# Patient Record
Sex: FEMALE | Race: BLACK OR AFRICAN AMERICAN | NOT HISPANIC OR LATINO | Employment: FULL TIME | URBAN - METROPOLITAN AREA
[De-identification: names, ages, dates, MRNs, and addresses within clinical notes are randomized per-mention and may not be internally consistent; named-entity substitution may affect disease eponyms.]

---

## 2017-03-02 ENCOUNTER — OFFICE VISIT (OUTPATIENT)
Dept: FAMILY MEDICINE | Facility: CLINIC | Age: 27
End: 2017-03-02
Payer: COMMERCIAL

## 2017-03-02 VITALS
RESPIRATION RATE: 18 BRPM | HEART RATE: 93 BPM | BODY MASS INDEX: 53.84 KG/M2 | WEIGHT: 274.25 LBS | SYSTOLIC BLOOD PRESSURE: 130 MMHG | DIASTOLIC BLOOD PRESSURE: 78 MMHG | OXYGEN SATURATION: 97 % | HEIGHT: 60 IN | TEMPERATURE: 98 F

## 2017-03-02 DIAGNOSIS — J06.9 ACUTE URI: Primary | ICD-10-CM

## 2017-03-02 PROCEDURE — 1160F RVW MEDS BY RX/DR IN RCRD: CPT | Mod: S$GLB,,, | Performed by: REGISTERED NURSE

## 2017-03-02 PROCEDURE — 99999 PR PBB SHADOW E&M-EST. PATIENT-LVL III: CPT | Mod: PBBFAC,,, | Performed by: REGISTERED NURSE

## 2017-03-02 PROCEDURE — 99213 OFFICE O/P EST LOW 20 MIN: CPT | Mod: S$GLB,,, | Performed by: REGISTERED NURSE

## 2017-03-02 RX ORDER — BENZONATATE 100 MG/1
100-200 CAPSULE ORAL 3 TIMES DAILY PRN
Qty: 45 CAPSULE | Refills: 0 | Status: SHIPPED | OUTPATIENT
Start: 2017-03-02 | End: 2017-03-31

## 2017-03-02 RX ORDER — METHYLPREDNISOLONE 4 MG/1
TABLET ORAL
Qty: 1 PACKAGE | Refills: 0 | Status: SHIPPED | OUTPATIENT
Start: 2017-03-02 | End: 2017-03-31

## 2017-03-02 NOTE — LETTER
March 2, 2017      [facility]  [address] [address]  Phone: [phone]       Patient: Rebecca Blankenship   YOB: 1990  Date of Visit: 03/02/2017    To Whom It May Concern:    Rebecca was at [facility] on 03/02/2017. She may return to work on 03/06/2017 with no restrictions. If you have any questions or concerns, or if I can be of further assistance, please do not hesitate to contact me.    Sincerely,    Roshan Bartlett. NP

## 2017-03-02 NOTE — MR AVS SNAPSHOT
[facility]  [address] [place] [address]  Phone: [phone]                  Rebecca Blankenship   3/2/2017 2:45 PM   Office Visit    Description:  Female : 1990   Provider:  Roshan Bartlett NP   Department:  [facility]           Reason for Visit     Sinus Problem           Diagnoses this Visit        Comments    Acute URI    -  Primary            To Do List           Future Appointments        Provider Department Dept Phone    3/20/2017 4:00 PM Gaby Bishop MD [facility] [phone]      Goals (5 Years of Data)     None       These Medications        Disp Refills Start End    methylPREDNISolone (MEDROL DOSEPACK) 4 mg tablet 1 Package 0 3/2/2017     use as directed    Pharmacy: [facility] [address] [place] [address] Ph #: [phone]       benzonatate (TESSALON) 100 MG capsule 45 capsule 0 3/2/2017     Take 1-2 capsules (100-200 mg total) by mouth 3 (three) times daily as needed for Cough. - Oral    Pharmacy: [facility] [address] [place] [address] Ph #: [phone]         [facility]     [facility] On Call Nurse Care Line -  Assistance  Registered nurses in the [facility] On Call Center provide clinical advisement, health education, appointment booking, and other advisory services.  Call for this free service at [phone].             Medications           Message regarding Medications     Verify the changes and/or additions to your medication regime listed below are the same as discussed with your clinician today.  If any of these changes or additions are incorrect, please notify your healthcare provider.        START taking these NEW medications        Refills    methylPREDNISolone (MEDROL DOSEPACK) 4 mg tablet 0    Sig: use as directed    Class: Normal    benzonatate (TESSALON) 100 MG capsule 0    Sig: Take 1-2 capsules (100-200 mg total) by  mouth 3 (three) times daily as needed for Cough.    Class: Normal    Route: Oral           Verify that the below list of medications is an accurate representation of the medications you are currently taking.  If none reported, the list may be blank. If incorrect, please contact your healthcare provider. Carry this list with you in case of emergency.           Current Medications     ibuprofen (ADVIL,MOTRIN) 800 MG tablet Take 1 tablet (800 mg total) by mouth 3 (three) times daily as needed for Pain (with food).    metformin (GLUCOPHAGE-XR) 500 MG 24 hr tablet Take 2 tablets (1,000 mg total) by mouth 2 (two) times daily with meals.    methocarbamol (ROBAXIN) 750 MG Tab Take 1 tablet (750 mg total) by mouth 2 (two) times daily as needed.    spironolactone (ALDACTONE) 100 MG tablet Take 1 tablet (100 mg total) by mouth once daily.    benzonatate (TESSALON) 100 MG capsule Take 1-2 capsules (100-200 mg total) by mouth 3 (three) times daily as needed for Cough.    cimetidine (TAGAMET) 800 MG tablet Take 1 tablet (800 mg total) by mouth every evening.    methylPREDNISolone (MEDROL DOSEPACK) 4 mg tablet use as directed    rabeprazole (ACIPHEX) 20 mg tablet Take 1 tablet (20 mg total) by mouth once daily.           Clinical Reference Information           Your Vitals Were     BP                   130/78 (BP Location: Left arm, Patient Position: Sitting, BP Method: Manual)           Blood Pressure          Most Recent Value    BP  130/78      Allergies as of 3/2/2017     No Known Allergies      Immunizations Administered on Date of Encounter - 3/2/2017     None      Language Assistance Services     ATTENTION: Language assistance services are available, free of charge. Please call [phone].      ATENCIÓN: Si bryant barbour, tiene a lopez disposición servicios gratuitos de asistencia lingüística. Llame al [phone].     CHÚ Ý: N?u b?n nói Ti?ng Vi?t, có các d?ch v? h? tr? ngôn ng? mi?n phí dành cho b?n. G?i s?  [phone].         [facility] complies with applicable Federal civil rights laws and does not discriminate on the basis of race, color, national origin, age, disability, or sex.

## 2017-03-06 NOTE — PROGRESS NOTES
"Subjective:       Patient ID: Rebecca Blankenship is a 26 y.o. female.    Chief Complaint: Sinus Problem    HPI     Rebecca is here today with c/o sinus issues x few days.  Reports sore throat, cough, RN, NC and chills.  She has taken OTC meds which does help control symptoms some.    Review of Systems   Constitutional: Positive for chills. Negative for diaphoresis.   HENT: Positive for congestion, rhinorrhea and sore throat. Negative for ear pain, sinus pressure and sneezing.    Eyes: Negative.    Respiratory: Positive for cough. Negative for shortness of breath and wheezing.    Cardiovascular: Negative.    Neurological: Negative.    Hematological: Negative for adenopathy.       Objective:         Vitals:    03/02/17 1503   BP: 130/78   BP Location: Left arm   Patient Position: Sitting   BP Method: Manual   Pulse: 93   Resp: 18   Temp: 98.1 °F (36.7 °C)   TempSrc: Tympanic   SpO2: 97%   Weight: 124.4 kg (274 lb 4 oz)   Height: 4' 11.5" (1.511 m)       Physical Exam   Constitutional: She is oriented to person, place, and time. She appears well-developed and well-nourished.   HENT:   Head: Normocephalic and atraumatic.   Right Ear: Tympanic membrane, external ear and ear canal normal.   Left Ear: Tympanic membrane, external ear and ear canal normal.   Nose: Mucosal edema present. No rhinorrhea. Right sinus exhibits no maxillary sinus tenderness and no frontal sinus tenderness. Left sinus exhibits no maxillary sinus tenderness and no frontal sinus tenderness.   Mouth/Throat: Oropharynx is clear and moist.   Eyes: Pupils are equal, round, and reactive to light. Right eye exhibits no discharge. Left eye exhibits no discharge. No scleral icterus.   Cardiovascular: Normal rate, regular rhythm and normal heart sounds.    Pulmonary/Chest: Effort normal and breath sounds normal.   Lymphadenopathy:     She has no cervical adenopathy.   Neurological: She is alert and oriented to person, place, and time.       Assessment: "       1. Acute URI        Plan:       Rebecca was seen today for sinus problem.    Diagnoses and all orders for this visit:    Acute URI  -     methylPREDNISolone (MEDROL DOSEPACK) 4 mg tablet; use as directed  -     benzonatate (TESSALON) 100 MG capsule; Take 1-2 capsules (100-200 mg total) by mouth 3 (three) times daily as needed for Cough.      Symptomatic care, rest, fluids, hydration.  Follow-up in clinic as needed.

## 2017-03-31 ENCOUNTER — OFFICE VISIT (OUTPATIENT)
Dept: FAMILY MEDICINE | Facility: CLINIC | Age: 27
End: 2017-03-31
Payer: COMMERCIAL

## 2017-03-31 VITALS
RESPIRATION RATE: 18 BRPM | SYSTOLIC BLOOD PRESSURE: 130 MMHG | HEART RATE: 88 BPM | BODY MASS INDEX: 53.24 KG/M2 | TEMPERATURE: 98 F | WEIGHT: 271.19 LBS | DIASTOLIC BLOOD PRESSURE: 82 MMHG | HEIGHT: 60 IN

## 2017-03-31 DIAGNOSIS — D50.9 IRON DEFICIENCY ANEMIA, UNSPECIFIED IRON DEFICIENCY ANEMIA TYPE: ICD-10-CM

## 2017-03-31 DIAGNOSIS — Z00.00 PREVENTATIVE HEALTH CARE: Primary | ICD-10-CM

## 2017-03-31 DIAGNOSIS — L68.0 ANDROGEN-DEPENDENT HIRSUTISM: ICD-10-CM

## 2017-03-31 DIAGNOSIS — E88.810 INSULIN RESISTANCE SYNDROME: ICD-10-CM

## 2017-03-31 DIAGNOSIS — K21.9 GASTROESOPHAGEAL REFLUX DISEASE WITHOUT ESOPHAGITIS: ICD-10-CM

## 2017-03-31 DIAGNOSIS — E66.01 MORBID OBESITY WITH BMI OF 50.0-59.9, ADULT: ICD-10-CM

## 2017-03-31 PROCEDURE — 99395 PREV VISIT EST AGE 18-39: CPT | Mod: S$GLB,,, | Performed by: FAMILY MEDICINE

## 2017-03-31 PROCEDURE — 99999 PR PBB SHADOW E&M-EST. PATIENT-LVL III: CPT | Mod: PBBFAC,,, | Performed by: FAMILY MEDICINE

## 2017-03-31 RX ORDER — SPIRONOLACTONE 100 MG/1
100 TABLET, FILM COATED ORAL 2 TIMES DAILY
Qty: 60 TABLET | Refills: 11 | Status: SHIPPED | OUTPATIENT
Start: 2017-03-31 | End: 2018-07-17 | Stop reason: SDUPTHER

## 2017-03-31 NOTE — MR AVS SNAPSHOT
[facility]  [address]  [place] [address]  Phone: [phone]                  Rebecca Blankenship   3/31/2017 4:00 PM   Office Visit    Description:  Female : 1990   Provider:  Gaby Bishop MD   Department:  [facility]           Reason for Visit     Annual Exam           Diagnoses this Visit        Comments    Preventative health care    -  Primary     Gastroesophageal reflux disease without esophagitis         Androgen-dependent hirsutism         Insulin resistance syndrome         Iron deficiency anemia, unspecified iron deficiency anemia type         Morbid obesity with BMI of 50.0-59.9, adult                To Do List           Future Appointments        Provider Department Dept Phone    4/3/2017 8:40 AM LABORATORY, [address] [facility] [phone]    2017 4:45 PM Samaria Dye MD [facility]/ -[phone]      Goals (5 Years of Data)     None       These Medications        Disp Refills Start End    spironolactone (ALDACTONE) 100 MG tablet 60 tablet 11 3/31/2017 3/31/2018    Take 1 tablet (100 mg total) by mouth 2 (two) times daily. - Oral    Pharmacy: [facility] [address] [place] [address] Ph #: [phone]         [facility]     [facility] Nurse Care Line -  Assistance  Unless otherwise directed by your provider, please contact [facility], our nurse care line that is available for  assistance.     Registered nurses in the [facility] Center provide: appointment scheduling, clinical advisement, health education, and other advisory services.  Call: [phone] (toll free)               Medications           Message regarding Medications     Verify the changes and/or additions to your medication regime listed below are the same as discussed with your clinician today.  If any of these changes or additions are incorrect, please notify  "your healthcare provider.        CHANGE how you are taking these medications     Start Taking Instead of    spironolactone (ALDACTONE) 100 MG tablet spironolactone (ALDACTONE) 100 MG tablet    Dosage:  Take 1 tablet (100 mg total) by mouth 2 (two) times daily. Dosage:  Take 1 tablet (100 mg total) by mouth once daily.    Reason for Change:  Reorder       STOP taking these medications     methylPREDNISolone (MEDROL DOSEPACK) 4 mg tablet use as directed    benzonatate (TESSALON) 100 MG capsule Take 1-2 capsules (100-200 mg total) by mouth 3 (three) times daily as needed for Cough.           Verify that the below list of medications is an accurate representation of the medications you are currently taking.  If none reported, the list may be blank. If incorrect, please contact your healthcare provider. Carry this list with you in case of emergency.           Current Medications     ibuprofen (ADVIL,MOTRIN) 800 MG tablet Take 1 tablet (800 mg total) by mouth 3 (three) times daily as needed for Pain (with food).    metformin (GLUCOPHAGE-XR) 500 MG 24 hr tablet Take 2 tablets (1,000 mg total) by mouth 2 (two) times daily with meals.    methocarbamol (ROBAXIN) 750 MG Tab Take 1 tablet (750 mg total) by mouth 2 (two) times daily as needed.    spironolactone (ALDACTONE) 100 MG tablet Take 1 tablet (100 mg total) by mouth 2 (two) times daily.    cimetidine (TAGAMET) 800 MG tablet Take 1 tablet (800 mg total) by mouth every evening.    rabeprazole (ACIPHEX) 20 mg tablet Take 1 tablet (20 mg total) by mouth once daily.           Clinical Reference Information           Your Vitals Were     BP Pulse Temp Resp Height Weight    130/82 88 98.1 °F (36.7 °C) (Tympanic) 18 4' 11.5" (1.511 m) 123 kg (271 lb 2.7 oz)    Last Period BMI             01/13/2017 53.85 kg/m2         Blood Pressure          Most Recent Value    BP  130/82      Allergies as of 3/31/2017     No Known Allergies      Immunizations Administered on Date of Encounter " - 3/31/2017     None      Orders Placed During Today's Visit     Future Labs/Procedures Expected by Expires    Basic metabolic panel  3/31/2017 5/30/2018    CBC auto differential  3/31/2017 5/30/2018    Hemoglobin A1c  3/31/2017 5/30/2018    Insulin, random  3/31/2017 5/30/2018      Language Assistance Services     ATTENTION: Language assistance services are available, free of charge. Please call [phone].      ATENCIÓN: Si habla español, tiene a lopez disposición servicios gratuitos de asistencia lingüística. Llame al [phone].     CHÚ Ý: N?u b?n nói Ti?ng Vi?t, có các d?ch v? h? tr? ngôn ng? mi?n phí dành cho b?n. G?i s? [phone].         [facility] complies with applicable Federal civil rights laws and does not discriminate on the basis of race, color, national origin, age, disability, or sex.

## 2017-04-04 ENCOUNTER — LAB VISIT (OUTPATIENT)
Dept: LAB | Facility: HOSPITAL | Age: 27
End: 2017-04-04
Attending: FAMILY MEDICINE
Payer: COMMERCIAL

## 2017-04-04 DIAGNOSIS — Z00.00 PREVENTATIVE HEALTH CARE: ICD-10-CM

## 2017-04-04 DIAGNOSIS — E88.810 INSULIN RESISTANCE SYNDROME: ICD-10-CM

## 2017-04-04 LAB
ANION GAP SERPL CALC-SCNC: 8 MMOL/L
BASOPHILS # BLD AUTO: 0.01 K/UL
BASOPHILS NFR BLD: 0.1 %
BUN SERPL-MCNC: 11 MG/DL
CALCIUM SERPL-MCNC: 9.4 MG/DL
CHLORIDE SERPL-SCNC: 105 MMOL/L
CO2 SERPL-SCNC: 26 MMOL/L
CREAT SERPL-MCNC: 0.7 MG/DL
DIFFERENTIAL METHOD: ABNORMAL
EOSINOPHIL # BLD AUTO: 0.1 K/UL
EOSINOPHIL NFR BLD: 0.7 %
ERYTHROCYTE [DISTWIDTH] IN BLOOD BY AUTOMATED COUNT: 21.2 %
EST. GFR  (AFRICAN AMERICAN): >60 ML/MIN/1.73 M^2
EST. GFR  (NON AFRICAN AMERICAN): >60 ML/MIN/1.73 M^2
GLUCOSE SERPL-MCNC: 76 MG/DL
HCT VFR BLD AUTO: 35.6 %
HGB BLD-MCNC: 11.6 G/DL
INSULIN COLLECTION INTERVAL: NORMAL
INSULIN SERPL-ACNC: 23.7 UU/ML
LYMPHOCYTES # BLD AUTO: 2.7 K/UL
LYMPHOCYTES NFR BLD: 39.8 %
MCH RBC QN AUTO: 24.6 PG
MCHC RBC AUTO-ENTMCNC: 32.6 %
MCV RBC AUTO: 76 FL
MONOCYTES # BLD AUTO: 0.5 K/UL
MONOCYTES NFR BLD: 7 %
NEUTROPHILS # BLD AUTO: 3.6 K/UL
NEUTROPHILS NFR BLD: 52.1 %
PLATELET # BLD AUTO: 395 K/UL
PMV BLD AUTO: 9.6 FL
POTASSIUM SERPL-SCNC: 4.4 MMOL/L
RBC # BLD AUTO: 4.71 M/UL
SODIUM SERPL-SCNC: 139 MMOL/L
WBC # BLD AUTO: 6.89 K/UL

## 2017-04-04 PROCEDURE — 83525 ASSAY OF INSULIN: CPT

## 2017-04-04 PROCEDURE — 85025 COMPLETE CBC W/AUTO DIFF WBC: CPT

## 2017-04-04 PROCEDURE — 36415 COLL VENOUS BLD VENIPUNCTURE: CPT | Mod: PO

## 2017-04-04 PROCEDURE — 83036 HEMOGLOBIN GLYCOSYLATED A1C: CPT

## 2017-04-04 PROCEDURE — 80048 BASIC METABOLIC PNL TOTAL CA: CPT

## 2017-04-05 LAB
ESTIMATED AVG GLUCOSE: 123 MG/DL
HBA1C MFR BLD HPLC: 5.9 %

## 2017-04-24 ENCOUNTER — PATIENT MESSAGE (OUTPATIENT)
Dept: FAMILY MEDICINE | Facility: CLINIC | Age: 27
End: 2017-04-24

## 2017-04-26 RX ORDER — RABEPRAZOLE SODIUM 20 MG/1
20 TABLET, DELAYED RELEASE ORAL DAILY
Qty: 30 TABLET | Refills: 0 | Status: SHIPPED | OUTPATIENT
Start: 2017-04-26 | End: 2017-12-22

## 2017-06-28 RX ORDER — RABEPRAZOLE SODIUM 20 MG/1
TABLET, DELAYED RELEASE ORAL
Qty: 30 TABLET | Refills: 0 | Status: SHIPPED | OUTPATIENT
Start: 2017-06-28 | End: 2017-09-05 | Stop reason: SDUPTHER

## 2017-09-05 RX ORDER — METHOCARBAMOL 750 MG/1
750 TABLET, FILM COATED ORAL 2 TIMES DAILY PRN
Qty: 60 TABLET | Refills: 5 | Status: SHIPPED | OUTPATIENT
Start: 2017-09-05 | End: 2020-04-21 | Stop reason: SDUPTHER

## 2017-09-05 RX ORDER — RABEPRAZOLE SODIUM 20 MG/1
20 TABLET, DELAYED RELEASE ORAL DAILY
Qty: 30 TABLET | Refills: 5 | Status: SHIPPED | OUTPATIENT
Start: 2017-09-05 | End: 2017-12-22 | Stop reason: SDUPTHER

## 2017-09-05 RX ORDER — RABEPRAZOLE SODIUM 20 MG/1
TABLET, DELAYED RELEASE ORAL
Qty: 30 TABLET | Refills: 0 | OUTPATIENT
Start: 2017-09-05

## 2017-12-22 ENCOUNTER — OFFICE VISIT (OUTPATIENT)
Dept: FAMILY MEDICINE | Facility: CLINIC | Age: 27
End: 2017-12-22
Payer: COMMERCIAL

## 2017-12-22 VITALS
WEIGHT: 280.88 LBS | BODY MASS INDEX: 55.14 KG/M2 | TEMPERATURE: 98 F | HEART RATE: 88 BPM | HEIGHT: 60 IN | OXYGEN SATURATION: 99 % | SYSTOLIC BLOOD PRESSURE: 130 MMHG | DIASTOLIC BLOOD PRESSURE: 80 MMHG | RESPIRATION RATE: 17 BRPM

## 2017-12-22 DIAGNOSIS — B34.9 ACUTE VIRAL SYNDROME: Primary | ICD-10-CM

## 2017-12-22 PROCEDURE — 99213 OFFICE O/P EST LOW 20 MIN: CPT | Mod: S$GLB,,, | Performed by: REGISTERED NURSE

## 2017-12-22 PROCEDURE — 99999 PR PBB SHADOW E&M-EST. PATIENT-LVL III: CPT | Mod: PBBFAC,,, | Performed by: REGISTERED NURSE

## 2017-12-22 RX ORDER — PREDNISONE 20 MG/1
TABLET ORAL
Qty: 10 TABLET | Refills: 0 | Status: SHIPPED | OUTPATIENT
Start: 2017-12-22 | End: 2018-07-16

## 2017-12-22 RX ORDER — PROMETHAZINE HYDROCHLORIDE AND DEXTROMETHORPHAN HYDROBROMIDE 6.25; 15 MG/5ML; MG/5ML
5 SYRUP ORAL
Qty: 180 ML | Refills: 0 | Status: SHIPPED | OUTPATIENT
Start: 2017-12-22 | End: 2018-10-16

## 2018-01-09 DIAGNOSIS — B34.9 ACUTE VIRAL SYNDROME: ICD-10-CM

## 2018-01-09 RX ORDER — PREDNISONE 20 MG/1
TABLET ORAL
Qty: 10 TABLET | Refills: 0 | OUTPATIENT
Start: 2018-01-09

## 2018-07-16 ENCOUNTER — PATIENT MESSAGE (OUTPATIENT)
Dept: OBSTETRICS AND GYNECOLOGY | Facility: CLINIC | Age: 28
End: 2018-07-16

## 2018-07-16 ENCOUNTER — PATIENT MESSAGE (OUTPATIENT)
Dept: FAMILY MEDICINE | Facility: CLINIC | Age: 28
End: 2018-07-16

## 2018-07-16 ENCOUNTER — LAB VISIT (OUTPATIENT)
Dept: LAB | Facility: HOSPITAL | Age: 28
End: 2018-07-16
Attending: REGISTERED NURSE
Payer: COMMERCIAL

## 2018-07-16 ENCOUNTER — OFFICE VISIT (OUTPATIENT)
Dept: FAMILY MEDICINE | Facility: CLINIC | Age: 28
End: 2018-07-16
Payer: COMMERCIAL

## 2018-07-16 ENCOUNTER — OFFICE VISIT (OUTPATIENT)
Dept: OBSTETRICS AND GYNECOLOGY | Facility: CLINIC | Age: 28
End: 2018-07-16
Payer: COMMERCIAL

## 2018-07-16 VITALS
HEIGHT: 59 IN | WEIGHT: 279.88 LBS | DIASTOLIC BLOOD PRESSURE: 84 MMHG | SYSTOLIC BLOOD PRESSURE: 130 MMHG | BODY MASS INDEX: 56.42 KG/M2

## 2018-07-16 VITALS
DIASTOLIC BLOOD PRESSURE: 78 MMHG | OXYGEN SATURATION: 99 % | SYSTOLIC BLOOD PRESSURE: 124 MMHG | TEMPERATURE: 98 F | HEIGHT: 59 IN | WEIGHT: 278.88 LBS | HEART RATE: 87 BPM | BODY MASS INDEX: 56.22 KG/M2

## 2018-07-16 DIAGNOSIS — E88.810 INSULIN RESISTANCE SYNDROME: ICD-10-CM

## 2018-07-16 DIAGNOSIS — L68.0 ANDROGEN-DEPENDENT HIRSUTISM: ICD-10-CM

## 2018-07-16 DIAGNOSIS — Z00.00 ANNUAL PHYSICAL EXAM: ICD-10-CM

## 2018-07-16 DIAGNOSIS — K21.9 GASTROESOPHAGEAL REFLUX DISEASE WITHOUT ESOPHAGITIS: ICD-10-CM

## 2018-07-16 DIAGNOSIS — E66.01 MORBID OBESITY WITH BMI OF 50.0-59.9, ADULT: ICD-10-CM

## 2018-07-16 DIAGNOSIS — Z00.00 ANNUAL PHYSICAL EXAM: Primary | ICD-10-CM

## 2018-07-16 DIAGNOSIS — D50.9 IRON DEFICIENCY ANEMIA, UNSPECIFIED IRON DEFICIENCY ANEMIA TYPE: ICD-10-CM

## 2018-07-16 DIAGNOSIS — N92.1 MENORRHAGIA WITH IRREGULAR CYCLE: Primary | ICD-10-CM

## 2018-07-16 LAB
ALBUMIN SERPL BCP-MCNC: 3.6 G/DL
ALP SERPL-CCNC: 116 U/L
ALT SERPL W/O P-5'-P-CCNC: 34 U/L
ANION GAP SERPL CALC-SCNC: 9 MMOL/L
AST SERPL-CCNC: 34 U/L
BASOPHILS # BLD AUTO: 0.02 K/UL
BASOPHILS NFR BLD: 0.3 %
BILIRUB SERPL-MCNC: 0.9 MG/DL
BUN SERPL-MCNC: 10 MG/DL
CALCIUM SERPL-MCNC: 9.3 MG/DL
CHLORIDE SERPL-SCNC: 106 MMOL/L
CO2 SERPL-SCNC: 24 MMOL/L
CREAT SERPL-MCNC: 0.7 MG/DL
DIFFERENTIAL METHOD: ABNORMAL
EOSINOPHIL # BLD AUTO: 0.1 K/UL
EOSINOPHIL NFR BLD: 0.6 %
ERYTHROCYTE [DISTWIDTH] IN BLOOD BY AUTOMATED COUNT: 19.5 %
EST. GFR  (AFRICAN AMERICAN): >60 ML/MIN/1.73 M^2
EST. GFR  (NON AFRICAN AMERICAN): >60 ML/MIN/1.73 M^2
GLUCOSE SERPL-MCNC: 86 MG/DL
HCT VFR BLD AUTO: 33 %
HGB BLD-MCNC: 9.4 G/DL
IMM GRANULOCYTES # BLD AUTO: 0.04 K/UL
IMM GRANULOCYTES NFR BLD AUTO: 0.5 %
LYMPHOCYTES # BLD AUTO: 2.2 K/UL
LYMPHOCYTES NFR BLD: 28.8 %
MCH RBC QN AUTO: 22 PG
MCHC RBC AUTO-ENTMCNC: 28.5 G/DL
MCV RBC AUTO: 77 FL
MONOCYTES # BLD AUTO: 0.6 K/UL
MONOCYTES NFR BLD: 7.3 %
NEUTROPHILS # BLD AUTO: 4.8 K/UL
NEUTROPHILS NFR BLD: 62.5 %
NRBC BLD-RTO: 0 /100 WBC
PLATELET # BLD AUTO: 405 K/UL
PMV BLD AUTO: 9.2 FL
POTASSIUM SERPL-SCNC: 4.2 MMOL/L
PROT SERPL-MCNC: 7.7 G/DL
RBC # BLD AUTO: 4.27 M/UL
SODIUM SERPL-SCNC: 139 MMOL/L
TESTOST SERPL-MCNC: 43 NG/DL
TSH SERPL DL<=0.005 MIU/L-ACNC: 2.73 UIU/ML
WBC # BLD AUTO: 7.71 K/UL

## 2018-07-16 PROCEDURE — 36415 COLL VENOUS BLD VENIPUNCTURE: CPT | Mod: PO

## 2018-07-16 PROCEDURE — 81025 URINE PREGNANCY TEST: CPT | Mod: S$GLB,,, | Performed by: NURSE PRACTITIONER

## 2018-07-16 PROCEDURE — 80053 COMPREHEN METABOLIC PANEL: CPT

## 2018-07-16 PROCEDURE — 85025 COMPLETE CBC W/AUTO DIFF WBC: CPT

## 2018-07-16 PROCEDURE — 83036 HEMOGLOBIN GLYCOSYLATED A1C: CPT

## 2018-07-16 PROCEDURE — 84443 ASSAY THYROID STIM HORMONE: CPT

## 2018-07-16 PROCEDURE — 83525 ASSAY OF INSULIN: CPT

## 2018-07-16 PROCEDURE — 3008F BODY MASS INDEX DOCD: CPT | Mod: CPTII,S$GLB,, | Performed by: NURSE PRACTITIONER

## 2018-07-16 PROCEDURE — 84403 ASSAY OF TOTAL TESTOSTERONE: CPT

## 2018-07-16 PROCEDURE — 99999 PR PBB SHADOW E&M-EST. PATIENT-LVL III: CPT | Mod: PBBFAC,,, | Performed by: NURSE PRACTITIONER

## 2018-07-16 PROCEDURE — 99999 PR PBB SHADOW E&M-EST. PATIENT-LVL III: CPT | Mod: PBBFAC,,, | Performed by: REGISTERED NURSE

## 2018-07-16 PROCEDURE — 99395 PREV VISIT EST AGE 18-39: CPT | Mod: S$GLB,,, | Performed by: REGISTERED NURSE

## 2018-07-16 PROCEDURE — 99204 OFFICE O/P NEW MOD 45 MIN: CPT | Mod: 25,S$GLB,, | Performed by: NURSE PRACTITIONER

## 2018-07-16 RX ORDER — SPIRONOLACTONE 100 MG/1
100 TABLET, FILM COATED ORAL 2 TIMES DAILY
Qty: 60 TABLET | Refills: 11 | Status: CANCELLED | OUTPATIENT
Start: 2018-07-16 | End: 2019-07-16

## 2018-07-16 RX ORDER — RABEPRAZOLE SODIUM 20 MG/1
20 TABLET, DELAYED RELEASE ORAL DAILY
Qty: 30 TABLET | Refills: 5 | Status: CANCELLED | OUTPATIENT
Start: 2018-07-16

## 2018-07-16 RX ORDER — LEVONORGESTREL AND ETHINYL ESTRADIOL 0.15-0.03
1 KIT ORAL DAILY
Qty: 30 TABLET | Refills: 11 | Status: SHIPPED | OUTPATIENT
Start: 2018-07-16 | End: 2019-05-30 | Stop reason: SDUPTHER

## 2018-07-16 RX ORDER — METFORMIN HYDROCHLORIDE 500 MG/1
1000 TABLET, EXTENDED RELEASE ORAL 2 TIMES DAILY WITH MEALS
Qty: 120 TABLET | Refills: 6 | Status: CANCELLED | OUTPATIENT
Start: 2018-07-16 | End: 2019-07-16

## 2018-07-16 NOTE — LETTER
July 16, 2018      [facility]  [address] [facility] [address]  Phone: [phone]       Patient: Rebecca Blankenship   YOB: 1990  Date of Visit: 07/16/2018    To Whom It May Concern:    Sherrie Blankenship  was at [facility] on 07/16/2018. She may return to work on 07/17/2018 with no restrictions. If you have any questions or concerns, or if I can be of further assistance, please do not hesitate to contact me.    Sincerely,    Nia Mora MA

## 2018-07-16 NOTE — PATIENT INSTRUCTIONS
Heavy Menstrual Bleeding    Heavy menstrual bleeding means that your periods are heavier or longer than usual. You may soak through a pad or tampon every 1 to 3 hours on the heaviest days of your period. You may also pass large, dark clots. And your periods may last longer than 7 days.  If you have heavy periods often, this can cause a problem called anemia. With anemia, your red blood cell count is too low. Red blood cells are needed because they help carry oxygen throughout your body. Severe anemia may cause you to look pale and feel weak or tired. You might also become short of breath easily.  There are many possible causes of heavy menstrual bleeding. Hormonal imbalance is the most common cause. Having benign growths in your uterus, such as fibroids or polyps, is another cause. Taking certain medicines or having certain health problems or bleeding disorders are also causes.  To treat heavy menstrual bleeding, medicines are often tried first. If these dont help, further testing and treatments will likely be needed.  Home care  Medicines  If youre prescribed medicines, be sure to take them as directed.  · To help control heavy bleeding, any of the following may be used:  ¨ Hormone therapy (this includes all methods of hormonal birth control such as pills, shots, cream, ring, patch, or hormone-releasing IUD)  ¨ Nonsteroidal anti-inflammatory drugs (NSAIDs), such as ibuprofen  ¨ Antifibrinolytic medicines, such as transexamic acid  · To help treat anemia, iron supplements may be prescribed.            General care  · Get plenty of rest if you tire easily. Avoid heavy exertion.  · To help relieve pain or cramping, try using a heating pad on the lower belly or back. A warm bath may also help.  Follow-up care  Follow up with your healthcare provider as directed.  When to seek medical advice  Call your healthcare provider right away if any of these occur:  · Heavier bleeding (soaking 1 pad or tampon every hour for 3  hours)  · Heavy bleeding that lasts longer than 1 week  · Fever of 100.4ºF (38ºC) or higher, or as directed by your provider  · Pain or cramping that gets worse instead of better  · Signs of anemia such as pale skin, extreme fatigue or weakness, or shortness of breath  · Dizziness or fainting  Date Last Reviewed: 6/11/2015  © 8436-8942 [employer]. [address] [place] [address], [place] [address]. All rights reserved. This information is not intended as a substitute for professional medical care. Always follow your healthcare professional's instructions.        Dysfunctional Uterine Bleeding    Dysfunctional uterine bleeding is a condition in which bleeding is abnormal and occurs at unexpected times of the month. This happens because of changes in the hormones that help control a womans menstrual cycle each month.  The bleeding may be heavier or lighter than normal. If you have heavy bleeding often, this can lead to a problem called anemia. With anemia, your red blood cell count is too low. Red blood cells are needed because they help carry oxygen throughout your body. Severe anemia may cause you to look pale and feel very weak or tired. You might also become short of breath easily.  To treat dysfunctional uterine bleeding, medicines are often tried first. If these dont help, further testing and treatments may be needed. Discuss all of your options with your provider.  Home care  Medicines  If youre prescribed medicines, be sure to take them as directed. Some of the more common medicines you may be prescribed include:  · Hormone therapy (Options include most methods of hormonal birth control such as pills, shots, or a hormone-releasing IUD)  · Nonsteroidal anti-inflammatory drugs (NSAIDs), such as ibuprofen  · Iron supplements, if you have anemia     General care  · Get plenty of rest if you tire easily. Avoid heavy exertion.  · To help relieve pain or cramping that may occur with bleeding, try using a  heating pad on the lower belly or back. A warm bath may also help.  Follow-up care  Follow up with your healthcare provider as directed.  When to seek medical advice  Call your healthcare provider right away if:  · Bleeding becomes heavy (soaking 1 pad or tampon every hour for 3 hours)  · Increased abdominal pain  · Irregular bleeding worsens or does not get better even with treatment  · Fever of 100.4ºF (38ºC) or higher, or as directed by your provider  · Signs of anemia, such as pale skin, extreme fatigue or weakness, or shortness of breath  · Dizziness or fainting   Date Last Reviewed: 6/11/2015  © 6952-2455 [employer]. [address] [place] [address], [place] [address]. All rights reserved. This information is not intended as a substitute for professional medical care. Always follow your healthcare professional's instructions.        Understanding Periods  Having a period is a normal, healthy part of becoming and being a woman. A period is the result of a cycle that takes place inside a girls body. This menstrual cycle makes it possible for women to have babies. The cycle begins with the first day of bleeding. In the middle of the cycle, ovulation occurs. This is when an egg is released and begins its journey from the ovary to the uterus.    An egg is released  · During each cycle, 1 egg grows and is released from an ovary. It finds its way to the fallopian tube.  The egg travels through a tube  · The egg moves through the fallopian tube toward the uterus. (If the egg and a mans sperm meet here, a woman becomes pregnant.)  The lining thickens  · The lining of the uterus grows thicker. This lining is made up of blood, tissue, and fluid. (The lining will nourish a growing baby during pregnancy.)  The egg and lining are shed  · About once a month, the egg and the lining of the uterus are shed through the vagina. This is called a period. (A period does not happen during pregnancy.)  Date Last Reviewed:  5/9/2015 © 2000-2017 [employer]. [address] [place] [address], [place] [address]. All rights reserved. This information is not intended as a substitute for professional medical care. Always follow your healthcare professional's instructions.        Understanding Uterine Bleeding  Your uterine bleeding may be heavy. Or you may have bleeding between periods. These problems may be caused by hormonal imbalance. Or they can be caused by uterine growths, an intrauterine device (IUD), bleeding disorder, or pregnancy.  Hormonal imbalance  Your menstrual cycle is controlled by hormones. The hormones include estrogen and progesterone. Sometimes there is too much or too little of 1 or both of these hormones. This can cause heavy periods. Or it can cause bleeding between periods. Causes of hormonal imbalance can include:  · Hormonal changes in teens and in women nearing menopause  · Diabetes, thyroid disease, or other medical problems  · Obesity  · Stress  · Strenuous exercise  · Anorexia (an eating disorder)  · Pregnancy  Uterine growths  There are different kinds of uterine growths. These include:  · Fibroids. These are round knots of muscle tissue in the uterus.  · Polyps. These are soft tissue growths in the uterine lining. They often hang into the uterus.  · Adenomyosis. This is when the uterine lining grows into the muscle wall.  · Hyperplasia. This is when the uterine lining gets too thick or grows too much.  · Endometrial cancer. This is uncontrolled growth of part of the uterine lining.  Other causes of uterine bleeding  There are other causes of uterine bleeding. These include:  · IUD (intrauterine device). This is a method of birth control. Some IUDs contain hormones.  · Bleeding disorders. This is when the blood can't clot properly.  Treatment  Your health care provider can help diagnose the cause of your bleeding problem. He or she will work then work with you to plan treatment as needed.  Date Last  Reviewed: 7/6/2015  © 2147-6609 [employer]. [address] [place] [address]. All rights reserved. This information is not intended as a substitute for professional medical care. Always follow your healthcare professional's instructions.        Polycystic Ovary Syndrome  Polycystic ovary syndrome (PCOS) causes harmless, small cysts in the ovaries and other symptoms. PCOS is caused by certain hormones being out of balance. The word syndrome means a group of symptoms. Women with PCOS may have no periods, irregular periods, or very long periods.    Your ovaries  Women store their eggs in their ovaries. Each egg is in a capsule called a follicle. Normally during the reproductive years, one follicle grows to produce a mature egg each month. This egg is released during ovulation and the follicle dissolves.  Hormones out of balance  With polycystic ovary syndrome (PCOS), the hormones that control ovulation are out of balance. These include estrogen, progesterones, and androgen. As a result, ovulation may not occur. Instead, the follicle stays enlarged. This is a fluid-filled sac (cyst). Over time, the ovaries fill with many small cysts. This is why they are called poly (many) cystic ovaries. In some women, the ovaries also make too much androgen.  PCOS symptoms  Women with PCOS may also have one or more of these symptoms:  · Trouble getting pregnant (fertility problems)  · Weight gain, especially around the waist   · Acne  · Hair growth on the face and other parts of the body  · Patches of thickened, velvety, darkened skin called acanthosis nigricans  Women with PCOS are also at increased risk of developing cancer of the uterine lining, diabetes, and heart disease.   Date Last Reviewed: 5/10/2015  © 7144-4437 [employer]. [address] [place] [address]. All rights reserved. This information is not intended as a substitute for professional medical care. Always follow  your healthcare professional's instructions.

## 2018-07-16 NOTE — PROGRESS NOTES
CC: Well woman exam    Rebecca Blankenship is a 27 y.o. female  presents for well woman exam but is currently bleeding heavy.  She has history of irregular bleeding.  Has been trying to regulate it with metformin and spironolactone which is not working. Last cycle was 2018 then bleeding started again on 18 and is lasting through today.  Pt is not sexually active and denies ever being so in past.  LMP: Patient's last menstrual period was 2018 (approximate)..       Past Medical History:   Diagnosis Date    Gastroesophageal reflux disease 2015    Insulin resistance     Iron deficiency anemia     Morbid obesity with BMI of 50.0-59.9, adult     Oligomenorrhea     Oligomenorrhea      Past Surgical History:   Procedure Laterality Date    FRACTURE OF WRIST Right      Social History     Social History    Marital status: Single     Spouse name: N/A    Number of children: N/A    Years of education: N/A     Occupational History    Not on file.     Social History Main Topics    Smoking status: Never Smoker    Smokeless tobacco: Never Used    Alcohol use 0.0 - 0.6 oz/week      Comment: occ     Drug use: No    Sexual activity: No     Other Topics Concern    Not on file     Social History Narrative    The patient is single and has no children.  She lives alone.  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     Family History   Problem Relation Age of Onset    Colon cancer Paternal Grandfather     Breast cancer Maternal Aunt     Stomach cancer Maternal Uncle     Stomach cancer Maternal Uncle     Heart failure Maternal Grandmother     Stroke Maternal Grandmother     Hypertension Maternal Grandmother     Kidney disease Maternal Grandmother     Heart attack Maternal Uncle     Aortic aneurysm Maternal Aunt     Coronary artery disease Maternal Aunt         S/P stent    Coronary artery disease Maternal Uncle         S/P stent    Stroke  "Maternal Uncle     Hypertension Maternal Aunt     Hypertension Maternal Uncle     Fibroids Mother      OB History      Para Term  AB Living    0 0 0 0 0 0    SAB TAB Ectopic Multiple Live Births    0 0 0 0 0          /84   Ht 4' 11" (1.499 m)   Wt 126.9 kg (279 lb 14 oz)   LMP 2018 (Approximate)   BMI 56.53 kg/m²       ROS:  GENERAL: Denies weight gain or weight loss. Feeling well overall.   SKIN: Denies rash or lesions.   HEAD: Denies head injury or headache.   NODES: Denies enlarged lymph nodes.   CHEST: Denies chest pain or shortness of breath.   CARDIOVASCULAR: Denies palpitations or left sided chest pain.   ABDOMEN: No abdominal pain, constipation, diarrhea, nausea, vomiting or rectal bleeding.   URINARY: No frequency, dysuria, hematuria, or burning on urination.  REPRODUCTIVE: See HPI.   BREASTS: The patient performs breast self-examination and denies pain, lumps, or nipple discharge.   HEMATOLOGIC: No easy bruisability or excessive bleeding.   MUSCULOSKELETAL: Denies joint pain or swelling.   NEUROLOGIC: Denies syncope or weakness.   PSYCHIATRIC: Denies depression, anxiety or mood swings.    PHYSICAL EXAM:  APPEARANCE: Well nourished, well developed, in no acute distress.  AFFECT: WNL, alert and oriented x 3  SKIN: No acne or hirsutism  NECK: Neck symmetric without masses or thyromegaly  NODES: No inguinal, cervical, axillary, or femoral lymph node enlargement  CHEST: Good respiratory effect  ABDOMEN: Soft.  No tenderness or masses.  No hepatosplenomegaly.  No hernias.  BREASTS: Symmetrical, no skin changes or visible lesions.  No palpable masses, nipple discharge bilaterally.  PELVIC: Normal external genitalia without lesions.  Normal hair distribution.  Adequate perineal body, normal urethral meatus.  Vagina moist and well rugated without lesions or discharge - heavy menstrual bleeding.  Cervix pink, without lesions, discharge or tenderness.  No significant cystocele or " rectocele.  Bimanual exam shows uterus to be normal size, regular, mobile and nontender.  Adnexa without masses or tenderness.    EXTREMITIES: No edema.  Physical Exam    1. Menorrhagia with irregular cycle  US Pelvis Comp with Transvag NON-OB (xpd)    POCT Urine Pregnancy    AND PLAN:    Patient was counseled today on A.C.S. Pap guidelines and recommendations for yearly pelvic exams, mammograms and monthly self breast exams; to see her PCP for other health maintenance.     Will not pap due to so much bleeding  upt negative  Starting ocp today and see back in 3 mths  Recheck pelvic us since has been 2015 since last one

## 2018-07-17 DIAGNOSIS — L68.0 ANDROGEN-DEPENDENT HIRSUTISM: ICD-10-CM

## 2018-07-17 DIAGNOSIS — E88.810 INSULIN RESISTANCE SYNDROME: ICD-10-CM

## 2018-07-17 LAB
ESTIMATED AVG GLUCOSE: 120 MG/DL
HBA1C MFR BLD HPLC: 5.8 %
INSULIN COLLECTION INTERVAL: ABNORMAL
INSULIN SERPL-ACNC: 25.7 UU/ML

## 2018-07-17 RX ORDER — METFORMIN HYDROCHLORIDE 500 MG/1
1000 TABLET, EXTENDED RELEASE ORAL 2 TIMES DAILY WITH MEALS
Qty: 120 TABLET | Refills: 11 | Status: SHIPPED | OUTPATIENT
Start: 2018-07-17 | End: 2019-01-10 | Stop reason: SDUPTHER

## 2018-07-17 RX ORDER — SPIRONOLACTONE 100 MG/1
100 TABLET, FILM COATED ORAL 2 TIMES DAILY
Qty: 60 TABLET | Refills: 11 | Status: SHIPPED | OUTPATIENT
Start: 2018-07-17 | End: 2019-07-26 | Stop reason: SDUPTHER

## 2018-07-18 ENCOUNTER — PATIENT MESSAGE (OUTPATIENT)
Dept: FAMILY MEDICINE | Facility: CLINIC | Age: 28
End: 2018-07-18

## 2018-07-18 ENCOUNTER — TELEPHONE (OUTPATIENT)
Dept: RADIOLOGY | Facility: HOSPITAL | Age: 28
End: 2018-07-18

## 2018-07-18 NOTE — TELEPHONE ENCOUNTER
Roshan,  You saw her for her physical.  I have not seen her in almost 1-1/2 years.  Please refill the medication she is requesting.  If you have a problem with refilling her medication, since you just saw her, please discuss this with her and not have the nurse attempt to get me to refill it.  Thanks.   Dr. Bishop

## 2018-07-18 NOTE — TELEPHONE ENCOUNTER
Could patient be referring to her Aciphex?  It is in her med history, but not RX'd since 9/2017, would you like to RX this med, please advise

## 2018-07-19 ENCOUNTER — HOSPITAL ENCOUNTER (OUTPATIENT)
Dept: RADIOLOGY | Facility: HOSPITAL | Age: 28
Discharge: HOME | End: 2018-07-19
Attending: NURSE PRACTITIONER
Payer: COMMERCIAL

## 2018-07-19 DIAGNOSIS — N92.1 MENORRHAGIA WITH IRREGULAR CYCLE: ICD-10-CM

## 2018-07-19 PROCEDURE — 76856 US EXAM PELVIC COMPLETE: CPT | Mod: 26,,, | Performed by: RADIOLOGY

## 2018-07-19 PROCEDURE — 76856 US EXAM PELVIC COMPLETE: CPT | Mod: TC,PO

## 2018-07-19 RX ORDER — RABEPRAZOLE SODIUM 20 MG/1
20 TABLET, DELAYED RELEASE ORAL DAILY
Qty: 30 TABLET | Refills: 2 | Status: SHIPPED | OUTPATIENT
Start: 2018-07-19 | End: 2019-01-10 | Stop reason: SDUPTHER

## 2018-07-19 NOTE — PROGRESS NOTES
Subjective:       Patient ID: Rebecca Blankenship is a 27 y.o. female.    Chief Complaint: Annual Exam      HPI    Rebecca is here today for her annual wellness exam.  I have reviewed the patient's medical history in detail and updated the computerized patient record.    Followed by [facility] --- last exam today with Osiris Martinez NP.        Review of Systems   Constitutional: Negative for activity change, appetite change, chills, diaphoresis, fatigue, fever and unexpected weight change.        Has added fish oil supplement to diet.  Cut back on sodas and fried foods.   HENT: Negative.    Eyes: Negative.    Respiratory: Negative.    Cardiovascular: Negative for chest pain, palpitations and leg swelling.   Gastrointestinal: Negative.    Endocrine: Negative for polydipsia, polyphagia and polyuria.   Genitourinary: Positive for menstrual problem.   Musculoskeletal: Negative.    Skin: Negative.    Neurological: Negative.    Hematological: Negative.    Psychiatric/Behavioral: Negative.          Patient Active Problem List   Diagnosis    Morbid obesity with BMI of 50.0-59.9, adult    Gastroesophageal reflux disease    Insulin resistance syndrome    Androgen-dependent hirsutism    Iron deficiency anemia       Past Medical History:   Diagnosis Date    Gastroesophageal reflux disease 11/20/2015    Insulin resistance     Iron deficiency anemia     Morbid obesity with BMI of 50.0-59.9, adult     Oligomenorrhea     Oligomenorrhea        Past Surgical History:  None.      Family History   Problem Relation Age of Onset    Colon cancer Paternal Grandfather     Breast cancer Maternal Aunt     Stomach cancer Maternal Uncle     Stomach cancer Maternal Uncle     Heart failure Maternal Grandmother     Stroke Maternal Grandmother     Hypertension Maternal Grandmother     Kidney disease Maternal Grandmother     Heart attack Maternal Uncle     Aortic aneurysm Maternal Aunt     Coronary artery disease Maternal  "Aunt         S/P stent    Coronary artery disease Maternal Uncle         S/P stent    Stroke Maternal Uncle     Hypertension Maternal Aunt     Hypertension Maternal Uncle     Fibroids Mother        Social History     Social History    Marital status: Single    Number of children: 0     Occupational History     [employer]     Social History Main Topics    Smoking status: Never Smoker    Smokeless tobacco: Never Used    Alcohol use Occasionally    Drug use: No    Sexual activity: No         Review of patient's allergies indicates:  No Known Allergies        Objective:     Vitals:    07/16/18 1026   BP: 124/78   BP Location: Left arm   Patient Position: Sitting   Pulse: 87   Temp: 97.5 °F (36.4 °C)   TempSrc: Tympanic   SpO2: 99%   Weight: 126.5 kg (278 lb 14.1 oz)   Height: 4' 11" (1.499 m)       Physical Exam   Constitutional: She is oriented to person, place, and time. She appears well-developed and well-nourished. No distress.   HENT:   Head: Normocephalic and atraumatic.   Right Ear: External ear normal.   Left Ear: External ear normal.   Nose: Nose normal.   Mouth/Throat: Oropharynx is clear and moist. No oropharyngeal exudate.   Eyes: Conjunctivae and EOM are normal. Pupils are equal, round, and reactive to light. Right eye exhibits no discharge. Left eye exhibits no discharge. No scleral icterus.   Neck: Normal range of motion. Neck supple. No JVD present. No tracheal deviation present. No thyromegaly present.   Cardiovascular: Normal rate, regular rhythm, normal heart sounds and intact distal pulses.  Exam reveals no gallop and no friction rub.    No murmur heard.  Pulmonary/Chest: Effort normal and breath sounds normal. No stridor. No respiratory distress. She has no wheezes. She exhibits no tenderness.   Abdominal: Soft. Bowel sounds are normal. She exhibits no distension and no mass. There is no tenderness.   Musculoskeletal: Normal range of motion. She exhibits " no edema, tenderness or deformity.   Lymphadenopathy:     She has no cervical adenopathy.   Neurological: She is alert and oriented to person, place, and time. No cranial nerve deficit. She exhibits normal muscle tone. Coordination normal.   Skin: Skin is warm and dry. Capillary refill takes less than 2 seconds. No rash noted. She is not diaphoretic.   Facial hirsutism   Psychiatric: She has a normal mood and affect. Her behavior is normal. Judgment and thought content normal.   Vitals reviewed.      Medication List with Changes/Refills   New Medications   Current Medications    CIMETIDINE (TAGAMET) 800 MG TABLET    Take 1 tablet (800 mg total) by mouth every evening.    IBUPROFEN (ADVIL,MOTRIN) 800 MG TABLET    Take 1 tablet (800 mg total) by mouth 3 (three) times daily as needed for Pain (with food).    IRON BIS-GLY/FA/C/B12/CA/SUCC (IRON-150 ORAL)    Take by mouth.    LEVONORGESTREL-ETHINYL ESTRADIOL (NORDETTE) 0.15-0.03 MG PER TABLET    Take 1 tablet by mouth once daily.    METHOCARBAMOL (ROBAXIN) 750 MG TAB    Take 1 tablet (750 mg total) by mouth 2 (two) times daily as needed.    PROMETHAZINE-DEXTROMETHORPHAN (PROMETHAZINE-DM) 6.25-15 MG/5 ML SYRP    Take 5 mLs by mouth every 4 to 6 hours as needed.   Changed and/or Refilled Medications    Modified Medication Previous Medication    METFORMIN (GLUCOPHAGE-XR) 500 MG 24 HR TABLET metformin (GLUCOPHAGE-XR) 500 MG 24 hr tablet       Take 2 tablets (1,000 mg total) by mouth 2 (two) times daily with meals.    Take 2 tablets (1,000 mg total) by mouth 2 (two) times daily with meals.    SPIRONOLACTONE (ALDACTONE) 100 MG TABLET spironolactone (ALDACTONE) 100 MG tablet       Take 1 tablet (100 mg total) by mouth 2 (two) times daily.    Take 1 tablet (100 mg total) by mouth 2 (two) times daily.   Discontinued Medications    PREDNISONE (DELTASONE) 20 MG TABLET    Take 2 tab daily x 3 days, 1 tab daily x 3 days, then 1/2 tab daily x 2 days.       Diagnosis       1. Annual  physical exam    2. Insulin resistance syndrome    3. Androgen-dependent hirsutism    4. Iron deficiency anemia, unspecified iron deficiency anemia type    5. Gastroesophageal reflux disease without esophagitis    6. Morbid obesity with BMI of 50.0-59.9, adult          Assessment/ Plan     Annual physical exam  -     CBC auto differential; Future; Expected date: 07/16/2018  -     Comprehensive metabolic panel; Future; Expected date: 07/16/2018  -     TSH; Future; Expected date: 07/16/2018  -     Hemoglobin A1c; Future; Expected date: 07/16/2018  -     Insulin, random; Future; Expected date: 07/16/2018  -     Testosterone; Future; Expected date: 07/16/2018    Insulin resistance syndrome  · On metformin, diet & exercise stressed today.  · Orders:  -     CBC auto differential; Future; Expected date: 07/16/2018  -     Comprehensive metabolic panel; Future; Expected date: 07/16/2018  -     TSH; Future; Expected date: 07/16/2018  -     Hemoglobin A1c; Future; Expected date: 07/16/2018  -     Insulin, random; Future; Expected date: 07/16/2018  -     Testosterone; Future; Expected date: 07/16/2018    Androgen-dependent hirsutism  · On spironolactone, to check lab today.  · Orders:  -     CBC auto differential; Future; Expected date: 07/16/2018  -     Comprehensive metabolic panel; Future; Expected date: 07/16/2018  -     TSH; Future; Expected date: 07/16/2018  -     Hemoglobin A1c; Future; Expected date: 07/16/2018  -     Insulin, random; Future; Expected date: 07/16/2018  -     Testosterone; Future; Expected date: 07/16/2018    Iron deficiency anemia, unspecified iron deficiency anemia type  -     CBC auto differential; Future; Expected date: 07/16/2018  -     Comprehensive metabolic panel; Future; Expected date: 07/16/2018    Gastroesophageal reflux disease without esophagitis  · Avoid triggers, on Tagamet.    Morbid obesity with BMI of 50.0-59.9, adult  · Healthy diet, routine exercise, weight reduction.          Lab  pending.  Follow-up in clinic as needed.        ASIM Bass  [facility]

## 2018-07-20 ENCOUNTER — PATIENT MESSAGE (OUTPATIENT)
Dept: OBSTETRICS AND GYNECOLOGY | Facility: CLINIC | Age: 28
End: 2018-07-20

## 2018-10-16 ENCOUNTER — OFFICE VISIT (OUTPATIENT)
Dept: OBSTETRICS AND GYNECOLOGY | Facility: CLINIC | Age: 28
End: 2018-10-16
Payer: COMMERCIAL

## 2018-10-16 VITALS
DIASTOLIC BLOOD PRESSURE: 84 MMHG | SYSTOLIC BLOOD PRESSURE: 126 MMHG | BODY MASS INDEX: 54.22 KG/M2 | HEIGHT: 59 IN | WEIGHT: 268.94 LBS

## 2018-10-16 DIAGNOSIS — N92.1 MENORRHAGIA WITH IRREGULAR CYCLE: Primary | ICD-10-CM

## 2018-10-16 DIAGNOSIS — Z01.419 ENCOUNTER FOR GYNECOLOGICAL EXAMINATION WITHOUT ABNORMAL FINDING: ICD-10-CM

## 2018-10-16 PROCEDURE — 99999 PR PBB SHADOW E&M-EST. PATIENT-LVL III: CPT | Mod: PBBFAC,,, | Performed by: NURSE PRACTITIONER

## 2018-10-16 PROCEDURE — 88175 CYTOPATH C/V AUTO FLUID REDO: CPT

## 2018-10-16 PROCEDURE — 99214 OFFICE O/P EST MOD 30 MIN: CPT | Mod: S$GLB,,, | Performed by: NURSE PRACTITIONER

## 2018-10-16 NOTE — PROGRESS NOTES
Rebecca Blankenship is a 28 y.o. female  presents for recheck of her bleeding and to collect pap.  States bleeding has lightened and has become more predicatable with ocp use.  Had a headache with taking for first 2 mths and some cramping but all has resolved.     LMP: Patient's last menstrual period was 10/05/2018..     Past Medical History:   Diagnosis Date    Gastroesophageal reflux disease 2015    Insulin resistance     Iron deficiency anemia     Morbid obesity with BMI of 50.0-59.9, adult     Oligomenorrhea     Oligomenorrhea      History reviewed. No pertinent surgical history.  Social History     Socioeconomic History    Marital status: Single     Spouse name: Not on file    Number of children: 0    Years of education: Not on file    Highest education level: Not on file   Social Needs    Financial resource strain: Not on file    Food insecurity - worry: Not on file    Food insecurity - inability: Not on file    Transportation needs - medical: Not on file    Transportation needs - non-medical: Not on file   Occupational History     Employer: [employer]   Tobacco Use    Smoking status: Never Smoker    Smokeless tobacco: Never Used   Substance and Sexual Activity    Alcohol use: Yes     Alcohol/week: 0.0 - 0.6 oz     Comment: occ     Drug use: No    Sexual activity: No   Other Topics Concern    Not on file   Social History Narrative    The patient is single and has no children.  She lives alone.  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     Family History   Problem Relation Age of Onset    Colon cancer Paternal Grandfather     Breast cancer Maternal Aunt     Stomach cancer Maternal Uncle     Stomach cancer Maternal Uncle     Heart failure Maternal Grandmother     Stroke Maternal Grandmother     Hypertension Maternal Grandmother     Kidney disease Maternal Grandmother     Heart attack Maternal Uncle     Aortic  "aneurysm Maternal Aunt     Coronary artery disease Maternal Aunt         S/P stent    Coronary artery disease Maternal Uncle         S/P stent    Stroke Maternal Uncle     Hypertension Maternal Aunt     Hypertension Maternal Uncle     Fibroids Mother      OB History      Para Term  AB Living    0 0 0 0 0 0    SAB TAB Ectopic Multiple Live Births    0 0 0 0 0          /84   Ht 4' 11" (1.499 m)   Wt 122 kg (268 lb 15.4 oz)   LMP 10/05/2018   BMI 54.32 kg/m²       ROS:  Per hpi     PHYSICAL EXAM:  APPEARANCE: Well nourished, well developed, in no acute distress.  AFFECT: WNL, alert and oriented x 3  SKIN: No acne or hirsutism  PELVIC: Normal external genitalia without lesions.  Normal hair distribution.  Adequate perineal body, normal urethral meatus.  Vagina moist and well rugated without lesions or discharge.  Cervix pink, without lesions, discharge or tenderness.  No significant cystocele or rectocele.  Bimanual exam difficult due to body habitus but shows uterus to be normal size, regular, mobile and nontender    And Adnexa without masses or tenderness.    EXTREMITIES: No edema.  Physical Exam    1. Menorrhagia with irregular cycle     2. Encounter for gynecological examination without abnormal finding  Liquid-based pap smear, screening    AND PLAN:    Patient was counseled today on A.C.S. Pap guidelines and recommendations for yearly pelvic exams, mammograms and monthly self breast exams; to see her PCP for other health maintenance.     See back in 2019 will need ocp refilled in July               "

## 2018-10-23 ENCOUNTER — PATIENT MESSAGE (OUTPATIENT)
Dept: OBSTETRICS AND GYNECOLOGY | Facility: CLINIC | Age: 28
End: 2018-10-23

## 2018-11-27 ENCOUNTER — PATIENT MESSAGE (OUTPATIENT)
Dept: OBSTETRICS AND GYNECOLOGY | Facility: CLINIC | Age: 28
End: 2018-11-27

## 2019-01-06 RX ORDER — RABEPRAZOLE SODIUM 20 MG/1
TABLET, DELAYED RELEASE ORAL
Qty: 30 TABLET | Refills: 2 | OUTPATIENT
Start: 2019-01-06

## 2019-01-10 DIAGNOSIS — M54.50 BILATERAL LOW BACK PAIN WITHOUT SCIATICA: ICD-10-CM

## 2019-01-10 DIAGNOSIS — E88.810 INSULIN RESISTANCE SYNDROME: ICD-10-CM

## 2019-01-10 RX ORDER — IBUPROFEN 800 MG/1
800 TABLET ORAL 3 TIMES DAILY PRN
Qty: 90 TABLET | Refills: 0 | Status: SHIPPED | OUTPATIENT
Start: 2019-01-10 | End: 2019-07-26 | Stop reason: SDUPTHER

## 2019-01-10 RX ORDER — RABEPRAZOLE SODIUM 20 MG/1
20 TABLET, DELAYED RELEASE ORAL DAILY
Qty: 30 TABLET | Refills: 2 | Status: SHIPPED | OUTPATIENT
Start: 2019-01-10 | End: 2019-07-26 | Stop reason: SDUPTHER

## 2019-01-10 RX ORDER — METFORMIN HYDROCHLORIDE 500 MG/1
1000 TABLET, EXTENDED RELEASE ORAL 2 TIMES DAILY WITH MEALS
Qty: 120 TABLET | Refills: 6 | Status: SHIPPED | OUTPATIENT
Start: 2019-01-10 | End: 2019-09-03 | Stop reason: SDUPTHER

## 2019-05-16 ENCOUNTER — PATIENT MESSAGE (OUTPATIENT)
Dept: FAMILY MEDICINE | Facility: CLINIC | Age: 29
End: 2019-05-16

## 2019-05-30 DIAGNOSIS — N92.1 MENORRHAGIA WITH IRREGULAR CYCLE: ICD-10-CM

## 2019-05-30 RX ORDER — LEVONORGESTREL AND ETHINYL ESTRADIOL 0.15-0.03
KIT ORAL
Qty: 28 TABLET | Refills: 6 | Status: SHIPPED | OUTPATIENT
Start: 2019-05-30 | End: 2019-10-10

## 2019-07-26 DIAGNOSIS — M54.50 BILATERAL LOW BACK PAIN WITHOUT SCIATICA: ICD-10-CM

## 2019-07-26 DIAGNOSIS — L68.0 ANDROGEN-DEPENDENT HIRSUTISM: ICD-10-CM

## 2019-07-26 RX ORDER — IBUPROFEN 800 MG/1
800 TABLET ORAL 3 TIMES DAILY PRN
Qty: 90 TABLET | Refills: 0 | Status: SHIPPED | OUTPATIENT
Start: 2019-07-26 | End: 2020-04-21 | Stop reason: SDUPTHER

## 2019-07-26 RX ORDER — RABEPRAZOLE SODIUM 20 MG/1
20 TABLET, DELAYED RELEASE ORAL DAILY
Qty: 30 TABLET | Refills: 0 | Status: SHIPPED | OUTPATIENT
Start: 2019-07-26 | End: 2019-08-25

## 2019-07-26 RX ORDER — SPIRONOLACTONE 100 MG/1
100 TABLET, FILM COATED ORAL 2 TIMES DAILY
Qty: 60 TABLET | Refills: 0 | Status: SHIPPED | OUTPATIENT
Start: 2019-07-26 | End: 2019-09-03 | Stop reason: SDUPTHER

## 2019-07-26 NOTE — TELEPHONE ENCOUNTER
Informed pt of Rx refills sent to pharmacy as requested. Informed no refills encouraging to keep annual appt for continuation of care and refills as needed. Voiced understanding. Call concluded.

## 2019-07-26 NOTE — TELEPHONE ENCOUNTER
Last annual: 7/16/2018    Pt overdue for annual exam. Annual exam appt scheduled 8/29/19 at 4:30pm w/ Dr. Bihsop. Pharmacy verified.     Please advise.

## 2019-08-28 ENCOUNTER — PATIENT MESSAGE (OUTPATIENT)
Dept: OBSTETRICS AND GYNECOLOGY | Facility: CLINIC | Age: 29
End: 2019-08-28

## 2019-09-03 ENCOUNTER — OFFICE VISIT (OUTPATIENT)
Dept: FAMILY MEDICINE | Facility: CLINIC | Age: 29
End: 2019-09-03
Payer: COMMERCIAL

## 2019-09-03 ENCOUNTER — TELEPHONE (OUTPATIENT)
Dept: FAMILY MEDICINE | Facility: CLINIC | Age: 29
End: 2019-09-03

## 2019-09-03 VITALS
TEMPERATURE: 99 F | DIASTOLIC BLOOD PRESSURE: 81 MMHG | WEIGHT: 266.75 LBS | HEART RATE: 88 BPM | BODY MASS INDEX: 53.78 KG/M2 | SYSTOLIC BLOOD PRESSURE: 127 MMHG | HEIGHT: 59 IN

## 2019-09-03 DIAGNOSIS — M25.551 PAIN OF RIGHT HIP JOINT: ICD-10-CM

## 2019-09-03 DIAGNOSIS — R51.9 HEADACHE, UNSPECIFIED HEADACHE TYPE: ICD-10-CM

## 2019-09-03 DIAGNOSIS — E88.810 INSULIN RESISTANCE SYNDROME: ICD-10-CM

## 2019-09-03 DIAGNOSIS — D50.9 IRON DEFICIENCY ANEMIA, UNSPECIFIED IRON DEFICIENCY ANEMIA TYPE: ICD-10-CM

## 2019-09-03 DIAGNOSIS — Z00.00 ANNUAL PHYSICAL EXAM: Primary | ICD-10-CM

## 2019-09-03 DIAGNOSIS — L68.0 ANDROGEN-DEPENDENT HIRSUTISM: ICD-10-CM

## 2019-09-03 DIAGNOSIS — K21.9 GASTROESOPHAGEAL REFLUX DISEASE WITHOUT ESOPHAGITIS: ICD-10-CM

## 2019-09-03 DIAGNOSIS — E66.01 MORBID OBESITY WITH BMI OF 50.0-59.9, ADULT: ICD-10-CM

## 2019-09-03 PROCEDURE — 99395 PREV VISIT EST AGE 18-39: CPT | Mod: S$GLB,,, | Performed by: REGISTERED NURSE

## 2019-09-03 PROCEDURE — 99999 PR PBB SHADOW E&M-EST. PATIENT-LVL III: CPT | Mod: PBBFAC,,, | Performed by: REGISTERED NURSE

## 2019-09-03 PROCEDURE — 99395 PR PREVENTIVE VISIT,EST,18-39: ICD-10-PCS | Mod: S$GLB,,, | Performed by: REGISTERED NURSE

## 2019-09-03 PROCEDURE — 99999 PR PBB SHADOW E&M-EST. PATIENT-LVL III: ICD-10-PCS | Mod: PBBFAC,,, | Performed by: REGISTERED NURSE

## 2019-09-03 RX ORDER — METFORMIN HYDROCHLORIDE 1000 MG/1
TABLET ORAL
COMMUNITY
End: 2020-08-24

## 2019-09-03 RX ORDER — NAPROXEN SODIUM 550 MG/1
TABLET ORAL
Refills: 0 | COMMUNITY
Start: 2019-08-23 | End: 2019-09-03 | Stop reason: SDUPTHER

## 2019-09-03 RX ORDER — SPIRONOLACTONE 100 MG/1
TABLET, FILM COATED ORAL
COMMUNITY
End: 2019-10-02 | Stop reason: SDUPTHER

## 2019-09-03 RX ORDER — RABEPRAZOLE SODIUM 10 MG/1
CAPSULE, DELAYED RELEASE ORAL
COMMUNITY
End: 2019-09-03 | Stop reason: SDUPTHER

## 2019-09-03 RX ORDER — METHOCARBAMOL 100 MG/ML
INJECTION, SOLUTION INTRAMUSCULAR; INTRAVENOUS
COMMUNITY
End: 2019-09-03

## 2019-09-03 RX ORDER — NAPROXEN SODIUM 550 MG/1
TABLET ORAL
COMMUNITY
End: 2020-10-23

## 2019-09-03 NOTE — PROGRESS NOTES
"Subjective:       Patient ID: Rebecca Blankenship is a 29 y.o. female.    Chief Complaint   Patient presents with    Annual Exam    Hip Pain    Headache         HPI    Rebecca Blankenship is here today for an annual wellness exam.  I have reviewed the patient's medical history in detail and updated the computerized patient record.    Was seen at [facility] in [place] last week for pain to RT hip.  She was told it was sciatica and given RX for Flexeril and Naproxen.  Denies leg pain.  Has been gradually improving.    Reports chronic intermittent headache pain since starting OCP.  She was told "it was normal".  HA not daily, usually tolerable although persistent.  Located frontally w/out sinus issues.  No HA currently.  Treats w/ OTC med at times, does not always take medication for pain.      Review of Systems   Constitutional: Positive for unexpected weight change (down ~ 13 lbs in past year). Negative for activity change, appetite change, chills, diaphoresis, fatigue and fever.        Diet -- no soft drink, cutting back on rice/potatoes, lots of water.  Exercise --- occasionally   HENT: Negative for nasal congestion, mouth sores and tinnitus.    Eyes: Negative for discharge and visual disturbance.   Respiratory: Negative for cough, shortness of breath and wheezing.    Cardiovascular: Negative for chest pain, palpitations and leg swelling.   Gastrointestinal: Positive for abdominal pain (occ GERD usually due to spicy foods, has improved some w/ the weight loss). Negative for bloating, blood in stool, constipation, diarrhea, nausea, vomiting and reflux.   Endocrine: Negative.    Genitourinary: Negative.    Musculoskeletal: Positive for arthralgias (RT hip).   Integumentary:  Negative for rash. Negative.   Neurological: Positive for headaches. Negative for vertigo, seizures, syncope and numbness.   Hematological: Negative.    Psychiatric/Behavioral: Negative for depression and sleep disturbance. " The patient is not nervous/anxious.    Breast: negative.           Review of patient's allergies indicates:  No Known Allergies      Medication List with Changes/Refills   Current Medications    IBUPROFEN (ADVIL,MOTRIN) 800 MG TABLET    Take 1 tablet (800 mg total) by mouth 3 (three) times daily as needed for Pain (with food).    IRON BIS-GLY/FA/C/B12/CA/SUCC (IRON-150 ORAL)    Take by mouth.    KURVELO, 28, 0.15-0.03 MG PER TABLET    TAKE 1 TABLET BY MOUTH ONCE DAILY    METFORMIN (GLUCOPHAGE) 1000 MG TABLET    metformin    METHOCARBAMOL (ROBAXIN) 750 MG TAB    Take 1 tablet (750 mg total) by mouth 2 (two) times daily as needed.    NAPROXEN SODIUM (ANAPROX DS) 550 MG TABLET    Anaprox  mg tablet   Take 1 tablet every 12 hours by oral route with meals.    RABEPRAZOLE (ACIPHEX) 20 MG TABLET    Take 1 tablet (20 mg total) by mouth once daily.    SPIRONOLACTONE (ALDACTONE) 100 MG TABLET    spironolactone   Discontinued Medications    METFORMIN (GLUCOPHAGE-XR) 500 MG 24 HR TABLET    Take 2 tablets (1,000 mg total) by mouth 2 (two) times daily with meals.    METHOCARBAMOL (ROBAXIN) 100 MG/ML INJECTION    methocarbamol    NAPROXEN SODIUM (ANAPROX) 550 MG TABLET    TAKE 1 TABLET BY MOUTH EVERY 12 HOURS WITH MEALS    RABEPRAZOLE 10 MG CDRS    rabeprazole    SPIRONOLACTONE (ALDACTONE) 100 MG TABLET    Take 1 tablet (100 mg total) by mouth 2 (two) times daily.       Patient Active Problem List   Diagnosis    Morbid obesity with BMI of 50.0-59.9, adult    Gastroesophageal reflux disease    Insulin resistance syndrome    Androgen-dependent hirsutism    Iron deficiency anemia       Past Medical History:   Diagnosis Date    Gastroesophageal reflux disease 11/20/2015    Insulin resistance     Iron deficiency anemia     Morbid obesity with BMI of 50.0-59.9, adult     Oligomenorrhea        History reviewed. No pertinent surgical history.       Family History   Problem Relation Age of Onset    Colon cancer Paternal  "Grandfather     Breast cancer Maternal Aunt     Stomach cancer Maternal Uncle     Stomach cancer Maternal Uncle     Heart failure Maternal Grandmother     Stroke Maternal Grandmother     Hypertension Maternal Grandmother     Kidney disease Maternal Grandmother     Heart attack Maternal Uncle     Aortic aneurysm Maternal Aunt     Coronary artery disease Maternal Aunt         S/P stent    Coronary artery disease Maternal Uncle         S/P stent    Stroke Maternal Uncle     Hypertension Maternal Aunt     Hypertension Maternal Uncle     Fibroids Mother        Social History     Socioeconomic History    Marital status: Single    Number of children: 0   Occupational History     Employer: [employer]   Tobacco Use    Smoking status: Never Smoker    Smokeless tobacco: Never Used   Substance and Sexual Activity    Alcohol use: Yes     Alcohol/week: 0.0 - 0.6 oz     Frequency: 2-4 times a month     Drinks per session: 1 or 2     Binge frequency: Never     Comment: occ     Drug use: No    Sexual activity: Never       Objective:     Vitals:    09/03/19 1550   BP: 127/81   Pulse: 88   Temp: 99.4 °F (37.4 °C)   Weight: 121 kg (266 lb 12.1 oz)   Height: 4' 11" (1.499 m)   PainSc: 0-No pain         Physical Exam   Constitutional: She is oriented to person, place, and time. She appears well-developed and well-nourished. No distress.   HENT:   Head: Normocephalic and atraumatic.   Right Ear: External ear normal.   Left Ear: External ear normal.   Nose: Nose normal. No mucosal edema, rhinorrhea, sinus tenderness or nasal deformity. No epistaxis.   Mouth/Throat: Uvula is midline, oropharynx is clear and moist and mucous membranes are normal. Mucous membranes are not dry and not cyanotic. No oral lesions. Normal dentition. No uvula swelling. No oropharyngeal exudate or tonsillar abscesses.   Eyes: Pupils are equal, round, and reactive to light. Conjunctivae, EOM and lids are normal. Lids are everted and " swept, no foreign bodies found. Right eye exhibits no discharge and no exudate. Left eye exhibits no discharge and no exudate. Right conjunctiva is not injected. Left conjunctiva is not injected. No scleral icterus.   Neck: Normal range of motion. Neck supple. No JVD present. Carotid bruit is not present. No tracheal deviation, no edema, no erythema and normal range of motion present. No thyroid mass and no thyromegaly present.   Cardiovascular: Normal rate, regular rhythm and intact distal pulses. Exam reveals no gallop and no friction rub.   No murmur heard.  Pulses:       Dorsalis pedis pulses are 2+ on the right side, and 2+ on the left side.        Posterior tibial pulses are 2+ on the right side, and 2+ on the left side.   Pulmonary/Chest: Effort normal and breath sounds normal. No stridor. No respiratory distress. She has no wheezes. She exhibits no tenderness.   Abdominal: Soft. Bowel sounds are normal. She exhibits no distension and no mass. There is no hepatosplenomegaly. There is no tenderness. There is no rebound, no guarding and no CVA tenderness.   Musculoskeletal: Normal range of motion. She exhibits no edema or deformity.        Right hip: She exhibits tenderness. She exhibits normal range of motion, normal strength, no swelling and no crepitus.        Legs:  Lymphadenopathy:     She has no cervical adenopathy.   Neurological: She is alert and oriented to person, place, and time. She has normal strength and normal reflexes. She displays no atrophy and no tremor. No cranial nerve deficit or sensory deficit. She exhibits normal muscle tone. Coordination and gait normal.   Skin: Skin is warm, dry and intact. Capillary refill takes less than 2 seconds. No bruising and no rash noted. She is not diaphoretic. No erythema. No pallor.   Psychiatric: She has a normal mood and affect. Her speech is normal and behavior is normal. Judgment and thought content normal. Cognition and memory are normal. Cognition and  memory are not impaired.         Diagnosis       1. Annual physical exam    2. Insulin resistance syndrome    3. Androgen-dependent hirsutism    4. Gastroesophageal reflux disease without esophagitis    5. Iron deficiency anemia, unspecified iron deficiency anemia type    6. Headache, unspecified headache type    7. Pain of right hip joint    8. Morbid obesity with BMI of 50.0-59.9, adult          Assessment/ Plan     Annual physical exam  -     CBC auto differential; Future; Expected date: 09/03/2019  -     Comprehensive metabolic panel; Future; Expected date: 09/03/2019  -     TSH; Future; Expected date: 09/03/2019  -     Lipid panel; Future; Expected date: 09/03/2019  -     Hemoglobin A1c; Future; Expected date: 09/03/2019  -     Testosterone; Future; Expected date: 09/03/2019  -     Insulin, random; Future; Expected date: 09/03/2019    Insulin resistance syndrome  -     CBC auto differential; Future; Expected date: 09/03/2019  -     Comprehensive metabolic panel; Future; Expected date: 09/03/2019  -     TSH; Future; Expected date: 09/03/2019  -     Lipid panel; Future; Expected date: 09/03/2019  -     Hemoglobin A1c; Future; Expected date: 09/03/2019  -     Testosterone; Future; Expected date: 09/03/2019  -     Insulin, random; Future; Expected date: 09/03/2019    Androgen-dependent hirsutism  -     CBC auto differential; Future; Expected date: 09/03/2019  -     Comprehensive metabolic panel; Future; Expected date: 09/03/2019  -     TSH; Future; Expected date: 09/03/2019  -     Lipid panel; Future; Expected date: 09/03/2019  -     Hemoglobin A1c; Future; Expected date: 09/03/2019  -     Testosterone; Future; Expected date: 09/03/2019  -     Insulin, random; Future; Expected date: 09/03/2019    Gastroesophageal reflux disease without esophagitis  · Avoids triggers (spicy foods), med prn.    Iron deficiency anemia, unspecified iron deficiency anemia type  -     CBC auto differential; Future; Expected date:  09/03/2019  -     Comprehensive metabolic panel; Future; Expected date: 09/03/2019    Headache, unspecified headache type  · Reports HA pain since starting OCP; she has been advised to discuss further with Gynecology when she goes for her annual next month.  · For now, treat w/ OTC pain medication.  · Contact office back if pain worsens.    Pain of right hip joint  · Xray pending.  · Continue UC meds as ordered.  · Ice/heat, rest, stretching exercises.  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  -     X-Ray Hip 2 View Right; Future; Expected date: 09/03/2019    Morbid obesity with BMI of 50.0-59.9, adult  -     CBC auto differential; Future; Expected date: 09/03/2019  -     Comprehensive metabolic panel; Future; Expected date: 09/03/2019  -     TSH; Future; Expected date: 09/03/2019  -     Lipid panel; Future; Expected date: 09/03/2019  -     Hemoglobin A1c; Future; Expected date: 09/03/2019  -     Testosterone; Future; Expected date: 09/03/2019  -     Insulin, random; Future; Expected date: 09/03/2019        Lab pending.  Healthy diet, regular exercise.  Weight reduction.  Follow-up in clinic as needed.        Patient Care Team:  Gaby Bishop MD as PCP - General (Family Medicine)  Reagan Dye LPN as Care Coordinator (Internal Medicine)  Osiris Martinez NP as Nurse Practitioner (Gynecology)      ASIM Bass  [facility]

## 2019-09-17 ENCOUNTER — HOSPITAL ENCOUNTER (OUTPATIENT)
Dept: RADIOLOGY | Facility: HOSPITAL | Age: 29
Discharge: HOME | End: 2019-09-17
Attending: REGISTERED NURSE
Payer: COMMERCIAL

## 2019-09-17 DIAGNOSIS — M25.551 PAIN OF RIGHT HIP JOINT: ICD-10-CM

## 2019-09-17 PROBLEM — M16.0 OSTEOARTHRITIS OF BOTH HIPS: Status: ACTIVE | Noted: 2019-09-17

## 2019-09-17 PROCEDURE — 73502 X-RAY EXAM HIP UNI 2-3 VIEWS: CPT | Mod: TC,PO,RT

## 2019-09-17 PROCEDURE — 73502 X-RAY EXAM HIP UNI 2-3 VIEWS: CPT | Mod: 26,RT,, | Performed by: RADIOLOGY

## 2019-09-17 PROCEDURE — 73502 XR HIP 2 VIEW RIGHT: ICD-10-PCS | Mod: 26,RT,, | Performed by: RADIOLOGY

## 2019-10-02 ENCOUNTER — PATIENT MESSAGE (OUTPATIENT)
Dept: FAMILY MEDICINE | Facility: CLINIC | Age: 29
End: 2019-10-02

## 2019-10-02 RX ORDER — RABEPRAZOLE SODIUM 10 MG/1
CAPSULE, DELAYED RELEASE ORAL
OUTPATIENT
Start: 2019-10-02

## 2019-10-02 RX ORDER — SPIRONOLACTONE 100 MG/1
100 TABLET, FILM COATED ORAL DAILY
Qty: 90 TABLET | Refills: 1 | Status: SHIPPED | OUTPATIENT
Start: 2019-10-02 | End: 2020-10-23

## 2019-10-02 RX ORDER — RABEPRAZOLE SODIUM 20 MG/1
20 TABLET, DELAYED RELEASE ORAL DAILY
Qty: 90 TABLET | Refills: 1 | Status: SHIPPED | OUTPATIENT
Start: 2019-10-02 | End: 2020-04-21 | Stop reason: SDUPTHER

## 2019-10-02 NOTE — TELEPHONE ENCOUNTER
Last annual: 9/3/19  Patient requesting refill of rabeprazole as well, but there are 2 different dosages in her history. Pharmacy verified ([facility] in [place]).    MyChart Message from patient:   the rabeprozole is for indigestion if I don't take it I get indigestion bad and feel really sick. The spironlactone is for the insulin resistance. Dr fischer and yana has both prescribed these for me. These are monthly meds I have been taking for years.     /michellew

## 2019-10-08 ENCOUNTER — PATIENT MESSAGE (OUTPATIENT)
Dept: OBSTETRICS AND GYNECOLOGY | Facility: CLINIC | Age: 29
End: 2019-10-08

## 2019-10-10 ENCOUNTER — OFFICE VISIT (OUTPATIENT)
Dept: OBSTETRICS AND GYNECOLOGY | Facility: CLINIC | Age: 29
End: 2019-10-10
Payer: COMMERCIAL

## 2019-10-10 VITALS
DIASTOLIC BLOOD PRESSURE: 76 MMHG | SYSTOLIC BLOOD PRESSURE: 124 MMHG | BODY MASS INDEX: 54.58 KG/M2 | HEIGHT: 59 IN | WEIGHT: 270.75 LBS

## 2019-10-10 DIAGNOSIS — E88.810 INSULIN RESISTANCE SYNDROME: ICD-10-CM

## 2019-10-10 DIAGNOSIS — N92.1 MENORRHAGIA WITH IRREGULAR CYCLE: ICD-10-CM

## 2019-10-10 DIAGNOSIS — Z01.419 ENCOUNTER FOR GYNECOLOGICAL EXAMINATION WITHOUT ABNORMAL FINDING: Primary | ICD-10-CM

## 2019-10-10 PROCEDURE — 99999 PR PBB SHADOW E&M-EST. PATIENT-LVL III: CPT | Mod: PBBFAC,,, | Performed by: NURSE PRACTITIONER

## 2019-10-10 PROCEDURE — 99395 PR PREVENTIVE VISIT,EST,18-39: ICD-10-PCS | Mod: S$GLB,,, | Performed by: NURSE PRACTITIONER

## 2019-10-10 PROCEDURE — 99395 PREV VISIT EST AGE 18-39: CPT | Mod: S$GLB,,, | Performed by: NURSE PRACTITIONER

## 2019-10-10 PROCEDURE — 99999 PR PBB SHADOW E&M-EST. PATIENT-LVL III: ICD-10-PCS | Mod: PBBFAC,,, | Performed by: NURSE PRACTITIONER

## 2019-10-10 RX ORDER — DESOGESTREL AND ETHINYL ESTRADIOL 21-5 (28)
1 KIT ORAL DAILY
Qty: 84 TABLET | Refills: 4 | Status: SHIPPED | OUTPATIENT
Start: 2019-10-10 | End: 2020-10-23

## 2019-10-10 NOTE — PATIENT INSTRUCTIONS
Breast Health: Breast Self-Awareness  What is breast self-awareness?  Breast self-awareness is knowing how your breasts normally look and feel. Your breasts change as you go through different stages of your life. So its important to learn what is normal for your breasts. Breast self-awareness helps you notice any changes in your breasts right away. Report any changes to your healthcare provider.  Why is breast self-awareness important?  Many experts now say that women should focus on breast self-awareness instead of doing a breast self-examination (BSE). These experts include the American Cancer Society, the U.S. Preventive Services Task Force, and the American Congress of Obstetricians and Gynecologists. Some experts even advise not teaching women to do a BSE. Thats because research hasnt shown a clear benefit to doing BSEs.  Breast self-awareness is different than a BSE. Breast self-awareness isnt about following a certain method and schedule. Its about knowing what's normal for your breasts. That way you can notice even small changes right away. If you see any changes, report them to your healthcare provider.  Changes to look for  Call your healthcare provider if you find any changes in your breasts that concern you. These changes may include:  · A lump  · Nipple discharge other than breast milk, especially a bloody discharge  · Swelling  · A change in size or shape  · Skin irritation, such as redness, thickening, or dimpling of the skin  · Swollen lymph nodes in the armpit  · Nipple problems, such as pain or redness  If you find a lump  Contact your provider if you find lumpiness in one breast, feel something different in the tissue, or feel a definite lump. Sometimes lumpiness may be due to menstrual changes. But there may be reason for concern.  Your provider may want to see you right away if you have:  · Nipple discharge that is bloody  · Skin changes on your breast, such as dimpling or puckering  Its  normal to be upset if you find a lump. But its important to contact your provider right away. Remember that most breast lumps are benign. This means they are not cancer.  Date Last Reviewed: 8/10/2015  © 1945-0079 [employer]. [address] [place] [address]. All rights reserved. This information is not intended as a substitute for professional medical care. Always follow your healthcare professional's instructions.        Understanding Periods  Having a period is a normal, healthy part of becoming and being a woman. A period is the result of a cycle that takes place inside a girls body. This menstrual cycle makes it possible for women to have babies. The cycle begins with the first day of bleeding. In the middle of the cycle, ovulation occurs. This is when an egg is released and begins its journey from the ovary to the uterus.    An egg is released  · During each cycle, 1 egg grows and is released from an ovary. It finds its way to the fallopian tube.  The egg travels through a tube  · The egg moves through the fallopian tube toward the uterus. (If the egg and a mans sperm meet here, a woman becomes pregnant.)  The lining thickens  · The lining of the uterus grows thicker. This lining is made up of blood, tissue, and fluid. (The lining will nourish a growing baby during pregnancy.)  The egg and lining are shed  · About once a month, the egg and the lining of the uterus are shed through the vagina. This is called a period. (A period does not happen during pregnancy.)  Date Last Reviewed: 5/9/2015  © 2893-6230 [employer]. [address] [place] [id number]. All rights reserved. This information is not intended as a substitute for professional medical care. Always follow your healthcare professional's instructions.        Birth Control: The Pill    Birth control pills contain hormones that help prevent pregnancy. The pills are prescribed by your healthcare provider.  There are many types of birth control pills available. If you have side effects from one type of pill, tell your healthcare provider. He or she may be able to prescribe a pill that works better for you.  Pregnancy rates  Talk to your healthcare provider about the effectiveness of this birth control method.  Using the pill  · Take one pill daily. Take it at around the same time each day.  · Follow your healthcare providers guidelines on when to start your first pack of pills. You may need to use another form of birth control for a week or more after you start.  · Know what to do if you forget to take a pill. (Consult your healthcare provider or check the package.) If you miss more than one pill, you may need to use a backup method of birth control for a week or more.  Pros  · Low pregnancy rate  · No interruption to sex  · Easy to use  · Can help make periods more regular  · May lower your risk of ovarian cysts and certain cancers  · May decrease menstrual cramps, menstrual flow, and acne  Cons  · Does not protect against sexually transmitted infection (STIs)  · Requires taking a pill on time each day  · May not work as well when taken with certain other medicines (check with your pharmacist)  · May cause side effects such as nausea, irregular bleeding, headaches, breast tenderness, fatigue, or mood changes (these often go away within 3 months)  · May increase the risk of blood clots, heart attack, and stroke  The pill may not be for you  The pill may not be for you if:  · You are a smoker and over age 35  · You have high blood pressure or gallbladder, liver, cerebrovascular  or heart disease  · You have diabetes, migraines, blood clot in the vein or artery, lupus, depression, certain lipid disorders, or take medicines that interfere with the pill  In these cases, discuss the risks with your healthcare provider.  Date Last Reviewed: 3/1/2017  © 9385-6934 [employer]. [address] [place] [address], [place]  [address]. All rights reserved. This information is not intended as a substitute for professional medical care. Always follow your healthcare professional's instructions.

## 2019-10-10 NOTE — PROGRESS NOTES
CC: Well woman exam    Rebecca Blankenship is a 29 y.o. female  presents for well woman exam.  LMP: Patient's last menstrual period was 2019 (exact date)..   States cycles have been regular but having headaches and cramping with taking the ocp.  Will change and see if change helps her issues.  Also discussed option of IUD - kyleena pamphlet given to pt.     Past Medical History:   Diagnosis Date    Gastroesophageal reflux disease 2015    Insulin resistance     Iron deficiency anemia     Morbid obesity with BMI of 50.0-59.9, adult     Oligomenorrhea     Oligomenorrhea      History reviewed. No pertinent surgical history.  Social History     Socioeconomic History    Marital status: Single     Spouse name: Not on file    Number of children: 0    Years of education: Not on file    Highest education level: Not on file   Occupational History     Employer: [employer]   Social Needs    Financial resource strain: Not very hard    Food insecurity:     Worry: Sometimes true     Inability: Never true    Transportation needs:     Medical: No     Non-medical: No   Tobacco Use    Smoking status: Never Smoker    Smokeless tobacco: Never Used   Substance and Sexual Activity    Alcohol use: Yes     Alcohol/week: 0.0 - 1.0 standard drinks     Frequency: 2-4 times a month     Drinks per session: 1 or 2     Binge frequency: Never     Comment: occ     Drug use: No    Sexual activity: Never   Lifestyle    Physical activity:     Days per week: 1 day     Minutes per session: 20 min    Stress: Only a little   Relationships    Social connections:     Talks on phone: More than three times a week     Gets together: More than three times a week     Attends Rastafarian service: Not on file     Active member of club or organization: No     Attends meetings of clubs or organizations: Never     Relationship status: Living with partner   Other Topics Concern    Not on file   Social History Narrative  "   The patient is single and has no children.  She lives alone.  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     Family History   Problem Relation Age of Onset    Colon cancer Paternal Grandfather     Breast cancer Maternal Aunt     Stomach cancer Maternal Uncle     Stomach cancer Maternal Uncle     Heart failure Maternal Grandmother     Stroke Maternal Grandmother     Hypertension Maternal Grandmother     Kidney disease Maternal Grandmother     Heart attack Maternal Uncle     Aortic aneurysm Maternal Aunt     Coronary artery disease Maternal Aunt         S/P stent    Coronary artery disease Maternal Uncle         S/P stent    Stroke Maternal Uncle     Hypertension Maternal Aunt     Hypertension Maternal Uncle     Fibroids Mother      OB History        0    Para   0    Term   0       0    AB   0    Living   0       SAB   0    TAB   0    Ectopic   0    Multiple   0    Live Births   0                 /76 (BP Location: Left arm)   Ht 4' 11" (1.499 m)   Wt 122.8 kg (270 lb 11.6 oz)   LMP 2019 (Exact Date)   BMI 54.68 kg/m²       ROS:  GENERAL: Denies weight gain or weight loss. Feeling well overall.   SKIN: Denies rash or lesions.   HEAD: Denies head injury or headache.   NODES: Denies enlarged lymph nodes.   CHEST: Denies chest pain or shortness of breath.   CARDIOVASCULAR: Denies palpitations or left sided chest pain.   ABDOMEN: No abdominal pain, constipation, diarrhea, nausea, vomiting or rectal bleeding.   URINARY: No frequency, dysuria, hematuria, or burning on urination.  REPRODUCTIVE: See HPI.   BREASTS: The patient performs breast self-examination and denies pain, lumps, or nipple discharge.   HEMATOLOGIC: No easy bruisability or excessive bleeding.   MUSCULOSKELETAL: Denies joint pain or swelling.   NEUROLOGIC: Denies syncope or weakness.   PSYCHIATRIC: Denies depression, anxiety or mood swings.    PHYSICAL EXAM:  APPEARANCE: " Well nourished, well developed, in no acute distress.  AFFECT: WNL, alert and oriented x 3  SKIN: No acne or hirsutism  NECK: Neck symmetric without masses or thyromegaly  NODES: No inguinal, cervical, axillary, or femoral lymph node enlargement  CHEST: Good respiratory effect  ABDOMEN: Soft.  No tenderness or masses.  No hepatosplenomegaly.  No hernias.  BREASTS: Symmetrical, no skin changes or visible lesions.  No palpable masses, nipple discharge bilaterally.  PELVIC: Normal external genitalia without lesions.  Normal hair distribution.  Adequate perineal body, normal urethral meatus.  Vagina moist and well rugated without lesions or discharge.  Cervix pink, without lesions, discharge or tenderness.  No significant cystocele or rectocele.  Bimanual exam difficult to assess due to body habitus  uterus to be normal size, regular, mobile and nontender.  Adnexa without masses or tenderness.    EXTREMITIES: No edema.  Physical Exam    1. Encounter for gynecological examination without abnormal finding     2. Menorrhagia with irregular cycle  desog-e.estradiol/e.estradiol (KARIVA) 0.15-0.02 mgx21 /0.01 mg x 5 per tablet   3. Insulin resistance syndrome  desog-e.estradiol/e.estradiol (KARIVA) 0.15-0.02 mgx21 /0.01 mg x 5 per tablet    AND PLAN:    Patient was counseled today on A.C.S. Pap guidelines and recommendations for yearly pelvic exams, mammograms and monthly self breast exams; to see her PCP for other health maintenance.

## 2020-03-10 LAB — PAP RECOMMENDATION EXT: NORMAL

## 2020-04-21 ENCOUNTER — PATIENT MESSAGE (OUTPATIENT)
Dept: FAMILY MEDICINE | Facility: CLINIC | Age: 30
End: 2020-04-21

## 2020-04-21 DIAGNOSIS — M54.50 BILATERAL LOW BACK PAIN WITHOUT SCIATICA: ICD-10-CM

## 2020-04-21 RX ORDER — METHOCARBAMOL 750 MG/1
750 TABLET, FILM COATED ORAL 2 TIMES DAILY PRN
Qty: 60 TABLET | Refills: 0 | Status: SHIPPED | OUTPATIENT
Start: 2020-04-21 | End: 2020-10-23

## 2020-04-21 RX ORDER — METHOCARBAMOL 750 MG/1
750 TABLET, FILM COATED ORAL 2 TIMES DAILY PRN
Qty: 60 TABLET | Refills: 5 | Status: CANCELLED | OUTPATIENT
Start: 2020-04-21

## 2020-04-21 RX ORDER — RABEPRAZOLE SODIUM 20 MG/1
20 TABLET, DELAYED RELEASE ORAL DAILY
Qty: 90 TABLET | Refills: 0 | Status: SHIPPED | OUTPATIENT
Start: 2020-04-21 | End: 2020-11-12 | Stop reason: SDUPTHER

## 2020-04-21 RX ORDER — IBUPROFEN 800 MG/1
800 TABLET ORAL 3 TIMES DAILY PRN
Qty: 90 TABLET | Refills: 0 | Status: CANCELLED | OUTPATIENT
Start: 2020-04-21

## 2020-04-21 RX ORDER — IBUPROFEN 800 MG/1
800 TABLET ORAL 3 TIMES DAILY PRN
Qty: 90 TABLET | Refills: 0 | Status: SHIPPED | OUTPATIENT
Start: 2020-04-21 | End: 2021-07-02 | Stop reason: SDUPTHER

## 2020-04-22 RX ORDER — RABEPRAZOLE SODIUM 20 MG/1
20 TABLET, DELAYED RELEASE ORAL DAILY
Qty: 90 TABLET | Refills: 0 | OUTPATIENT
Start: 2020-04-22 | End: 2020-05-22

## 2020-04-22 RX ORDER — IBUPROFEN 800 MG/1
800 TABLET ORAL 3 TIMES DAILY PRN
Qty: 90 TABLET | Refills: 0 | OUTPATIENT
Start: 2020-04-22

## 2020-04-22 RX ORDER — METHOCARBAMOL 750 MG/1
750 TABLET, FILM COATED ORAL 2 TIMES DAILY PRN
Qty: 60 TABLET | Refills: 0 | OUTPATIENT
Start: 2020-04-22

## 2020-08-24 RX ORDER — METFORMIN HYDROCHLORIDE 500 MG/1
500 TABLET, EXTENDED RELEASE ORAL 2 TIMES DAILY WITH MEALS
Qty: 60 TABLET | Refills: 0 | Status: SHIPPED | OUTPATIENT
Start: 2020-08-24 | End: 2020-10-23 | Stop reason: ALTCHOICE

## 2020-10-23 ENCOUNTER — HOSPITAL ENCOUNTER (OUTPATIENT)
Dept: RADIOLOGY | Facility: HOSPITAL | Age: 30
Discharge: HOME | End: 2020-10-23
Attending: INTERNAL MEDICINE
Payer: COMMERCIAL

## 2020-10-23 ENCOUNTER — OFFICE VISIT (OUTPATIENT)
Dept: FAMILY MEDICINE | Facility: CLINIC | Age: 30
End: 2020-10-23
Payer: COMMERCIAL

## 2020-10-23 VITALS
HEIGHT: 59 IN | TEMPERATURE: 98 F | BODY MASS INDEX: 59.07 KG/M2 | DIASTOLIC BLOOD PRESSURE: 75 MMHG | SYSTOLIC BLOOD PRESSURE: 123 MMHG | WEIGHT: 293 LBS | HEART RATE: 90 BPM

## 2020-10-23 DIAGNOSIS — Z23 INFLUENZA VACCINE NEEDED: ICD-10-CM

## 2020-10-23 DIAGNOSIS — R73.03 PREDIABETES: ICD-10-CM

## 2020-10-23 DIAGNOSIS — J12.82 PNEUMONIA DUE TO COVID-19 VIRUS: ICD-10-CM

## 2020-10-23 DIAGNOSIS — D50.0 IRON DEFICIENCY ANEMIA DUE TO CHRONIC BLOOD LOSS: ICD-10-CM

## 2020-10-23 DIAGNOSIS — U07.1 PNEUMONIA DUE TO COVID-19 VIRUS: ICD-10-CM

## 2020-10-23 DIAGNOSIS — Z13.6 ENCOUNTER FOR LIPID SCREENING FOR CARDIOVASCULAR DISEASE: ICD-10-CM

## 2020-10-23 DIAGNOSIS — Z00.00 ENCOUNTER FOR ANNUAL HEALTH EXAMINATION: ICD-10-CM

## 2020-10-23 DIAGNOSIS — R07.9 CHEST PAIN, UNSPECIFIED TYPE: ICD-10-CM

## 2020-10-23 DIAGNOSIS — Z86.16 HISTORY OF 2019 NOVEL CORONAVIRUS DISEASE (COVID-19): ICD-10-CM

## 2020-10-23 DIAGNOSIS — E66.01 MORBID OBESITY: ICD-10-CM

## 2020-10-23 DIAGNOSIS — K21.9 GASTROESOPHAGEAL REFLUX DISEASE WITHOUT ESOPHAGITIS: Primary | ICD-10-CM

## 2020-10-23 DIAGNOSIS — Z11.59 ENCOUNTER FOR HEPATITIS C SCREENING TEST FOR LOW RISK PATIENT: ICD-10-CM

## 2020-10-23 DIAGNOSIS — Z13.220 ENCOUNTER FOR LIPID SCREENING FOR CARDIOVASCULAR DISEASE: ICD-10-CM

## 2020-10-23 PROCEDURE — 99214 PR OFFICE/OUTPT VISIT, EST, LEVL IV, 30-39 MIN: ICD-10-PCS | Mod: 25,S$GLB,, | Performed by: INTERNAL MEDICINE

## 2020-10-23 PROCEDURE — 93010 EKG 12-LEAD: ICD-10-PCS | Mod: S$GLB,,, | Performed by: INTERNAL MEDICINE

## 2020-10-23 PROCEDURE — 99999 PR PBB SHADOW E&M-EST. PATIENT-LVL IV: CPT | Mod: PBBFAC,,, | Performed by: INTERNAL MEDICINE

## 2020-10-23 PROCEDURE — 90686 IIV4 VACC NO PRSV 0.5 ML IM: CPT | Mod: S$GLB,,, | Performed by: INTERNAL MEDICINE

## 2020-10-23 PROCEDURE — 90471 IMMUNIZATION ADMIN: CPT | Mod: S$GLB,,, | Performed by: INTERNAL MEDICINE

## 2020-10-23 PROCEDURE — 71046 X-RAY EXAM CHEST 2 VIEWS: CPT | Mod: TC,PO

## 2020-10-23 PROCEDURE — 93005 EKG 12-LEAD: ICD-10-PCS | Mod: S$GLB,,, | Performed by: INTERNAL MEDICINE

## 2020-10-23 PROCEDURE — 71046 X-RAY EXAM CHEST 2 VIEWS: CPT | Mod: 26,,, | Performed by: RADIOLOGY

## 2020-10-23 PROCEDURE — 71046 XR CHEST PA AND LATERAL: ICD-10-PCS | Mod: 26,,, | Performed by: RADIOLOGY

## 2020-10-23 PROCEDURE — 90471 FLU VACCINE (QUAD) GREATER THAN OR EQUAL TO 3YO PRESERVATIVE FREE IM: ICD-10-PCS | Mod: S$GLB,,, | Performed by: INTERNAL MEDICINE

## 2020-10-23 PROCEDURE — 93010 ELECTROCARDIOGRAM REPORT: CPT | Mod: S$GLB,,, | Performed by: INTERNAL MEDICINE

## 2020-10-23 PROCEDURE — 99214 OFFICE O/P EST MOD 30 MIN: CPT | Mod: 25,S$GLB,, | Performed by: INTERNAL MEDICINE

## 2020-10-23 PROCEDURE — 90686 FLU VACCINE (QUAD) GREATER THAN OR EQUAL TO 3YO PRESERVATIVE FREE IM: ICD-10-PCS | Mod: S$GLB,,, | Performed by: INTERNAL MEDICINE

## 2020-10-23 PROCEDURE — 3008F BODY MASS INDEX DOCD: CPT | Mod: CPTII,S$GLB,, | Performed by: INTERNAL MEDICINE

## 2020-10-23 PROCEDURE — 3008F PR BODY MASS INDEX (BMI) DOCUMENTED: ICD-10-PCS | Mod: CPTII,S$GLB,, | Performed by: INTERNAL MEDICINE

## 2020-10-23 PROCEDURE — 93005 ELECTROCARDIOGRAM TRACING: CPT | Mod: S$GLB,,, | Performed by: INTERNAL MEDICINE

## 2020-10-23 PROCEDURE — 99999 PR PBB SHADOW E&M-EST. PATIENT-LVL IV: ICD-10-PCS | Mod: PBBFAC,,, | Performed by: INTERNAL MEDICINE

## 2020-10-23 RX ORDER — METFORMIN HYDROCHLORIDE 500 MG/1
500 TABLET ORAL 2 TIMES DAILY WITH MEALS
Qty: 180 TABLET | Refills: 3 | Status: SHIPPED | OUTPATIENT
Start: 2020-10-23 | End: 2021-12-20 | Stop reason: SDUPTHER

## 2020-10-23 NOTE — ASSESSMENT & PLAN NOTE
-symptoms controlled with daily PPI  -denies alarm symptoms, such as dysphagia, weight loss or N/V  -continue lifestyle modification with avoidance of acidic foods, caffeine, late night eating

## 2020-10-23 NOTE — PROGRESS NOTES
Assessment/Plan:    Prediabetes  Lab Results   Component Value Date    HGBA1C 5.8 (H) 09/17/2019   -currently on Metformin 500 mg BID  -due for repeat A1C    Gastroesophageal reflux disease  -symptoms controlled with daily PPI  -denies alarm symptoms, such as dysphagia, weight loss or N/V  -continue lifestyle modification with avoidance of acidic foods, caffeine, late night eating      Encounter for annual health examination  Complete history and physical was completed today.  Complete and thorough medication reconciliation was performed.  Discussed risks and benefits of medications.  Advised patient on orders and health maintenance.  We discussed old records and old labs if available.  Will request any records not available through epic.  Continue current medications listed on your summary sheet.    Chest pain, unspecified type  -atypical  -EKG today in clinic with no concerning findings  -CXR also ordered given hx of COVID-19 PNA  -follow up if symptoms persist/worsen  -ER precautions given for development of severe symptoms  ______________________________________________________________________________________________________________________________    Orders this visit:    Gastroesophageal reflux disease without esophagitis    Prediabetes  -     metFORMIN (GLUCOPHAGE) 500 MG tablet; Take 1 tablet (500 mg total) by mouth 2 (two) times daily with meals.  Dispense: 180 tablet; Refill: 3    Chest pain, unspecified type  -     IN OFFICE EKG 12-LEAD (to [facility])    Pneumonia due to COVID-19 virus    History of 2019 novel coronavirus disease (COVID-19)  -     X-Ray Chest PA And Lateral; Future; Expected date: 10/23/2020    Encounter for annual health examination  -     CBC Without Differential; Standing  -     Comprehensive Metabolic Panel; Standing  -     Lipid Panel; Standing  -     Hemoglobin A1C; Standing  -     TSH; Standing    Encounter for lipid screening for cardiovascular disease    Encounter for hepatitis C  screening test for low risk patient  -     Hepatitis C Antibody; Future; Expected date: 10/23/2020    Iron deficiency anemia due to chronic blood loss  -     Iron and TIBC; Future; Expected date: 10/23/2020  -     Ferritin; Future; Expected date: 10/23/2020    Morbid obesity  -     Ambulatory referral/consult to Weight Management Program; Future; Expected date: 10/30/2020    Influenza vaccine needed  -     Influenza - Quadrivalent (PF)      No follow-ups on file.    Dionne Cole MD  ______________________________________________________________________________________________________________________________    HPI:    Patient is a new patient to me here to establish care.  Patient is a black female with a past medical history of prediabetes, GERD, anemia, and obesity    Previous PCP: Dr. Bishop    Chest pain: Patient had COVID-19 in June. Hospitalized with pneumonia. Since then, occasionally experiencing chest pain. Described as a pressure-like pain. Lasting 2 hours or so. Occurs sporadically. Non-exertional. Had SOB before shortly after infection but this has since resolved. Denies palpitations or lower extremity edema. Denies orthopnea or PND.     Obesity/AUB/hirsutism:  Followed by gyn.  Menstrual cycles improved since being on birth control.  No evidence of ovarian cyst on ultrasound.  Normal hormone levels.  Discussed importance of lifestyle modification with diet and exercise.  Patient is open to the idea of talking with weight management program.  Will place referral.    No other complaints today.  Routine health maintenance reviewed.  Annual labs ordered. Flu shot today.    Past Medical History:  Past Medical History:   Diagnosis Date    Gastroesophageal reflux disease 11/20/2015    Insulin resistance     Iron deficiency anemia     Morbid obesity with BMI of 50.0-59.9, adult     Oligomenorrhea     Oligomenorrhea      History reviewed. No pertinent surgical history.  Review of patient's allergies  indicates:  No Known Allergies  Social History     Tobacco Use    Smoking status: Never Smoker    Smokeless tobacco: Never Used   Substance Use Topics    Alcohol use: Yes     Alcohol/week: 0.0 - 1.0 standard drinks     Frequency: 2-4 times a month     Drinks per session: 1 or 2     Binge frequency: Never     Comment: occ     Drug use: No     Family History   Problem Relation Age of Onset    Colon cancer Paternal Grandfather     Breast cancer Maternal Aunt     Stomach cancer Maternal Uncle     Stomach cancer Maternal Uncle     Heart failure Maternal Grandmother     Stroke Maternal Grandmother     Hypertension Maternal Grandmother     Kidney disease Maternal Grandmother     Heart attack Maternal Uncle     Aortic aneurysm Maternal Aunt     Coronary artery disease Maternal Aunt         S/P stent    Coronary artery disease Maternal Uncle         S/P stent    Stroke Maternal Uncle     Hypertension Maternal Aunt     Hypertension Maternal Uncle     Fibroids Mother      Current Outpatient Medications on File Prior to Visit   Medication Sig Dispense Refill    ibuprofen (ADVIL,MOTRIN) 800 MG tablet Take 1 tablet (800 mg total) by mouth 3 (three) times daily as needed for Pain (with food). 90 tablet 0    RABEprazole (ACIPHEX) 20 mg tablet Take 1 tablet (20 mg total) by mouth once daily. 90 tablet 0     No current facility-administered medications on file prior to visit.        Review of Systems   Constitutional: Positive for unexpected weight change. Negative for activity change.   HENT: Negative for hearing loss, rhinorrhea and trouble swallowing.    Eyes: Positive for visual disturbance. Negative for discharge.   Respiratory: Positive for chest tightness. Negative for wheezing.    Cardiovascular: Positive for chest pain. Negative for palpitations.   Gastrointestinal: Negative for blood in stool, constipation, diarrhea and vomiting.   Endocrine: Negative for polydipsia and polyuria.   Genitourinary:  "Positive for menstrual problem. Negative for difficulty urinating, dysuria and hematuria.   Musculoskeletal: Positive for neck pain. Negative for arthralgias and joint swelling.   Neurological: Negative for weakness and headaches.   Psychiatric/Behavioral: Negative for confusion and dysphoric mood.       Vitals:    10/23/20 1515   BP: 123/75   Pulse: 90   Temp: 98.1 °F (36.7 °C)   Weight: 134.3 kg (296 lb)   Height: 4' 11" (1.499 m)       Wt Readings from Last 3 Encounters:   10/23/20 134.3 kg (296 lb)   10/10/19 122.8 kg (270 lb 11.6 oz)   09/03/19 121 kg (266 lb 12.1 oz)       Physical Exam  Constitutional:       General: She is not in acute distress.     Appearance: Normal appearance. She is well-developed. She is obese.   HENT:      Head: Normocephalic and atraumatic.   Eyes:      Conjunctiva/sclera: Conjunctivae normal.   Neck:      Musculoskeletal: Normal range of motion and neck supple.   Cardiovascular:      Rate and Rhythm: Normal rate and regular rhythm.      Pulses: Normal pulses.      Heart sounds: Normal heart sounds. No murmur.   Pulmonary:      Effort: Pulmonary effort is normal. No respiratory distress.      Breath sounds: Normal breath sounds.   Abdominal:      General: Bowel sounds are normal. There is no distension.      Palpations: Abdomen is soft.      Tenderness: There is no abdominal tenderness.   Musculoskeletal: Normal range of motion.   Skin:     General: Skin is warm and dry.      Findings: No rash.   Neurological:      General: No focal deficit present.      Mental Status: She is alert and oriented to person, place, and time.         Health Maintenance   Topic Date Due    Hepatitis C Screening  1990    TETANUS VACCINE  02/16/2025    Lipid Panel  Completed       "

## 2020-10-23 NOTE — ASSESSMENT & PLAN NOTE
Lab Results   Component Value Date    HGBA1C 5.8 (H) 09/17/2019   -currently on Metformin 500 mg BID  -due for repeat A1C

## 2020-10-23 NOTE — PATIENT INSTRUCTIONS
Back Exercise to Keep Fit for Low Back Pain  To help in your recovery and to prevent further back problems, keep your back, abdominal muscles and legs strong. Walk daily as soon as you can. Gradually add other physical activities such as swimming and biking, which can help improve lower back strength. Begin as soon as you can do them comfortably. Do not do any exercises that make your pain a lot worse. The following are some back exercises that can help relieve low back pain.     Pelvic tilt   Repeat 5-10 times, twice per day.  Lie flat on your back (or stand with your back to a wall), knees bent, feet flat on the floor, body relaxed. Tighten your abdominal and buttock muscles, and tilt your pelvis. The curve of the small of your back should flatten towards the floor (or wall). Hold 10 seconds and then relax.       Knee raise     Repeat 5-10 times, twice per day.    Lie flat on your back, knees bent. Bring one knee slowly to your chest. Hug your knee gently. Then lower your leg toward the floor, keeping your knee bent. Do not straighten your legs. Repeat exercise with your other leg.              Partial press-up     Lie face down on a soft, firm surface. Do not turn your head to either side. Rest your arms bent at the elbows alongside your body. Relax for a few minutes. Then raise your upper body enough to lean on your elbows. Relax your lower back and legs as much as possible. Hold this position for 30 seconds at first. Gradually work up to five minutes. Or try slow press-ups. Hold each for five seconds, and repeat five to six times.              Copyright © 2012 by [employer]   Lio JACKSON, Roula MOELLER, Dariusz BANGURA, Jodi SWEENEY, Geoffrey R, Miranda B, Andrew K, Christopher C, Mayra B, Tank S, Felix HARKINS, Yuriy R. [employer]. Adult Acute and Subacute Low Back Pain. Updated November 2012.

## 2020-10-26 NOTE — PROGRESS NOTES
Results have been released via [employer]. Please verify that these have been viewed by patient. If not, please call patient with results.    I have sent a message to them with the following interpretation (see below).    I have reviewed your recent chest xray results.    Your chest xray appears to be normal. No remaining evidence of pneumonia or any other abnormal findings. Follow up with me if chest discomfort continues.

## 2020-11-03 ENCOUNTER — LAB VISIT (OUTPATIENT)
Dept: LAB | Facility: HOSPITAL | Age: 30
End: 2020-11-03
Attending: INTERNAL MEDICINE
Payer: COMMERCIAL

## 2020-11-03 DIAGNOSIS — Z00.00 ENCOUNTER FOR ANNUAL HEALTH EXAMINATION: ICD-10-CM

## 2020-11-03 DIAGNOSIS — D50.0 IRON DEFICIENCY ANEMIA DUE TO CHRONIC BLOOD LOSS: ICD-10-CM

## 2020-11-03 LAB
ALBUMIN SERPL BCP-MCNC: 3.5 G/DL (ref 3.5–5.2)
ALP SERPL-CCNC: 121 U/L (ref 55–135)
ALT SERPL W/O P-5'-P-CCNC: 30 U/L (ref 10–44)
ANION GAP SERPL CALC-SCNC: 9 MMOL/L (ref 8–16)
AST SERPL-CCNC: 28 U/L (ref 10–40)
BILIRUB SERPL-MCNC: 0.8 MG/DL (ref 0.1–1)
BUN SERPL-MCNC: 8 MG/DL (ref 6–20)
CALCIUM SERPL-MCNC: 9.3 MG/DL (ref 8.7–10.5)
CHLORIDE SERPL-SCNC: 104 MMOL/L (ref 95–110)
CHOLEST SERPL-MCNC: 184 MG/DL (ref 120–199)
CHOLEST/HDLC SERPL: 3.7 {RATIO} (ref 2–5)
CO2 SERPL-SCNC: 26 MMOL/L (ref 23–29)
CREAT SERPL-MCNC: 0.7 MG/DL (ref 0.5–1.4)
ERYTHROCYTE [DISTWIDTH] IN BLOOD BY AUTOMATED COUNT: 16.2 % (ref 11.5–14.5)
EST. GFR  (AFRICAN AMERICAN): >60 ML/MIN/1.73 M^2
EST. GFR  (NON AFRICAN AMERICAN): >60 ML/MIN/1.73 M^2
ESTIMATED AVG GLUCOSE: 131 MG/DL (ref 68–131)
FERRITIN SERPL-MCNC: 13 NG/ML (ref 20–300)
GLUCOSE SERPL-MCNC: 85 MG/DL (ref 70–110)
HBA1C MFR BLD HPLC: 6.2 % (ref 4–5.6)
HCT VFR BLD AUTO: 39.2 % (ref 37–48.5)
HDLC SERPL-MCNC: 50 MG/DL (ref 40–75)
HDLC SERPL: 27.2 % (ref 20–50)
HGB BLD-MCNC: 12.1 G/DL (ref 12–16)
IRON SERPL-MCNC: 38 UG/DL (ref 30–160)
LDLC SERPL CALC-MCNC: 116 MG/DL (ref 63–159)
MCH RBC QN AUTO: 24.9 PG (ref 27–31)
MCHC RBC AUTO-ENTMCNC: 30.9 G/DL (ref 32–36)
MCV RBC AUTO: 81 FL (ref 82–98)
NONHDLC SERPL-MCNC: 134 MG/DL
PLATELET # BLD AUTO: 357 K/UL (ref 150–350)
PMV BLD AUTO: 10.2 FL (ref 9.2–12.9)
POTASSIUM SERPL-SCNC: 4.1 MMOL/L (ref 3.5–5.1)
PROT SERPL-MCNC: 7.6 G/DL (ref 6–8.4)
RBC # BLD AUTO: 4.85 M/UL (ref 4–5.4)
SATURATED IRON: 9 % (ref 20–50)
SODIUM SERPL-SCNC: 139 MMOL/L (ref 136–145)
TOTAL IRON BINDING CAPACITY: 440 UG/DL (ref 250–450)
TRANSFERRIN SERPL-MCNC: 297 MG/DL (ref 200–375)
TRIGL SERPL-MCNC: 90 MG/DL (ref 30–150)
TSH SERPL DL<=0.005 MIU/L-ACNC: 3.03 UIU/ML (ref 0.4–4)
WBC # BLD AUTO: 5.88 K/UL (ref 3.9–12.7)

## 2020-11-03 PROCEDURE — 80061 LIPID PANEL: CPT

## 2020-11-03 PROCEDURE — 82728 ASSAY OF FERRITIN: CPT

## 2020-11-03 PROCEDURE — 36415 COLL VENOUS BLD VENIPUNCTURE: CPT | Mod: PO

## 2020-11-03 PROCEDURE — 83540 ASSAY OF IRON: CPT

## 2020-11-03 PROCEDURE — 84443 ASSAY THYROID STIM HORMONE: CPT

## 2020-11-03 PROCEDURE — 80053 COMPREHEN METABOLIC PANEL: CPT

## 2020-11-03 PROCEDURE — 83036 HEMOGLOBIN GLYCOSYLATED A1C: CPT

## 2020-11-03 PROCEDURE — 85027 COMPLETE CBC AUTOMATED: CPT

## 2020-11-09 DIAGNOSIS — D50.0 IRON DEFICIENCY ANEMIA DUE TO CHRONIC BLOOD LOSS: Primary | ICD-10-CM

## 2020-11-09 RX ORDER — FERROUS SULFATE 325(65) MG
325 TABLET ORAL
Qty: 30 TABLET | Refills: 11 | Status: SHIPPED | OUTPATIENT
Start: 2020-11-09 | End: 2021-11-09

## 2020-11-09 NOTE — PROGRESS NOTES
Results have been released via [employer]. Please verify that these have been viewed by patient. If not, please call patient with results.    Please schedule the following orders:  A1c, iron, ferritin in 12 weeks    I have sent a message to them with the following interpretation (see below).    I have reviewed your recent blood work.     Your complete blood count shows normal blood counts but your iron levels are low. Due to this, I recommend that you start a daily iron supplement. I can call this in to your pharmacy.  Iron can cause constipation so make sure to monitor the frequency of her bowel movements.  I would like to recheck your iron studies in 3 months to make sure they are improving.  Iron deficiency is likely related to your heavy menstrual cycles.  Follow-up with gyn to discuss this.    Your metabolic panel which shows your electrolytes, glucose, kidney and liver function is within normal limits.    Your cholesterol is within normal limits.    Thyroid studies are within normal limits.    Your hemoglobin A1c has increased from 5.8->6.2. Diabetes range starts at 6.5 and greater. Make sure to work on decreasing carbs in diet, along with exercise and weight loss to prevent progression to diabetes.

## 2020-12-11 ENCOUNTER — PATIENT MESSAGE (OUTPATIENT)
Dept: FAMILY MEDICINE | Facility: CLINIC | Age: 30
End: 2020-12-11

## 2020-12-11 DIAGNOSIS — F41.9 ANXIETY AND DEPRESSION: Primary | ICD-10-CM

## 2020-12-11 DIAGNOSIS — D50.0 IRON DEFICIENCY ANEMIA DUE TO CHRONIC BLOOD LOSS: ICD-10-CM

## 2020-12-11 DIAGNOSIS — F32.A ANXIETY AND DEPRESSION: Primary | ICD-10-CM

## 2020-12-11 RX ORDER — FERROUS SULFATE 325(65) MG
325 TABLET ORAL
Qty: 30 TABLET | Refills: 11 | Status: CANCELLED | OUTPATIENT
Start: 2020-12-11 | End: 2021-12-11

## 2020-12-11 NOTE — TELEPHONE ENCOUNTER
Patient scheduled for MyChart virtual visit with PCP on 01/07/2020 @ 4:20 pm and placed on WaitList.    Referral pended.

## 2020-12-11 NOTE — TELEPHONE ENCOUNTER
Okay psychiatry referral.  She would need an appointment regarding prescription.  Video would be okay.

## 2020-12-18 ENCOUNTER — OFFICE VISIT (OUTPATIENT)
Dept: PSYCHIATRY | Facility: CLINIC | Age: 30
End: 2020-12-18
Payer: COMMERCIAL

## 2020-12-18 DIAGNOSIS — F32.0 DEPRESSION, MAJOR, SINGLE EPISODE, MILD: ICD-10-CM

## 2020-12-18 DIAGNOSIS — F41.0 GENERALIZED ANXIETY DISORDER WITH PANIC ATTACKS: Primary | ICD-10-CM

## 2020-12-18 DIAGNOSIS — F32.A ANXIETY AND DEPRESSION: ICD-10-CM

## 2020-12-18 DIAGNOSIS — F41.1 GENERALIZED ANXIETY DISORDER WITH PANIC ATTACKS: Primary | ICD-10-CM

## 2020-12-18 DIAGNOSIS — F32.0 MILD MAJOR DEPRESSION, SINGLE EPISODE: ICD-10-CM

## 2020-12-18 DIAGNOSIS — F41.9 ANXIETY AND DEPRESSION: ICD-10-CM

## 2020-12-18 PROCEDURE — 90792 PSYCH DIAG EVAL W/MED SRVCS: CPT | Mod: 95,,, | Performed by: NURSE PRACTITIONER

## 2020-12-18 PROCEDURE — 90792 PR PSYCHIATRIC DIAGNOSTIC EVALUATION W/MEDICAL SERVICES: ICD-10-PCS | Mod: 95,,, | Performed by: NURSE PRACTITIONER

## 2020-12-18 RX ORDER — HYDROXYZINE HYDROCHLORIDE 10 MG/1
TABLET, FILM COATED ORAL
Qty: 60 TABLET | Refills: 1 | Status: SHIPPED | OUTPATIENT
Start: 2020-12-18 | End: 2022-01-20 | Stop reason: SDUPTHER

## 2020-12-18 RX ORDER — SERTRALINE HYDROCHLORIDE 25 MG/1
25 TABLET, FILM COATED ORAL DAILY
Qty: 30 TABLET | Refills: 1 | Status: SHIPPED | OUTPATIENT
Start: 2020-12-18 | End: 2022-01-20 | Stop reason: SDUPTHER

## 2020-12-18 NOTE — PATIENT INSTRUCTIONS
Understanding Anxiety Disorders  Almost everyone gets nervous now and then. Its normal to have knots in your stomach before a test, or for your heart to race on a first date. But an anxiety disorder is much more than a case of nerves. In fact, its symptoms may be overwhelming. But treatment can relieve many of these symptoms. Talking to your healthcare provider is the first step.    What are anxiety disorders?  An anxiety disorder causes intense feelings of panic and fear. These feelings may arise for no apparent reason. And they tend to recur again and again. They may prevent you from coping with life and cause you great distress. As a result, you may avoid anything that triggers your fear. In extreme cases, you may never leave the house. Anxiety disorders may cause other symptoms, such as:  · Obsessive thoughts you cant control  · Constant nightmares or painful thoughts of the past  · Nausea, sweating, and muscle tension  · Trouble sleeping or concentrating  What causes anxiety disorders?  Anxiety disorders tend to run in families. For some people, childhood abuse or neglect may play a role. For others, stressful life events or trauma may trigger anxiety disorders. Anxiety can trigger low self-esteem and poor coping skills.  Common anxiety disorders  · Panic disorder. This causes an intense fear of being in danger.  · Phobias. These are extreme fears of certain objects, places, or events.  · Obsessive-compulsive disorder. This causes you to have unwanted thoughts and urges. You also may perform certain actions over and over.  · Posttraumatic stress disorder. This occurs in people who have survived a terrible ordeal. It can cause nightmares and flashbacks about the event.  · Generalized anxiety disorder. This causes constant worry that can greatly disrupt your life.   Getting better  You may believe that nothing can help you. Or, you might fear what others may think. But most anxiety symptoms can be eased.  Having an anxiety disorder is nothing to be ashamed of. Most people do best with treatment that combines medicine and therapy. These arent cures. But they can help you live a healthier life.  Date Last Reviewed: 2/1/2017 © 2000-2017 [employer]. [address] [place] [address], [place] [address]. All rights reserved. This information is not intended as a substitute for professional medical care. Always follow your healthcare professional's instructions.          Depression  Depression is one of the most common mental health problems today. It is not just a state of unhappiness or sadness. It is a true disease. The cause seems to be related to a decrease in chemicals that transmit signals in the brain. Having a family history of depression, alcoholism, or suicide increases the risk. Chronic illness, chronic pain, migraine headaches and high emotional stress also increase the risk.  Depression is something we tend to recognize in others, but may have a hard time seeing in ourselves. It can show in many physical and emotional ways:  · Loss of appetite  · Over-eating  · Not being able to sleep  · Sleeping too much  · Tiredness not related to physical exertion  · Restlessness or irritability  · Slowness of movement or speech  · Feeling depressed or withdrawn  · Loss of interest in things you once enjoyed  · Trouble concentrating, poor memory, trouble making decisions  · Thoughts of harming or killing oneself, or thoughts that life is not worth living  · Low self-esteem  The treatment for depression may include both medicine and psychotherapy. Antidepressants can reduce suffering and can improve the ability to function during the depressed period. Therapy can offer emotional support and help you understand emotional factors that may be causing the depression.  Home care  · On-going care and support helps people manage this disease.  Find a healthcare provider and therapist who meet your needs. Seek help when you  feel like you may be getting ill.  · Be kind to yourself. Make it a point to do things that you enjoy (gardening, walking in nature, going to a movie, etc.). Reward yourself for small successes.  · Take care of your physical body. Eat a balanced diet (low in saturated fat and high in fruits and vegetables). Exercise at least 3 times a week for 30 minutes. Even mild-moderate exercise (like brisk walking) can make you feel better.  · Avoid alcohol, which can make depression worse.  · Take medicine as prescribed.  · Tell each of your healthcare providers about all of the prescription drugs, over-the-counter medicines, vitamins, and supplements you take. Certain supplements interact with medicines and can result in dangerous side effects. Ask your pharmacist when you have questions about drug interactions.  · Talk with your family and trusted friends about your feelings and thoughts. Ask them to help you recognize behavior changes early so you can get help and, if needed, medicine can be adjusted.  Follow-up care  Follow up with your healthcare provider, or as advised.  Call 911  Call 911 if you:  · Have suicidal thoughts, a suicide plan, and the means to carry out the plan  · Have trouble breathing  · Are very confused  · Feel very drowsy or have trouble awakening  · Faint or lose consciousness  · Have new chest pain that becomes more severe, lasts longer, or spreads into your shoulder, arm, neck, jaw or back  When to seek medical advice  Call your healthcare provider right away if any of these occur:  · Feeling extreme depression, fear, anxiety, or anger toward yourself or others  · Feeling out of control  · Feeling that you may try to harm yourself or another  · Hearing voices that others do not hear  · Seeing things that others do not see  · Cant sleep or eat for 3 days in a row  · Friends or family express concern over your behavior and ask you to seek help  Date Last Reviewed: 9/29/2015  © 0400-2627 [employer]. [address] [place] [address], [place] [address]. All rights reserved. This information is not intended as a substitute for professional medical care. Always follow your healthcare professional's instructions.

## 2020-12-18 NOTE — PROGRESS NOTES
"Outpatient Psychiatry Initial Visit (MD/NP)      Disclaimer: Evaluation and treatment is based on information presented to date. Any new information may affect assessment and findings.     The patient location is: home      Visit type: audiovisual    60 minutes of total time spent on the encounter, which includes face to face time and non-face to face time preparing to see the patient (eg, review of tests), Obtaining and/or reviewing separately obtained history, Documenting clinical information in the electronic or other health record, Independently interpreting results (not separately reported) and communicating results to the patient/family/caregiver, or Care coordination (not separately reported).         Each patient to whom he or she provides medical services by telemedicine is:  (1) informed of the relationship between the physician and patient and the respective role of any other health care provider with respect to management of the patient; and (2) notified that he or she may decline to receive medical services by telemedicine and may withdraw from such care at any time.            Crisis Disclaimer: Patient was informed that due to the virtual nature of the visit, that if a crisis develops, protocols will be implemented to ensure patient safety, including but not limited to: 1) Initiating a welfare check with local Law Enforcement, 2) Calling [facility]/National Crisis Hotline, and/or 3) Initiating PEC/CEC procedures.       Note: I reviewed Epic EHR_"Encounters" Info for general background info.     reviewed this date            12/18/2020    Rebecca Blankenship, a 30 y.o. female, presenting for initial evaluation visit. Met with patient.    Reason for Encounter: Referral from Dr. Cole.  " anxiety and depression"     History of Present Illness: states she was seen in the ER on 12/12, at [facility], she felt it was an anxiety attack; however, the ER doctor " told me it may be something else, did not " "have me see anyone else" she was given vistaril 25 mg, " it just put me to sleep" states " it felt like I was having chest pain and shortness of breath, which lasted all night"  " I had gotten into argument with my "      Verbalizes she has never been diagnosed with depression. States she has been feeling depressed for the past 3 years and feels it has worsened in the last couple of months.     Verbalizes depressed mood most of the day, " couple of days a week, if I am busy I don't think about it"; denies anhedonia;  changes in sleep -  " sometimes I sleep more and sometimes, not as much, sleeps avg of 7 hours  ;loss of energy, mental and emotional fatigue; feelings of hopelessness " denies feelings of worthlessness;  Endorses diminished ability to concentrate. Psychomotor retardation almost every day; crying episodes; not wanting to get out of bed. Denies suicidal ideations;   Never attempted suicide; no self injury history.     Anxiety: describes  excessive anxiety and worry, occurring 3-4 days a week; has panic attacks once a month.    Worries about " everything"  Had this all of her life.  Patient finds it difficult to control the worry; constantly feels on edge; easily fatigued; has difficulty concentrating; irritability; muscle tension; difficulty falling asleep and sleep is not restorative.    Has panic attacks with chest tightness; sob; diaphoresis  Panic attacks occur 1-2 times a month.      Recently  in July.  States when they have arguments feels like she has anxiety attacks.   Works at [employer].  Had anxiety as teenager    Trauma: no history; no physical or sexual abuse history    Denies suicidal or homicidal ideations; denies self-injury, cutting or burning    She is currently not practicing safe sex, as she is trying to get pregnant.  Recent pregnancy test negative.  Obtained 12/12   Denies pregnancy at this time.    Discussed the risks involved with taking any medications " and pregnancy.      Discussed in depth with patient the risks involved with taking psychotropics and pregnancy.   There is not a large amount of available information nor is the information available,  able to exclude unwanted serious effects associated with the use of all antidepressants and other medications on mother-fetus dyads. Furthermore, more than a few studies suggest increased  risk for pregnant women  during gestation. published evidence about the effects on long-term infant neurodevelopment of drug exposure through both placenta and breast milk is represented only by sporadic case reports.     States she has reviewed the risks and benefits with NP and is wanting to continue with treatment and medications even though she is trying to get pregnant.     discussed the ultimate decision with regard to reasonable risk, and what constitutes it, rests with her as the parent and patient.  Patient was offered therapy alone; however, decided to continue with medication use.  Patient verbalized understanding of all education and desire to use medication.     Discussed if she becomes pregnant, to contact NP immediately and we will discuss measures to take from that point.     She will regularly perform home pregnancy tests and will obtain labs at next visit as they were just obtained.               Symptom Clusters:  Depressive Disorder: REPORTS depressed mood, tired/fatigued, guilt, concentration problems, hopelessness.   Anxiety Disorder: panic attacks, hyperarousal symptoms, irritability, muscle tension, concentration problems, excessive worry, avoidance symptoms.   Manic Disorder: DENIES none.   Psychotic Disorder: DENIES none.   Substance Use:  REPORTS cannabis.   Physical or Sexual Abuse: DENIES none.       Review Of Systems:     Review of Systems   Constitutional: Negative.  Negative for activity change, appetite change and unexpected weight change.   HENT: Negative.    Eyes: Negative.  Negative for visual  "disturbance.   Respiratory: Negative.    Cardiovascular: Negative.    Gastrointestinal: Negative.  Negative for constipation, diarrhea and nausea.   Endocrine: Negative.    Genitourinary: Negative.    Musculoskeletal: Negative.    Skin: Negative.  Negative for color change.   Allergic/Immunologic: Negative.  Negative for food allergies.   Neurological: Negative.  Negative for dizziness, tremors, seizures, speech difficulty, weakness, light-headedness and headaches.   Hematological: Negative.    Psychiatric/Behavioral: Positive for dysphoric mood. Negative for agitation, behavioral problems, confusion, decreased concentration, hallucinations, self-injury, sleep disturbance and suicidal ideas. The patient is nervous/anxious. The patient is not hyperactive.        Current Evaluation:           Constitutional  Vitals:  Most recent vital signs, dated less than 90 days prior to this appointment, were reviewed.      Last 3 sets of Vitals    Vitals - 1 value per visit 9/3/2019 10/10/2019 10/23/2020   SYSTOLIC 127 124 123   DIASTOLIC 81 76 75   PULSE 88 - 90   TEMPERATURE 99.4 - 98.1   RESPIRATIONS - - -   SPO2 - - -   Weight (lb) 266.76 270.73 296   Weight (kg) 121 122.8 134.265   HEIGHT 4' 11" 4' 11" 4' 11"   BODY MASS INDEX 53.88 54.68 59.78   VISIT REPORT - - -   Pain Score  0 0 0       General:  unremarkable, age appropriate, casually dressed, neatly groomed, obese     Musculoskeletal  Muscle Strength/Tone:  not examined   Gait & Station:  non-ataxic     Psychiatric  Speech:  no latency; no press   Mood & Affect:  depressed  blunted   Thought Process:  normal and logical, abstract, goal-directed   Associations:  intact   Thought Content:  normal, no suicidality, no homicidality, delusions, or paranoia   Insight:  intact, has awareness of illness   Judgement: behavior is adequate to circumstances, age appropriate   Orientation:  grossly intact, person, place, situation, time/date, day of week, month of year, year " "  Memory: intact for content of interview, memory >3 objects at five mins, able to remember recent events- yes, able to remember remote events- yes   Language: grossly intact, able to name, able to repeat   Attention Span & Concentration:  able to focus, completed tasks   Fund of Knowledge:  intact and appropriate to age and level of education, familiar with aspects of current personal life, 4 of 4 recent presidents       Relevant Elements of Neurological Exam: normal gait        Psychiatric history:   none    Previous Suicide Attempt: denies    Previous Psychotropic Medications: vistaril 25 mg; nothing else"     Medical history: insulin resistant diabetes, iron deficiency anemia, GERD      Family history of psychiatric illness: sister - anxiety and depression;    Social history (marriage, employment, etc.): raised by biological parents , mom and dad  in 6th grade, dad stopped " coming around for 2 years, then he came back" close to both parents; has 2 sisters, she is youngest; close to sisters;  Talks to mom. Graduated high school; Master's in college in business admin; no children;  Currently trying to get pregnant  Has been trying since July.        Legal: denies      History of Physical or Sexual Abuse:  denies history or current physical/sexual abuse      Support System: family, close friends, significant other    Substance Abuse History:    Uses cannabis " ocassionally, when I have anxiety"  Smokes " a blunt or two"  " this last week I smoked more bc of anxiety"       Laboratory Data  No visits with results within 1 Month(s) from this visit.   Latest known visit with results is:   Lab Visit on 11/03/2020   Component Date Value Ref Range Status    WBC 11/03/2020 5.88  3.90 - 12.70 K/uL Final    RBC 11/03/2020 4.85  4.00 - 5.40 M/uL Final    Hemoglobin 11/03/2020 12.1  12.0 - 16.0 g/dL Final    Hematocrit 11/03/2020 39.2  37.0 - 48.5 % Final    MCV 11/03/2020 81* 82 - 98 fL Final    MCH 11/03/2020 " 24.9* 27.0 - 31.0 pg Final    MCHC 11/03/2020 30.9* 32.0 - 36.0 g/dL Final    RDW 11/03/2020 16.2* 11.5 - 14.5 % Final    Platelets 11/03/2020 357* 150 - 350 K/uL Final    MPV 11/03/2020 10.2  9.2 - 12.9 fL Final    Sodium 11/03/2020 139  136 - 145 mmol/L Final    Potassium 11/03/2020 4.1  3.5 - 5.1 mmol/L Final    Chloride 11/03/2020 104  95 - 110 mmol/L Final    CO2 11/03/2020 26  23 - 29 mmol/L Final    Glucose 11/03/2020 85  70 - 110 mg/dL Final    BUN 11/03/2020 8  6 - 20 mg/dL Final    Creatinine 11/03/2020 0.7  0.5 - 1.4 mg/dL Final    Calcium 11/03/2020 9.3  8.7 - 10.5 mg/dL Final    Total Protein 11/03/2020 7.6  6.0 - 8.4 g/dL Final    Albumin 11/03/2020 3.5  3.5 - 5.2 g/dL Final    Total Bilirubin 11/03/2020 0.8  0.1 - 1.0 mg/dL Final    Alkaline Phosphatase 11/03/2020 121  55 - 135 U/L Final    AST 11/03/2020 28  10 - 40 U/L Final    ALT 11/03/2020 30  10 - 44 U/L Final    Anion Gap 11/03/2020 9  8 - 16 mmol/L Final    eGFR if African American 11/03/2020 >60.0  >60 mL/min/1.73 m^2 Final    eGFR if non African American 11/03/2020 >60.0  >60 mL/min/1.73 m^2 Final    Cholesterol 11/03/2020 184  120 - 199 mg/dL Final    Triglycerides 11/03/2020 90  30 - 150 mg/dL Final    HDL 11/03/2020 50  40 - 75 mg/dL Final    LDL Cholesterol 11/03/2020 116.0  63.0 - 159.0 mg/dL Final    HDL/Cholesterol Ratio 11/03/2020 27.2  20.0 - 50.0 % Final    Total Cholesterol/HDL Ratio 11/03/2020 3.7  2.0 - 5.0 Final    Non-HDL Cholesterol 11/03/2020 134  mg/dL Final    Hemoglobin A1C 11/03/2020 6.2* 4.0 - 5.6 % Final    Estimated Avg Glucose 11/03/2020 131  68 - 131 mg/dL Final    TSH 11/03/2020 3.033  0.400 - 4.000 uIU/mL Final    Iron 11/03/2020 38  30 - 160 ug/dL Final    Transferrin 11/03/2020 297  200 - 375 mg/dL Final    TIBC 11/03/2020 440  250 - 450 ug/dL Final    Saturated Iron 11/03/2020 9* 20 - 50 % Final    Ferritin 11/03/2020 13* 20.0 - 300.0 ng/mL Final          Medications  Outpatient Encounter Medications as of 12/18/2020   Medication Sig Dispense Refill    ferrous sulfate (FEOSOL) 325 mg (65 mg iron) Tab tablet Take 1 tablet (325 mg total) by mouth daily with breakfast. 30 tablet 11    hydrOXYzine HCL (ATARAX) 10 MG Tab Take 1/2 tablet ( 5 mg) to 1 tablet (10 mg) twice a day, as needed for anxiety or panic attacks 60 tablet 1    ibuprofen (ADVIL,MOTRIN) 800 MG tablet Take 1 tablet (800 mg total) by mouth 3 (three) times daily as needed for Pain (with food). 90 tablet 0    metFORMIN (GLUCOPHAGE) 500 MG tablet Take 1 tablet (500 mg total) by mouth 2 (two) times daily with meals. 180 tablet 3    RABEprazole (ACIPHEX) 20 mg tablet Take 1 tablet (20 mg total) by mouth once daily. 90 tablet 0    sertraline (ZOLOFT) 25 MG tablet Take 1 tablet (25 mg total) by mouth once daily. 30 tablet 1     No facility-administered encounter medications on file as of 12/18/2020.              Assessment - Diagnosis - Goals:     Impression:     Encounter Diagnoses   Name Primary?    Anxiety and depression     Generalized anxiety disorder with panic attacks Yes    Depression, major, single episode, mild          Strengths and Liabilities: Strength: Patient accepts guidance/feedback, Strength: Patient is expressive/articulate., Strength: Patient is intelligent., Strength: Patient is motivated for change., Strength: Patient is physically healthy., Strength: Patient has positive support network., Strength: Patient has reasonable judgment., Strength: Patient is stable., Liability: Patient lacks coping skills.    Treatment Goals:  Specify outcomes written in observable, behavioral terms:   Anxiety: reducing negative automatic thoughts, reducing physical symptoms of anxiety and reducing time spent worrying (<30 minutes/day)  Depression: increasing energy, increasing motivation, increasing self-reward for positive behaviors (one/day) and reducing negative automatic thoughts  Panic:  acquiring breathing skills, eliminating conditioned anxiety response to physical sensations and no panic attacks for 1 month    Treatment Plan/Recommendations:   · Medication Management: Continue current medications. The risks and benefits of medication were discussed with the patient.  · Referral for further treatment to social work team for psychotherapy  · The treatment plan and follow up plan were reviewed with the patient.   · Start zoloft 25 mg  · Refer for therapy  · Add atarax 10 mg tid prn for panic     Generalized anxiety disorder with panic attacks  -     sertraline (ZOLOFT) 25 MG tablet; Take 1 tablet (25 mg total) by mouth once daily.  Dispense: 30 tablet; Refill: 1  -     hydrOXYzine HCL (ATARAX) 10 MG Tab; Take 1/2 tablet ( 5 mg) to 1 tablet (10 mg) twice a day, as needed for anxiety or panic attacks  Dispense: 60 tablet; Refill: 1    Anxiety and depression  -     Ambulatory referral/consult to Psychiatry    Depression, major, single episode, mild  -     sertraline (ZOLOFT) 25 MG tablet; Take 1 tablet (25 mg total) by mouth once daily.  Dispense: 30 tablet; Refill: 1      .    Return to Clinic: 3 weeks    Reviewed pathophysiology of depression and anxiety. Discussed rationale behind treatment. Reviewed treatment options, including medication and psychotherapy. Reviewed pharmacology including onset of action, dosing, side effects, adverse reactions and duration of therapy (6-12 months).  Discussed the need to stay on medication if it is effective and not discontinue after a few weeks due to the probability of relapse.    Pt expressed appreciation for the visit today and did not have further question at this time though pt  was still informed to:     Call / Walk In if problems.    Call Report Side Effects to Pswhitney MD     Encouraged to follow up with primary care / Gen Med MD for continued monitoring of general health and wellness.    Call 911  Or go to ER if Acute Concerns (especially if any thoughts of  harm to self or other).     Understanding was expressed; and no further concerns nor questions were raised at this time.

## 2021-02-24 ENCOUNTER — PATIENT MESSAGE (OUTPATIENT)
Dept: FAMILY MEDICINE | Facility: CLINIC | Age: 31
End: 2021-02-24

## 2021-02-24 DIAGNOSIS — Z11.3 ROUTINE SCREENING FOR STI (SEXUALLY TRANSMITTED INFECTION): Primary | ICD-10-CM

## 2021-02-25 ENCOUNTER — PATIENT MESSAGE (OUTPATIENT)
Dept: FAMILY MEDICINE | Facility: CLINIC | Age: 31
End: 2021-02-25

## 2021-02-25 ENCOUNTER — LAB VISIT (OUTPATIENT)
Dept: LAB | Facility: HOSPITAL | Age: 31
End: 2021-02-25
Attending: INTERNAL MEDICINE
Payer: COMMERCIAL

## 2021-02-25 DIAGNOSIS — Z00.00 ENCOUNTER FOR ANNUAL HEALTH EXAMINATION: ICD-10-CM

## 2021-02-25 DIAGNOSIS — D50.0 IRON DEFICIENCY ANEMIA DUE TO CHRONIC BLOOD LOSS: ICD-10-CM

## 2021-02-25 DIAGNOSIS — Z11.3 ROUTINE SCREENING FOR STI (SEXUALLY TRANSMITTED INFECTION): ICD-10-CM

## 2021-02-25 DIAGNOSIS — Z11.59 ENCOUNTER FOR HEPATITIS C SCREENING TEST FOR LOW RISK PATIENT: ICD-10-CM

## 2021-02-25 PROCEDURE — 86592 SYPHILIS TEST NON-TREP QUAL: CPT

## 2021-02-25 PROCEDURE — 86803 HEPATITIS C AB TEST: CPT

## 2021-02-25 PROCEDURE — 82728 ASSAY OF FERRITIN: CPT

## 2021-02-25 PROCEDURE — 83540 ASSAY OF IRON: CPT

## 2021-02-25 PROCEDURE — 36415 COLL VENOUS BLD VENIPUNCTURE: CPT | Mod: PO

## 2021-02-25 PROCEDURE — 86703 HIV-1/HIV-2 1 RESULT ANTBDY: CPT

## 2021-02-25 PROCEDURE — 83036 HEMOGLOBIN GLYCOSYLATED A1C: CPT

## 2021-02-26 LAB
ESTIMATED AVG GLUCOSE: 123 MG/DL (ref 68–131)
FERRITIN SERPL-MCNC: 22 NG/ML (ref 20–300)
HBA1C MFR BLD: 5.9 % (ref 4–5.6)
HCV AB SERPL QL IA: NEGATIVE
HIV 1+2 AB+HIV1 P24 AG SERPL QL IA: NEGATIVE
IRON SERPL-MCNC: 28 UG/DL (ref 30–160)
RPR SER QL: NORMAL
SATURATED IRON: 7 % (ref 20–50)
TOTAL IRON BINDING CAPACITY: 382 UG/DL (ref 250–450)
TRANSFERRIN SERPL-MCNC: 258 MG/DL (ref 200–375)

## 2021-06-16 LAB
CHOLEST SERPL-MSCNC: 169 MG/DL (ref 0–200)
HDL/CHOLESTEROL RATIO: 2.8 % (ref 0–5)
HDLC SERPL-MCNC: 58 MG/DL (ref 35–70)
LDLC SERPL CALC-MCNC: 88 MG/DL (ref 0–130)
TRIGL SERPL-MCNC: 113 MG/DL (ref 0–150)
VLDL CHOLESTEROL: 23 MG/DL (ref 5–40)

## 2021-06-21 ENCOUNTER — PATIENT OUTREACH (OUTPATIENT)
Dept: ADMINISTRATIVE | Facility: HOSPITAL | Age: 31
End: 2021-06-21

## 2021-07-02 DIAGNOSIS — M54.50 BILATERAL LOW BACK PAIN WITHOUT SCIATICA: ICD-10-CM

## 2021-07-04 RX ORDER — IBUPROFEN 800 MG/1
800 TABLET ORAL 3 TIMES DAILY PRN
Qty: 90 TABLET | Refills: 0 | Status: SHIPPED | OUTPATIENT
Start: 2021-07-04 | End: 2023-12-22 | Stop reason: SDUPTHER

## 2022-01-10 RX ORDER — RABEPRAZOLE SODIUM 20 MG/1
20 TABLET, DELAYED RELEASE ORAL DAILY
Qty: 90 TABLET | Refills: 0 | Status: SHIPPED | OUTPATIENT
Start: 2022-01-10 | End: 2022-04-22 | Stop reason: SDUPTHER

## 2022-01-10 NOTE — TELEPHONE ENCOUNTER
No new care gaps identified.  Powered by [employer]. Reference number: [id number].   1/10/2022 10:49:14 AM CST

## 2022-01-20 ENCOUNTER — OFFICE VISIT (OUTPATIENT)
Dept: FAMILY MEDICINE | Facility: CLINIC | Age: 32
End: 2022-01-20
Payer: COMMERCIAL

## 2022-01-20 DIAGNOSIS — U07.1 COVID-19: ICD-10-CM

## 2022-01-20 DIAGNOSIS — R06.83 SNORING: ICD-10-CM

## 2022-01-20 DIAGNOSIS — R06.81 APNEIC EPISODE: ICD-10-CM

## 2022-01-20 DIAGNOSIS — Z76.0 MEDICATION REFILL: Primary | ICD-10-CM

## 2022-01-20 PROCEDURE — 99214 PR OFFICE/OUTPT VISIT, EST, LEVL IV, 30-39 MIN: ICD-10-PCS | Mod: 95,,, | Performed by: NURSE PRACTITIONER

## 2022-01-20 PROCEDURE — 1160F PR REVIEW ALL MEDS BY PRESCRIBER/CLIN PHARMACIST DOCUMENTED: ICD-10-PCS | Mod: CPTII,95,, | Performed by: NURSE PRACTITIONER

## 2022-01-20 PROCEDURE — 1159F MED LIST DOCD IN RCRD: CPT | Mod: CPTII,95,, | Performed by: NURSE PRACTITIONER

## 2022-01-20 PROCEDURE — 1159F PR MEDICATION LIST DOCUMENTED IN MEDICAL RECORD: ICD-10-PCS | Mod: CPTII,95,, | Performed by: NURSE PRACTITIONER

## 2022-01-20 PROCEDURE — 99214 OFFICE O/P EST MOD 30 MIN: CPT | Mod: 95,,, | Performed by: NURSE PRACTITIONER

## 2022-01-20 PROCEDURE — 1160F RVW MEDS BY RX/DR IN RCRD: CPT | Mod: CPTII,95,, | Performed by: NURSE PRACTITIONER

## 2022-01-20 RX ORDER — HYDROXYZINE HYDROCHLORIDE 10 MG/1
TABLET, FILM COATED ORAL
Qty: 60 TABLET | Refills: 0 | Status: SHIPPED | OUTPATIENT
Start: 2022-01-20 | End: 2022-08-05

## 2022-01-20 RX ORDER — METFORMIN HYDROCHLORIDE 500 MG/1
500 TABLET ORAL 2 TIMES DAILY WITH MEALS
Qty: 60 TABLET | Refills: 0 | Status: SHIPPED | OUTPATIENT
Start: 2022-01-20 | End: 2022-08-05 | Stop reason: SDUPTHER

## 2022-01-20 RX ORDER — SERTRALINE HYDROCHLORIDE 25 MG/1
25 TABLET, FILM COATED ORAL DAILY
Qty: 30 TABLET | Refills: 0 | Status: SHIPPED | OUTPATIENT
Start: 2022-01-20 | End: 2022-08-05

## 2022-01-20 NOTE — PATIENT INSTRUCTIONS
COVID Risk of Complication Score   1    1-2:Low Risk  3-6:Medium Risk  6 or greater:High Risk     Continue current medications  Lyndon annual exam with PCP  Hydrate well  Flonase OTC as directed   Rest  Tylenol OTC as directed  Report to ER immediately if symptoms worsen    Instructions for Patients with Confirmed or Suspected COVID-19    If you are awaiting your test result, you will either be called or it will be released to the patient portal.  If you have any questions about your test, please visit [email or web address] or call our COVID-19 information line at [phone].      Please isolate yourself at home.  You may leave home and/or return to work once the following conditions are met:    If you have symptoms and tested positive:   More than 5 days since symptoms first appeared AND   More than 24 hours fever free without medications AND       symptoms have improved   · For five days after ending isolation, masks are required.    If you had no symptoms but tested positive:   More than 5 days since the date of the first positive test. If you develop symptoms, then use the guidelines above  · For five days after ending isolation, masks are required.      Testing is not recommended if you are symptom free after completing isolation.

## 2022-01-20 NOTE — PROGRESS NOTES
"Subjective:       Patient ID: Rebecca Blankenship is a 31 y.o. female.    Chief Complaint: No chief complaint on file.    Medication Refill  This is a new (Pt scheduled telehealth visit for medication refill) problem. The current episode started more than 1 year ago. The problem occurs daily. The problem has been unchanged. Associated symptoms include congestion (recent COVID-19 diagnosis) and coughing (recent COVID-19 diagnosis; declines medication for cough). Pertinent negatives include no abdominal pain, anorexia, arthralgias, change in bowel habit, chest pain, chills, diaphoresis, fatigue, fever, headaches, joint swelling, myalgias, nausea, neck pain, numbness, rash, sore throat, swollen glands, urinary symptoms, vertigo, visual change, vomiting or weakness. Nothing aggravates the symptoms. She has tried nothing for the symptoms. The treatment provided significant relief.   Pt also requests sleep disorders referral for snoring, apneic episodes > 1 m. Pt diagnosed with COVID-19 on yesterday; c/o nasal congestion, cough; states, "I don't really need any medications. I'm feeling better." Pt has no other complaints today.  Past Medical History:   Diagnosis Date    Gastroesophageal reflux disease 11/20/2015    Insulin resistance     Iron deficiency anemia     Morbid obesity with BMI of 50.0-59.9, adult     Oligomenorrhea     Oligomenorrhea      Social History     Socioeconomic History    Marital status:     Number of children: 0   Occupational History     Employer: [employer]   Tobacco Use    Smoking status: Never Smoker    Smokeless tobacco: Never Used   Substance and Sexual Activity    Alcohol use: Yes     Alcohol/week: 0.0 - 1.0 standard drinks     Comment: occ     Drug use: No    Sexual activity: Never   Social History Narrative    The patient is single and has no children.  She lives alone.  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 "     Social Determinants of Health     Financial Resource Strain: Medium Risk    Difficulty of Paying Living Expenses: Somewhat hard   Food Insecurity: No Food Insecurity    Worried About Running Out of Food in the Last Year: Never true    Ran Out of Food in the Last Year: Never true   Transportation Needs: No Transportation Needs    Lack of Transportation (Medical): No    Lack of Transportation (Non-Medical): No   Physical Activity: Sufficiently Active    Days of Exercise per Week: 3 days    Minutes of Exercise per Session: 60 min   Stress: No Stress Concern Present    Feeling of Stress : Only a little   Social Connections: Unknown    Frequency of Communication with Friends and Family: More than three times a week    Frequency of Social Gatherings with Friends and Family: More than three times a week    Active Member of Clubs or Organizations: No    Attends Club or Organization Meetings: Never    Marital Status:    Housing Stability: High Risk    Unable to Pay for Housing in the Last Year: Yes    Number of Places Lived in the Last Year: 1    Unstable Housing in the Last Year: No     No past surgical history on file.    Review of Systems   Constitutional: Negative.  Negative for chills, diaphoresis, fatigue, fever and unexpected weight change.   HENT: Positive for nasal congestion (recent COVID-19 diagnosis). Negative for hearing loss, rhinorrhea, sore throat and trouble swallowing.    Eyes: Negative.  Negative for discharge and visual disturbance.   Respiratory: Positive for apnea (snoring) and cough (recent COVID-19 diagnosis; declines medication for cough). Negative for chest tightness and wheezing.    Cardiovascular: Negative.  Negative for chest pain and palpitations.   Gastrointestinal: Negative.  Negative for abdominal pain, anorexia, blood in stool, change in bowel habit, constipation, diarrhea, nausea, vomiting and change in bowel habit.   Endocrine: Negative.  Negative for  polydipsia and polyuria.   Genitourinary: Negative.  Negative for difficulty urinating, dysuria, hematuria and menstrual problem.   Musculoskeletal: Negative.  Negative for arthralgias, joint swelling, myalgias and neck pain.   Integumentary:  Negative for rash. Negative.   Allergic/Immunologic: Negative.    Neurological: Negative.  Negative for vertigo, weakness, numbness and headaches.   Psychiatric/Behavioral: Negative.  Negative for confusion and dysphoric mood.         Objective:      Physical Exam  Vitals and nursing note reviewed.   Constitutional:       Appearance: Normal appearance.   Neurological:      Mental Status: She is alert.         Assessment:       Problem List Items Addressed This Visit    None     Visit Diagnoses     Medication refill    -  Primary    Snoring        Relevant Orders    Ambulatory referral/consult to Sleep Disorders    Apneic episode        Relevant Orders    Ambulatory referral/consult to Sleep Disorders    COVID-19        Diagnosed on yesterday at Urgent Care          Plan:           Diagnoses and all orders for this visit:    Medication refill  -     hydrOXYzine HCL (ATARAX) 10 MG Tab; Take 1/2 tablet ( 5 mg) to 1 tablet (10 mg) twice a day, as needed for anxiety or panic attacks  -     metFORMIN (GLUCOPHAGE) 500 MG tablet; Take 1 tablet (500 mg total) by mouth 2 (two) times daily with meals.  -     sertraline (ZOLOFT) 25 MG tablet; Take 1 tablet (25 mg total) by mouth once daily.        Annual exam with PCP scheduled    Snoring  Apneic episode  -     Ambulatory referral/consult to Sleep Disorders; Future    COVID-19  Comments:  Diagnosed on yesterday at Urgent Care  Hydrate well  Rest   Tylenol OTC as directed  Flonase OTC as directed  COVID-19 home instructions given  Report to ER immediately if symptoms worsen    COVID Risk of Complication Score   1    1-2:Low Risk  3-6:Medium Risk  6 or greater:High Risk

## 2022-04-22 NOTE — TELEPHONE ENCOUNTER
No new care gaps identified.  Powered by [employer] by [employer]. Reference number: [id number].   4/22/2022 3:14:09 PM CDT

## 2022-04-22 NOTE — TELEPHONE ENCOUNTER
No new care gaps identified.  Powered by [employer] by [employer]. Reference number: [id number].   4/22/2022 3:13:19 PM CDT

## 2022-04-22 NOTE — TELEPHONE ENCOUNTER
Refill Routing Note   Medication(s) are not appropriate for processing by [facility] for the following reason(s):      - Patient has not been seen in over 15 months by PCP    [facility] action(s):  Defer       Medication Therapy Plan: lov 10/23/20  Medication reconciliation completed: No     Appointments  past 12m or future 3m with PCP    Date Provider   Last Visit   10/23/2020 Dionne Cole MD   Next Visit   4/22/2022 Dionne Cole MD   ED visits in past 90 days: 0        Note composed:3:29 PM 04/22/2022

## 2022-04-24 RX ORDER — RABEPRAZOLE SODIUM 20 MG/1
TABLET, DELAYED RELEASE ORAL
Qty: 90 TABLET | Refills: 0 | OUTPATIENT
Start: 2022-04-24

## 2022-04-24 RX ORDER — RABEPRAZOLE SODIUM 20 MG/1
20 TABLET, DELAYED RELEASE ORAL DAILY
Qty: 90 TABLET | Refills: 1 | Status: SHIPPED | OUTPATIENT
Start: 2022-04-24 | End: 2022-08-05 | Stop reason: SDUPTHER

## 2022-05-27 ENCOUNTER — PATIENT MESSAGE (OUTPATIENT)
Dept: FAMILY MEDICINE | Facility: CLINIC | Age: 32
End: 2022-05-27
Payer: COMMERCIAL

## 2022-06-21 LAB
CHOLESTEROL, TOTAL: 170 (ref 100–200)
HDL/CHOLESTEROL RATIO: 3.1 %
HDLC SERPL-MCNC: 55 MG/DL
LDLC SERPL CALC-MCNC: 78 MG/DL
TRIGL SERPL-MCNC: 181 MG/DL
VLDLC SERPL-MCNC: 36 MG/DL (ref 5–40)

## 2022-07-29 ENCOUNTER — PATIENT MESSAGE (OUTPATIENT)
Dept: FAMILY MEDICINE | Facility: CLINIC | Age: 32
End: 2022-07-29
Payer: COMMERCIAL

## 2022-07-29 DIAGNOSIS — B37.31 VAGINAL CANDIDIASIS: Primary | ICD-10-CM

## 2022-07-29 RX ORDER — FLUCONAZOLE 150 MG/1
150 TABLET ORAL DAILY
Qty: 1 TABLET | Refills: 0 | Status: SHIPPED | OUTPATIENT
Start: 2022-07-29 | End: 2022-07-30

## 2022-07-29 NOTE — TELEPHONE ENCOUNTER
I have signed for the following orders AND/OR meds.  Please call the patient and ask the patient to schedule the testing AND/OR inform about any medications that were sent. Medications have been sent to pharmacy listed below      No orders of the defined types were placed in this encounter.      Medications Ordered This Encounter   Medications    fluconazole (DIFLUCAN) 150 MG Tab     Sig: Take 1 tablet (150 mg total) by mouth once daily. for 1 day     Dispense:  1 tablet     Refill:  0         [facility] [address] [place] [address] [address]  Phone: [phone] Fax: [phone]    [address] [address]  Phone: [phone] Fax: [phone]

## 2022-08-05 ENCOUNTER — LAB VISIT (OUTPATIENT)
Dept: LAB | Facility: HOSPITAL | Age: 32
End: 2022-08-05
Attending: INTERNAL MEDICINE
Payer: COMMERCIAL

## 2022-08-05 ENCOUNTER — OFFICE VISIT (OUTPATIENT)
Dept: FAMILY MEDICINE | Facility: CLINIC | Age: 32
End: 2022-08-05
Payer: COMMERCIAL

## 2022-08-05 VITALS
TEMPERATURE: 98 F | HEART RATE: 64 BPM | BODY MASS INDEX: 54.49 KG/M2 | RESPIRATION RATE: 16 BRPM | WEIGHT: 270.31 LBS | HEIGHT: 59 IN | DIASTOLIC BLOOD PRESSURE: 80 MMHG | SYSTOLIC BLOOD PRESSURE: 116 MMHG | OXYGEN SATURATION: 98 %

## 2022-08-05 DIAGNOSIS — Z00.00 ENCOUNTER FOR ANNUAL HEALTH EXAMINATION: ICD-10-CM

## 2022-08-05 DIAGNOSIS — Z00.00 ENCOUNTER FOR WELLNESS EXAMINATION: Primary | ICD-10-CM

## 2022-08-05 DIAGNOSIS — R73.03 PREDIABETES: ICD-10-CM

## 2022-08-05 DIAGNOSIS — E66.01 MORBID OBESITY WITH BMI OF 50.0-59.9, ADULT: ICD-10-CM

## 2022-08-05 DIAGNOSIS — Z00.00 ENCOUNTER FOR WELLNESS EXAMINATION: ICD-10-CM

## 2022-08-05 DIAGNOSIS — K21.9 GASTROESOPHAGEAL REFLUX DISEASE WITHOUT ESOPHAGITIS: ICD-10-CM

## 2022-08-05 DIAGNOSIS — Z11.3 SCREENING EXAMINATION FOR STD (SEXUALLY TRANSMITTED DISEASE): ICD-10-CM

## 2022-08-05 LAB
ALBUMIN SERPL BCP-MCNC: 3.4 G/DL (ref 3.5–5.2)
ALP SERPL-CCNC: 101 U/L (ref 55–135)
ALT SERPL W/O P-5'-P-CCNC: 34 U/L (ref 10–44)
ANION GAP SERPL CALC-SCNC: 12 MMOL/L (ref 8–16)
AST SERPL-CCNC: 28 U/L (ref 10–40)
BILIRUB SERPL-MCNC: 0.8 MG/DL (ref 0.1–1)
BUN SERPL-MCNC: 9 MG/DL (ref 6–20)
CALCIUM SERPL-MCNC: 9 MG/DL (ref 8.7–10.5)
CHLORIDE SERPL-SCNC: 107 MMOL/L (ref 95–110)
CHOLEST SERPL-MCNC: 180 MG/DL (ref 120–199)
CHOLEST/HDLC SERPL: 3.3 {RATIO} (ref 2–5)
CO2 SERPL-SCNC: 22 MMOL/L (ref 23–29)
CREAT SERPL-MCNC: 0.7 MG/DL (ref 0.5–1.4)
ERYTHROCYTE [DISTWIDTH] IN BLOOD BY AUTOMATED COUNT: 14.5 % (ref 11.5–14.5)
EST. GFR  (NO RACE VARIABLE): >60 ML/MIN/1.73 M^2
ESTIMATED AVG GLUCOSE: 111 MG/DL (ref 68–131)
GLUCOSE SERPL-MCNC: 86 MG/DL (ref 70–110)
HBA1C MFR BLD: 5.5 % (ref 4–5.6)
HCT VFR BLD AUTO: 38.5 % (ref 37–48.5)
HDLC SERPL-MCNC: 55 MG/DL (ref 40–75)
HDLC SERPL: 30.6 % (ref 20–50)
HGB BLD-MCNC: 12.5 G/DL (ref 12–16)
LDLC SERPL CALC-MCNC: 108.4 MG/DL (ref 63–159)
MCH RBC QN AUTO: 27.5 PG (ref 27–31)
MCHC RBC AUTO-ENTMCNC: 32.5 G/DL (ref 32–36)
MCV RBC AUTO: 85 FL (ref 82–98)
NONHDLC SERPL-MCNC: 125 MG/DL
PLATELET # BLD AUTO: 287 K/UL (ref 150–450)
PMV BLD AUTO: 9.3 FL (ref 9.2–12.9)
POTASSIUM SERPL-SCNC: 4.2 MMOL/L (ref 3.5–5.1)
PROT SERPL-MCNC: 7.5 G/DL (ref 6–8.4)
RBC # BLD AUTO: 4.54 M/UL (ref 4–5.4)
SODIUM SERPL-SCNC: 141 MMOL/L (ref 136–145)
TRIGL SERPL-MCNC: 83 MG/DL (ref 30–150)
TSH SERPL DL<=0.005 MIU/L-ACNC: 3 UIU/ML (ref 0.4–4)
WBC # BLD AUTO: 4.95 K/UL (ref 3.9–12.7)

## 2022-08-05 PROCEDURE — 36415 COLL VENOUS BLD VENIPUNCTURE: CPT | Mod: PO | Performed by: INTERNAL MEDICINE

## 2022-08-05 PROCEDURE — 80053 COMPREHEN METABOLIC PANEL: CPT | Performed by: INTERNAL MEDICINE

## 2022-08-05 PROCEDURE — 1160F RVW MEDS BY RX/DR IN RCRD: CPT | Mod: CPTII,S$GLB,, | Performed by: NURSE PRACTITIONER

## 2022-08-05 PROCEDURE — 1159F MED LIST DOCD IN RCRD: CPT | Mod: CPTII,S$GLB,, | Performed by: NURSE PRACTITIONER

## 2022-08-05 PROCEDURE — 3079F DIAST BP 80-89 MM HG: CPT | Mod: CPTII,S$GLB,, | Performed by: NURSE PRACTITIONER

## 2022-08-05 PROCEDURE — 87591 N.GONORRHOEAE DNA AMP PROB: CPT | Performed by: NURSE PRACTITIONER

## 2022-08-05 PROCEDURE — 3074F SYST BP LT 130 MM HG: CPT | Mod: CPTII,S$GLB,, | Performed by: NURSE PRACTITIONER

## 2022-08-05 PROCEDURE — 87389 HIV-1 AG W/HIV-1&-2 AB AG IA: CPT | Performed by: NURSE PRACTITIONER

## 2022-08-05 PROCEDURE — 99999 PR PBB SHADOW E&M-EST. PATIENT-LVL IV: ICD-10-PCS | Mod: PBBFAC,,, | Performed by: NURSE PRACTITIONER

## 2022-08-05 PROCEDURE — 86592 SYPHILIS TEST NON-TREP QUAL: CPT | Performed by: NURSE PRACTITIONER

## 2022-08-05 PROCEDURE — 3008F PR BODY MASS INDEX (BMI) DOCUMENTED: ICD-10-PCS | Mod: CPTII,S$GLB,, | Performed by: NURSE PRACTITIONER

## 2022-08-05 PROCEDURE — 99999 PR PBB SHADOW E&M-EST. PATIENT-LVL IV: CPT | Mod: PBBFAC,,, | Performed by: NURSE PRACTITIONER

## 2022-08-05 PROCEDURE — 3008F BODY MASS INDEX DOCD: CPT | Mod: CPTII,S$GLB,, | Performed by: NURSE PRACTITIONER

## 2022-08-05 PROCEDURE — 84443 ASSAY THYROID STIM HORMONE: CPT | Performed by: INTERNAL MEDICINE

## 2022-08-05 PROCEDURE — 1159F PR MEDICATION LIST DOCUMENTED IN MEDICAL RECORD: ICD-10-PCS | Mod: CPTII,S$GLB,, | Performed by: NURSE PRACTITIONER

## 2022-08-05 PROCEDURE — 80061 LIPID PANEL: CPT | Performed by: INTERNAL MEDICINE

## 2022-08-05 PROCEDURE — 87491 CHLMYD TRACH DNA AMP PROBE: CPT | Performed by: NURSE PRACTITIONER

## 2022-08-05 PROCEDURE — 3079F PR MOST RECENT DIASTOLIC BLOOD PRESSURE 80-89 MM HG: ICD-10-PCS | Mod: CPTII,S$GLB,, | Performed by: NURSE PRACTITIONER

## 2022-08-05 PROCEDURE — 99395 PREV VISIT EST AGE 18-39: CPT | Mod: S$GLB,,, | Performed by: NURSE PRACTITIONER

## 2022-08-05 PROCEDURE — 83036 HEMOGLOBIN GLYCOSYLATED A1C: CPT | Performed by: INTERNAL MEDICINE

## 2022-08-05 PROCEDURE — 1160F PR REVIEW ALL MEDS BY PRESCRIBER/CLIN PHARMACIST DOCUMENTED: ICD-10-PCS | Mod: CPTII,S$GLB,, | Performed by: NURSE PRACTITIONER

## 2022-08-05 PROCEDURE — 85027 COMPLETE CBC AUTOMATED: CPT | Performed by: NURSE PRACTITIONER

## 2022-08-05 PROCEDURE — 3074F PR MOST RECENT SYSTOLIC BLOOD PRESSURE < 130 MM HG: ICD-10-PCS | Mod: CPTII,S$GLB,, | Performed by: NURSE PRACTITIONER

## 2022-08-05 PROCEDURE — 99395 PR PREVENTIVE VISIT,EST,18-39: ICD-10-PCS | Mod: S$GLB,,, | Performed by: NURSE PRACTITIONER

## 2022-08-05 RX ORDER — RABEPRAZOLE SODIUM 20 MG/1
20 TABLET, DELAYED RELEASE ORAL DAILY
Qty: 90 TABLET | Refills: 1 | Status: SHIPPED | OUTPATIENT
Start: 2022-08-05 | End: 2023-03-29 | Stop reason: SDUPTHER

## 2022-08-05 RX ORDER — METFORMIN HYDROCHLORIDE 500 MG/1
500 TABLET ORAL 2 TIMES DAILY WITH MEALS
Qty: 180 TABLET | Refills: 1 | Status: SHIPPED | OUTPATIENT
Start: 2022-08-05 | End: 2023-10-04

## 2022-08-05 NOTE — PROGRESS NOTES
This note is specifically for wellness visit performed today.   WELLNESS EXAM      Patient ID: Rebecca Blankenship is a 31 y.o. female with  has a past medical history of Gastroesophageal reflux disease (11/20/2015), Insulin resistance, Iron deficiency anemia, Morbid obesity with BMI of 50.0-59.9, adult, Oligomenorrhea, and Oligomenorrhea.     Chief Complaint:  Encounter for wellness exam    Well Adult Physical: Patient is a 31-year-old female here for a comprehensive physical exam.The patient reports chronic problems are controlled and stable. She requests STD screening with routine blood work. She denies active symptoms. Reports there has been no vaginal discharge, odor, pain, fever, chills, dysuria, genital sores or lesions. She denies known exposure to STD. No acute complaints. Denies any other questions, problems, or concerns.    Do you take any herbs or supplements that were not prescribed by a doctor? no   Are you taking calcium supplements? no      History: OBGYN: Dr. Sandra Hansen - [facility]  Taking oral birth control pills; denies possible pregnancy   LMP: Patient's last menstrual period was 06/24/2022.   MMG:   breast cancer in maternal aunt. N/A  PAP: No result found 3/2020  Colon cancer screening:   N/A  Health Maintenance Topics with due status: Not Due       Topic Last Completion Date    Cervical Cancer Screening 03/10/2020    Influenza Vaccine 10/23/2020    TETANUS VACCINE 02/01/2021      ==============================================  History reviewed.   Health Maintenance Due   Topic Date Due    COVID-19 Vaccine (2 - Mixed Product series) 04/12/2021     Past Medical History:  Past Medical History:   Diagnosis Date    Gastroesophageal reflux disease 11/20/2015    Insulin resistance     Iron deficiency anemia     Morbid obesity with BMI of 50.0-59.9, adult     Oligomenorrhea     Oligomenorrhea      History reviewed. No pertinent surgical history.  Review of patient's allergies  indicates:  No Known Allergies  Current Outpatient Medications on File Prior to Visit   Medication Sig Dispense Refill    ibuprofen (ADVIL,MOTRIN) 800 MG tablet Take 1 tablet (800 mg total) by mouth 3 (three) times daily as needed for Pain (with food). 90 tablet 0    [DISCONTINUED] metFORMIN (GLUCOPHAGE) 500 MG tablet Take 1 tablet (500 mg total) by mouth 2 (two) times daily with meals. 60 tablet 0    [DISCONTINUED] RABEprazole (ACIPHEX) 20 mg tablet Take 1 tablet (20 mg total) by mouth once daily. 90 tablet 1    [DISCONTINUED] hydrOXYzine HCL (ATARAX) 10 MG Tab Take 1/2 tablet ( 5 mg) to 1 tablet (10 mg) twice a day, as needed for anxiety or panic attacks 60 tablet 0    [DISCONTINUED] sertraline (ZOLOFT) 25 MG tablet Take 1 tablet (25 mg total) by mouth once daily. 30 tablet 0     No current facility-administered medications on file prior to visit.     Social History     Socioeconomic History    Marital status:     Number of children: 0   Occupational History     Employer: [employer]   Tobacco Use    Smoking status: Never Smoker    Smokeless tobacco: Never Used   Substance and Sexual Activity    Alcohol use: Yes     Alcohol/week: 0.0 - 1.0 standard drinks     Comment: occ     Drug use: Not Currently     Frequency: 2.0 times per week     Types: Marijuana    Sexual activity: Yes     Partners: Male   Social History Narrative    The patient is single and has no children.  She lives alone.  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     Social Determinants of Health     Financial Resource Strain: Low Risk     Difficulty of Paying Living Expenses: Not very hard   Food Insecurity: No Food Insecurity    Worried About Running Out of Food in the Last Year: Never true    Ran Out of Food in the Last Year: Never true   Transportation Needs: No Transportation Needs    Lack of Transportation (Medical): No    Lack of Transportation (Non-Medical): No   Physical  Activity: Sufficiently Active    Days of Exercise per Week: 3 days    Minutes of Exercise per Session: 60 min   Stress: No Stress Concern Present    Feeling of Stress : Only a little   Social Connections: Unknown    Frequency of Communication with Friends and Family: More than three times a week    Frequency of Social Gatherings with Friends and Family: Once a week    Active Member of Clubs or Organizations: No    Attends Club or Organization Meetings: Never    Marital Status:    Housing Stability: Low Risk     Unable to Pay for Housing in the Last Year: No    Number of Places Lived in the Last Year: 1    Unstable Housing in the Last Year: No     Family History   Problem Relation Age of Onset    Colon cancer Paternal Grandfather     Cancer Paternal Grandfather         Colon    Breast cancer Maternal Aunt     Cancer Maternal Aunt         Breast    Stomach cancer Maternal Uncle     Stomach cancer Maternal Uncle     Drug abuse Maternal Uncle         Mostly all druga    Heart failure Maternal Grandmother     Stroke Maternal Grandmother     Hypertension Maternal Grandmother     Kidney disease Maternal Grandmother     Heart disease Maternal Grandmother     Heart attack Maternal Uncle     Aortic aneurysm Maternal Aunt     Heart disease Maternal Aunt     Coronary artery disease Maternal Aunt         S/P stent    Coronary artery disease Maternal Uncle         S/P stent    Stroke Maternal Uncle     Hypertension Maternal Aunt     Hypertension Maternal Uncle     Fibroids Mother     Heart disease Maternal Uncle      Review of Systems   Constitutional: Negative for activity change, appetite change, chills, fatigue, fever and unexpected weight change.   HENT: Negative for congestion, hearing loss, rhinorrhea, sore throat and trouble swallowing.    Eyes: Negative for discharge and visual disturbance.   Respiratory: Negative for cough, chest tightness, shortness of breath and wheezing.     Cardiovascular: Negative for chest pain, palpitations and leg swelling.   Gastrointestinal: Negative for abdominal pain, blood in stool, constipation, diarrhea and vomiting.   Endocrine: Negative for polydipsia and polyuria.   Genitourinary: Negative for difficulty urinating, dysuria, hematuria and menstrual problem.   Musculoskeletal: Negative for arthralgias, joint swelling, myalgias and neck pain.   Skin: Negative for rash and wound.   Neurological: Negative for dizziness, weakness and headaches.   Psychiatric/Behavioral: Negative for behavioral problems, confusion and dysphoric mood. The patient is not nervous/anxious.       Objective:    Nursing note and vitals reviewed.  Vitals:    08/05/22 0722   BP: 116/80   Pulse: 64   Resp: 16   Temp: 97.7 °F (36.5 °C)     Body mass index is 54.59 kg/m².   Physical Exam  Vitals reviewed.   Constitutional:       General: She is not in acute distress.     Appearance: Normal appearance. She is obese. She is not ill-appearing.   HENT:      Head: Normocephalic and atraumatic.      Right Ear: External ear normal.      Left Ear: External ear normal.   Eyes:      Extraocular Movements: Extraocular movements intact.      Conjunctiva/sclera: Conjunctivae normal.   Cardiovascular:      Rate and Rhythm: Normal rate.      Heart sounds: Normal heart sounds.   Pulmonary:      Effort: Pulmonary effort is normal. No respiratory distress.      Breath sounds: Normal breath sounds.   Abdominal:      General: Bowel sounds are normal. There is no distension.      Palpations: Abdomen is soft.   Musculoskeletal:         General: Normal range of motion.      Cervical back: Normal range of motion.   Skin:     General: Skin is warm and dry.      Coloration: Skin is not pale.      Findings: No rash.   Neurological:      Mental Status: She is alert and oriented to person, place, and time. Mental status is at baseline.   Psychiatric:         Mood and Affect: Mood normal.         Speech: Speech  normal.         Behavior: Behavior normal. Behavior is cooperative.       Patient Outreach on 06/21/2021   Component Date Value Ref Range Status    Cholesterol 06/16/2021 169  0 - 200 mg/dL Final    HDL 06/16/2021 58  35 - 70 mg/dL Final    LDL Cholesterol 06/16/2021 88  0 - 130 mg/dL Final    Triglycerides 06/16/2021 113  0 - 150 mg/dL Final    VLDL 06/16/2021 23  5 - 40 MG/DL Final    HDL/Cholesterol Ratio 06/16/2021 2.8  0 - 5 % Final      Assessment / Plan:      1.  ANNUAL WELLNESS EXAM -patient here for annual wellness exam.  Labs ordered.  Health maintenance was reviewed and ordered.  Medications were reviewed and reconciled.   Anticipatory guidance: Don't smoke.  Healthy diet and regular exercise recommended. Vaccine recommendations discussed.  See orders.  Reviewed Anticipatory guidance, risk factor reduction interventions and counseling, Complete history , physical was completed today.  Complete and thorough medication reconciliation was performed.  Discussed risks and benefits of medications.  Advised patient on orders and health maintenance.  We discussed old records and old labs if available.  Will request any records not available through epic.  Continue current medications listed on your summary sheet.    All questions were answered. Patient had no further concerns. Advised of Wellness plan. Follow up in 1 year for ANNUAL WELLNESS EXAM    Orders Placed This Encounter   Procedures    C. trachomatis/N. gonorrhoeae by AMP DNA     Standing Status:   Future     Number of Occurrences:   1     Standing Expiration Date:   8/6/2023     Order Specific Question:   Source:     Answer:   Urine    CBC Without Differential     Standing Status:   Future     Number of Occurrences:   1     Standing Expiration Date:   10/4/2023    HIV 1/2 Ag/Ab (4th Gen)     Standing Status:   Future     Number of Occurrences:   1     Standing Expiration Date:   10/4/2023     Order Specific Question:   Release to patient      Answer:   Immediate    RPR     Standing Status:   Future     Number of Occurrences:   1     Standing Expiration Date:   8/6/2023     Order Specific Question:   Release to patient     Answer:   Immediate     Medications Ordered This Encounter   Medications    metFORMIN (GLUCOPHAGE) 500 MG tablet     Sig: Take 1 tablet (500 mg total) by mouth 2 (two) times daily with meals.     Dispense:  180 tablet     Refill:  1    RABEprazole (ACIPHEX) 20 mg tablet     Sig: Take 1 tablet (20 mg total) by mouth once daily.     Dispense:  90 tablet     Refill:  1        Demetrice Saleh NP

## 2022-08-06 LAB
C TRACH DNA SPEC QL NAA+PROBE: NOT DETECTED
N GONORRHOEA DNA SPEC QL NAA+PROBE: NOT DETECTED

## 2022-08-08 LAB
HIV 1+2 AB+HIV1 P24 AG SERPL QL IA: NEGATIVE
RPR SER QL: NORMAL

## 2022-08-11 ENCOUNTER — PATIENT OUTREACH (OUTPATIENT)
Dept: ADMINISTRATIVE | Facility: HOSPITAL | Age: 32
End: 2022-08-11
Payer: COMMERCIAL

## 2022-08-11 NOTE — LETTER
AUTHORIZATION FOR RELEASE OF   CONFIDENTIAL INFORMATION      We are seeing Rebecca Blankenship, date of birth 1990, in the clinic at [facility]. Dionne Cole MD is the patient's PCP. Rebecca Blankenship has an outstanding lab/procedure at the time we reviewed her chart. In order to help keep her health information updated, she has authorized us to request the following medical record(s):        (  )  MAMMOGRAM                                      (  )  COLONOSCOPY      ( X )  PAP SMEAR                                          (  )  OUTSIDE LAB RESULTS     (  )  DEXA SCAN                                          (  )  EYE EXAM            (  )  FOOT EXAM                                          (  )  ENTIRE RECORD     (  )  OUTSIDE IMMUNIZATIONS                 (  )  _______________         Please fax records to [facility], Kacie S Watts, MD, [phone]     If you have any questions, please contact Darrin Dawkins        Patient Name: Rebecca Blankenship  : 1990  Patient Phone #: [phone]

## 2022-09-13 ENCOUNTER — PATIENT MESSAGE (OUTPATIENT)
Dept: FAMILY MEDICINE | Facility: CLINIC | Age: 32
End: 2022-09-13
Payer: COMMERCIAL

## 2022-09-13 DIAGNOSIS — R21 RASH: Primary | ICD-10-CM

## 2022-09-15 ENCOUNTER — PATIENT MESSAGE (OUTPATIENT)
Dept: FAMILY MEDICINE | Facility: CLINIC | Age: 32
End: 2022-09-15
Payer: COMMERCIAL

## 2022-09-15 DIAGNOSIS — B37.9 YEAST INFECTION: Primary | ICD-10-CM

## 2022-09-15 NOTE — TELEPHONE ENCOUNTER
I have signed for the following orders AND/OR meds.  Please call the patient and ask the patient to schedule the testing AND/OR inform about any medications that were sent. Medications have been sent to pharmacy listed below      Orders Placed This Encounter   Procedures    Ambulatory referral/consult to Dermatology     Standing Status:   Future     Standing Expiration Date:   10/14/2023     Referral Priority:   Routine     Referral Type:   Consultation     Referral Reason:   Specialty Services Required     Requested Specialty:   Dermatology     Number of Visits Requested:   1              [facility] [address] [place] [address] [address] [address]  Phone: [phone] Fax: [phone]    [address] [address]  Phone: [phone] Fax: [phone]

## 2022-09-19 RX ORDER — FLUCONAZOLE 150 MG/1
150 TABLET ORAL DAILY
Qty: 1 TABLET | Refills: 0 | Status: SHIPPED | OUTPATIENT
Start: 2022-09-19 | End: 2022-09-20

## 2022-09-20 ENCOUNTER — PATIENT MESSAGE (OUTPATIENT)
Dept: FAMILY MEDICINE | Facility: CLINIC | Age: 32
End: 2022-09-20
Payer: COMMERCIAL

## 2022-09-20 DIAGNOSIS — R73.03 PREDIABETES: Primary | ICD-10-CM

## 2022-09-20 DIAGNOSIS — E66.01 MORBID OBESITY WITH BMI OF 50.0-59.9, ADULT: ICD-10-CM

## 2022-09-20 DIAGNOSIS — E88.819 INSULIN RESISTANCE: ICD-10-CM

## 2022-09-21 ENCOUNTER — PATIENT MESSAGE (OUTPATIENT)
Dept: FAMILY MEDICINE | Facility: CLINIC | Age: 32
End: 2022-09-21
Payer: COMMERCIAL

## 2022-09-21 RX ORDER — SEMAGLUTIDE 1.34 MG/ML
0.25 INJECTION, SOLUTION SUBCUTANEOUS
Qty: 1 PEN | Refills: 0 | Status: SHIPPED | OUTPATIENT
Start: 2022-09-21 | End: 2022-11-03 | Stop reason: SDUPTHER

## 2022-09-21 NOTE — TELEPHONE ENCOUNTER
I have signed for the following orders AND/OR meds.  Please call the patient and ask the patient to schedule the testing AND/OR inform about any medications that were sent. Medications have been sent to pharmacy listed below      No orders of the defined types were placed in this encounter.      Medications Ordered This Encounter   Medications    semaglutide (OZEMPIC) 0.25 mg or 0.5 mg(2 mg/1.5 mL) pen injector     Sig: Inject 0.25 mg into the skin every 7 days.     Dispense:  1 pen     Refill:  0         [address] [address]  Phone: [phone] Fax: [phone]    [address] [address]  Phone: [phone] Fax: [phone]

## 2022-10-02 ENCOUNTER — PATIENT MESSAGE (OUTPATIENT)
Dept: FAMILY MEDICINE | Facility: CLINIC | Age: 32
End: 2022-10-02
Payer: COMMERCIAL

## 2022-10-02 DIAGNOSIS — E55.9 VITAMIN D DEFICIENCY: Primary | ICD-10-CM

## 2022-10-03 ENCOUNTER — OFFICE VISIT (OUTPATIENT)
Dept: DERMATOLOGY | Facility: CLINIC | Age: 32
End: 2022-10-03
Payer: COMMERCIAL

## 2022-10-03 DIAGNOSIS — B36.0 TINEA VERSICOLOR: Primary | ICD-10-CM

## 2022-10-03 DIAGNOSIS — L83 ACANTHOSIS NIGRICANS: ICD-10-CM

## 2022-10-03 PROCEDURE — 99999 PR PBB SHADOW E&M-EST. PATIENT-LVL III: ICD-10-PCS | Mod: PBBFAC,,, | Performed by: STUDENT IN AN ORGANIZED HEALTH CARE EDUCATION/TRAINING PROGRAM

## 2022-10-03 PROCEDURE — 99204 PR OFFICE/OUTPT VISIT, NEW, LEVL IV, 45-59 MIN: ICD-10-PCS | Mod: S$GLB,,, | Performed by: STUDENT IN AN ORGANIZED HEALTH CARE EDUCATION/TRAINING PROGRAM

## 2022-10-03 PROCEDURE — 99999 PR PBB SHADOW E&M-EST. PATIENT-LVL III: CPT | Mod: PBBFAC,,, | Performed by: STUDENT IN AN ORGANIZED HEALTH CARE EDUCATION/TRAINING PROGRAM

## 2022-10-03 PROCEDURE — 1159F MED LIST DOCD IN RCRD: CPT | Mod: CPTII,S$GLB,, | Performed by: STUDENT IN AN ORGANIZED HEALTH CARE EDUCATION/TRAINING PROGRAM

## 2022-10-03 PROCEDURE — 1160F PR REVIEW ALL MEDS BY PRESCRIBER/CLIN PHARMACIST DOCUMENTED: ICD-10-PCS | Mod: CPTII,S$GLB,, | Performed by: STUDENT IN AN ORGANIZED HEALTH CARE EDUCATION/TRAINING PROGRAM

## 2022-10-03 PROCEDURE — 3044F HG A1C LEVEL LT 7.0%: CPT | Mod: CPTII,S$GLB,, | Performed by: STUDENT IN AN ORGANIZED HEALTH CARE EDUCATION/TRAINING PROGRAM

## 2022-10-03 PROCEDURE — 1160F RVW MEDS BY RX/DR IN RCRD: CPT | Mod: CPTII,S$GLB,, | Performed by: STUDENT IN AN ORGANIZED HEALTH CARE EDUCATION/TRAINING PROGRAM

## 2022-10-03 PROCEDURE — 3044F PR MOST RECENT HEMOGLOBIN A1C LEVEL <7.0%: ICD-10-PCS | Mod: CPTII,S$GLB,, | Performed by: STUDENT IN AN ORGANIZED HEALTH CARE EDUCATION/TRAINING PROGRAM

## 2022-10-03 PROCEDURE — 1159F PR MEDICATION LIST DOCUMENTED IN MEDICAL RECORD: ICD-10-PCS | Mod: CPTII,S$GLB,, | Performed by: STUDENT IN AN ORGANIZED HEALTH CARE EDUCATION/TRAINING PROGRAM

## 2022-10-03 PROCEDURE — 99204 OFFICE O/P NEW MOD 45 MIN: CPT | Mod: S$GLB,,, | Performed by: STUDENT IN AN ORGANIZED HEALTH CARE EDUCATION/TRAINING PROGRAM

## 2022-10-03 RX ORDER — KETOCONAZOLE 20 MG/ML
SHAMPOO, SUSPENSION TOPICAL
Qty: 120 ML | Refills: 5 | Status: SHIPPED | OUTPATIENT
Start: 2022-10-03 | End: 2023-10-04

## 2022-10-03 RX ORDER — KETOCONAZOLE 20 MG/G
CREAM TOPICAL 2 TIMES DAILY
Qty: 60 G | Refills: 5 | Status: SHIPPED | OUTPATIENT
Start: 2022-10-03 | End: 2023-10-04

## 2022-10-03 NOTE — PROGRESS NOTES
Patient Information  Name: Rebecca Blankenship  : 1990  MRN: [id number]     Referring Physician:  Dr. Mosley   Primary Care Physician:  Dr. Dionne Cole MD   Date of Visit: 10/03/2022      Subjective:       Rebecca Blankenship is a 32 y.o. female who presents for   Chief Complaint   Patient presents with    Rash     On neck. X march. Tx steriod cream and antibiotics      HPI  Patient with new complaint of lesion(s)  Location: neck  Duration: 7 months  Symptoms: none  Relieving factors/Previous treatments: aveeno, gold bond, cortisone 10    Patient was last seen:Visit date not found     Prior notes by myself reviewed.   Clinical documentation obtained by nursing staff reviewed.    Review of Systems   Skin:  Positive for rash. Negative for itching.      Objective:    Physical Exam   Constitutional: She appears well-developed and well-nourished. No distress.   Neurological: She is alert and oriented to person, place, and time. She is not disoriented.   Psychiatric: She has a normal mood and affect.   Skin:   Areas Examined (abnormalities noted in diagram):   Head / Face Inspection Performed  Neck Inspection Performed            Diagram Legend     Erythematous scaling macule/papule c/w actinic keratosis       Vascular papule c/w angioma      Pigmented verrucoid papule/plaque c/w seborrheic keratosis      Yellow umbilicated papule c/w sebaceous hyperplasia      Irregularly shaped tan macule c/w lentigo     1-2 mm smooth white papules consistent with Milia      Movable subcutaneous cyst with punctum c/w epidermal inclusion cyst      Subcutaneous movable cyst c/w pilar cyst      Firm pink to brown papule c/w dermatofibroma      Pedunculated fleshy papule(s) c/w skin tag(s)      Evenly pigmented macule c/w junctional nevus     Mildly variegated pigmented, slightly irregular-bordered macule c/w mildly atypical nevus      Flesh colored to evenly pigmented papule c/w intradermal nevus       Pink pearly  papule/plaque c/w basal cell carcinoma      Erythematous hyperkeratotic cursted plaque c/w SCC      Surgical scar with no sign of skin cancer recurrence      Open and closed comedones      Inflammatory papules and pustules      Verrucoid papule consistent consistent with wart     Erythematous eczematous patches and plaques     Dystrophic onycholytic nail with subungual debris c/w onychomycosis     Umbilicated papule    Erythematous-base heme-crusted tan verrucoid plaque consistent with inflamed seborrheic keratosis     Erythematous Silvery Scaling Plaque c/w Psoriasis     See annotation      No images are attached to the encounter or orders placed in the encounter.    [] Data reviewed  [] Independent review of test  [] Management discussed with another provider    Assessment / Plan:        Tinea versicolor  -     ketoconazole (NIZORAL) 2 % shampoo; Apply topically 3 (three) times a week. Leave on for 5-10 min before rinsing  Dispense: 120 mL; Refill: 5  -     ketoconazole (NIZORAL) 2 % cream; Apply topically 2 (two) times daily.  Dispense: 60 g; Refill: 5    Acanthosis nigricans  -     Associated with insulin resistance. Reassurance given.            LOS NUMBER AND COMPLEXITY OF PROBLEMS    COMPLEXITY OF DATA RISK TOTAL TIME (m)   60413  44864 [] 1 self-limited or minor problem [x] Minimal to none [] No treatment recommended or patient to monitor 15-29  10-19   42227  24682 Low  [] 2 or > self limited or minor problems  [] 1 stable chronic illness  [] 1 acute, uncomplicated illness or injury Limited (2)  [] Prior external notes from each unique source  [] Review result of each unique test  [] Order each unique test []  Low  OTC medications, minor skin biopsy 30-44 20-29   26572  37530 Moderate  [x]  1 or > chronic illness with progression, exacerbation or SE of treatment  []  2 or more stable chronic illnesses  []  1 acute illness with systemic symptoms  []  1 acute complicated injury  []  1 undiagnosed new problem  with uncertain prognosis Moderate (1/3 below)  []  3 or more data items        *Now includes assessment requiring independent historian  []  Independent interpretation of a test  []  Discuss management/test with another provider Moderate  [x]  Prescription drug mgmt  []  Minor surgery with risk discussed  []  Mgmt limited by social determinates 45-59  30-39   76539  76168 High  []  1 or more chronic illness with severe exacerbation, progression or SE of treatment  []  1 acute or chronic illness/injury that poses a threat to life or bodily function Extensive (2/3 below)  []  3 or more data items        *Now includes assessment requiring independent historian.  []  Independent interpretation of a test  []  Discuss management/test with another provider High  []  Major surgery with risk discussed  []  Drug therapy requiring intensive monitoring for toxicity  []  Hospitalization  []  Decision for DNR 60-74  40-54      No follow-ups on file.    Maile Mcdonald MD, FAAD  [facility]

## 2022-10-03 NOTE — TELEPHONE ENCOUNTER
I have signed for the following orders AND/OR meds.  Please call the patient and ask the patient to schedule the testing AND/OR inform about any medications that were sent. Medications have been sent to pharmacy listed below      Orders Placed This Encounter   Procedures    Vitamin D     Standing Status:   Future     Standing Expiration Date:   12/2/2023              [facility] [address] [place] [address] [facility]  [address] [place] [address]  Phone: [phone] Fax: [phone]    [address] [address]  Phone: [phone] Fax: [phone]

## 2022-10-28 NOTE — PROGRESS NOTES
CHIEF COMPLAINT: This is a 26-year-old old female here for preventive health exam.    SUBJECTIVE: Patient has insulin resistance syndrome and androgen-dependent hirsutism.  She is currently taking spironolactone 100 mg daily and metformin 1000 mg twice daily.  She skipped 2 months earlier this year, but otherwise has been having monthly menses over the last 12 months.  Patient has lost 12 pounds in the last year. Patient refuses pelvic/Pap since she is currently on her cycle. Patient denies sexual activity.  Patient takes pantoprazole and cimetidine for GERD.  Patient has a history of iron deficiency anemia but does not take iron supplementation regularly.    Eye exam 2017. No previous Pap smear. Tdap February 2015.     GENERAL: Patient denies fever, chills, night sweats. Patient denies weight gain. Patient denies anorexia, fatigue, weakness or swollen glands.  SKIN: Patient denies rash or hair loss.  HEENT: Patient denies sore throat, ear pain, hearing loss, nasal congestion, or runny nose. Patient denies visual disturbance, eye irritation or discharge.  LUNGS: Patient denies cough, wheeze or hemoptysis.  CARDIOVASCULAR: Patient denies chest pain, shortness of breath, palpitations, syncope or lower extremity edema.  GI: Patient denies abdominal pain, nausea, vomiting, diarrhea, constipation, blood in stool or melena.  GENITOURINARY: Patient denies pelvic pain, vaginal discharge, itch or odor. Patient denies dysuria, frequency, hematuria, nocturia, urgency or incontinence.  BREASTS: Patient denies breast pain, mass or nipple discharge.  MUSCULOSKELETAL: Patient denies joint pain, swelling, redness or warmth.  NEUROLOGIC: Patient denies headache, vertigo, paresthesias, weakness in limb, dysarthria, dysphagia or abnormality of gait.  PSYCHIATRIC: Patient denies anxiety, depression, or memory loss.     OBJECTIVE:   GENERAL: Well-developed well-nourished morbidly obese, black female alert and oriented x3, in no acute  distress. Memory, judgment and cognition without deficit. Weight loss of 12 pounds in the last year.  SKIN: Clear without rash. Normal color and tone. Tattoo right volar wrist.  HEENT: Eyes: Clear conjunctivae. No scleral icterus. Pupils equal reactive to light and accommodation. Ears: Clear canals. Clear TMs. Nose: Without congestion. Pharynx: Without injection or exudates.  NECK: Supple, normal range of motion. No masses, lymphadenopathy or enlarged thyroid. No JVD. Carotids 2+ and equal. No bruits.  LUNGS: Clear to auscultation. Normal respiratory effort.  CARDIOVASCULAR: Regular rhythm, normal S1, S2 without murmur, gallop or rub.  BACK: No CVA or spinal tenderness.  BREASTS: No masses, tenderness or nipple discharge.  ABDOMEN: Obese. Active bowel sounds. Soft, nontender without mass or organomegaly. No rebound or guarding.  EXTREMITIES: Without cyanosis, clubbing or edema. Distal pulses 2+ and equal. Normal range of motion in all extremities. No joint effusion, erythema or warmth.  NEUROLOGIC: Cranial nerves II through XII without deficit. Motor strength equal bilaterally. Sensation normal to touch. Deep tendon reflexes 2+ and equal. Gait without abnormality. No tremor. Negative cerebellar signs.  PELVIC: Refused.     ASSESSMENT:  1. Preventative health care    2. Gastroesophageal reflux disease without esophagitis    3. Androgen-dependent hirsutism    4. Insulin resistance syndrome    5. Iron deficiency anemia, unspecified iron deficiency anemia type    6. Morbid obesity with BMI of 50.0-59.9, adult      PLAN:   1.  Weight reduction.  Exercise regularly.  2.  Age-appropriate counseling.  3.  Fasting lab.  4.  Increase spironolactone to 100 mg twice daily.  5.  Follow-up annually.   no

## 2022-11-03 ENCOUNTER — PATIENT MESSAGE (OUTPATIENT)
Dept: FAMILY MEDICINE | Facility: CLINIC | Age: 32
End: 2022-11-03
Payer: COMMERCIAL

## 2022-11-03 DIAGNOSIS — E66.01 MORBID OBESITY WITH BMI OF 50.0-59.9, ADULT: ICD-10-CM

## 2022-11-03 DIAGNOSIS — R73.03 PREDIABETES: ICD-10-CM

## 2022-11-03 DIAGNOSIS — E88.819 INSULIN RESISTANCE: ICD-10-CM

## 2022-11-03 RX ORDER — SEMAGLUTIDE 1.34 MG/ML
0.5 INJECTION, SOLUTION SUBCUTANEOUS
Qty: 1 PEN | Refills: 0 | Status: SHIPPED | OUTPATIENT
Start: 2022-11-03 | End: 2022-11-10

## 2022-11-03 NOTE — TELEPHONE ENCOUNTER
Care Due:                  Date            Visit Type   Department     Provider  --------------------------------------------------------------------------------    Last Visit: None Found      None         None Found  Next Visit: None Scheduled  None         None Found                                                            Last  Test          Frequency    Reason                     Performed    Due Date  --------------------------------------------------------------------------------    Office Visit  12 months..  semaglutide..............  Not Found    Overdue    [employer] Embedded Care Gaps. Reference number: [id number]. 11/03/2022   2:13:14 PM CDT

## 2022-11-10 ENCOUNTER — TELEPHONE (OUTPATIENT)
Dept: FAMILY MEDICINE | Facility: CLINIC | Age: 32
End: 2022-11-10
Payer: COMMERCIAL

## 2022-11-10 NOTE — TELEPHONE ENCOUNTER
I have signed for the following orders AND/OR meds.  Please call the patient and ask the patient to schedule the testing AND/OR inform about any medications that were sent. Medications have been sent to pharmacy listed below      No orders of the defined types were placed in this encounter.      Medications Ordered This Encounter   Medications    semaglutide (OZEMPIC) 1 mg/dose (4 mg/3 mL)     Sig: Inject 1 mg into the skin every 7 days.     Dispense:  1 pen     Refill:  1         [facility] [address] [place] [address] [address]  Phone: [phone] Fax: [phone]    [address] [address]  Phone: [phone] Fax: [phone]

## 2022-11-10 NOTE — TELEPHONE ENCOUNTER
----- Message from Andreea Dillon sent at 11/10/2022 12:56 PM CST -----  Contact: Olivia Hernandez is requesting that OZEMPIC dosage is increase to 1 MG lower dosage is out of stock at pharmacy. Pt sent message on portal in regards to medicine, please respond on my chart or call her back at [phone].            Thanks  DD

## 2022-12-24 ENCOUNTER — PATIENT MESSAGE (OUTPATIENT)
Dept: FAMILY MEDICINE | Facility: CLINIC | Age: 32
End: 2022-12-24
Payer: COMMERCIAL

## 2022-12-24 DIAGNOSIS — Z02.83 ENCOUNTER FOR DRUG SCREENING: Primary | ICD-10-CM

## 2022-12-27 ENCOUNTER — TELEPHONE (OUTPATIENT)
Dept: FAMILY MEDICINE | Facility: CLINIC | Age: 32
End: 2022-12-27
Payer: COMMERCIAL

## 2022-12-27 ENCOUNTER — PATIENT MESSAGE (OUTPATIENT)
Dept: FAMILY MEDICINE | Facility: CLINIC | Age: 32
End: 2022-12-27
Payer: COMMERCIAL

## 2022-12-27 DIAGNOSIS — Z02.83 ENCOUNTER FOR DRUG SCREENING: Primary | ICD-10-CM

## 2022-12-27 NOTE — TELEPHONE ENCOUNTER
Results should be back in a day or so.     I have signed for the following orders AND/OR meds.  Please call the patient and ask the patient to schedule the testing AND/OR inform about any medications that were sent. Medications have been sent to pharmacy listed below      Orders Placed This Encounter   Procedures    Toxicology screen, urine           Order Specific Question:   Specimen Source     Answer:   Urine              [facility] [address] [place] [address] 0790 [facility]  [address] [place] [address]  Phone: [phone] Fax: [phone]    [place] [address] [facility]  [address] [place] [address]  Phone: [phone] Fax: [phone]     Yes

## 2022-12-27 NOTE — TELEPHONE ENCOUNTER
----- Message from Natalee Linares sent at 12/27/2022 10:08 AM CST -----  Regarding: new orders  Patient drop off urine and need orders put in the system

## 2022-12-27 NOTE — TELEPHONE ENCOUNTER
I have signed for the following orders AND/OR meds.  Please call the patient and ask the patient to schedule the testing AND/OR inform about any medications that were sent. Medications have been sent to pharmacy listed below      Orders Placed This Encounter   Procedures    Toxicology Screen, Urine     Standing Status:   Future     Standing Expiration Date:   2/25/2024     Order Specific Question:   Specimen Source     Answer:   Urine              [facility] [address] [place] [address] 3000 [facility]  7298 [facility] [address]  Phone: [phone] Fax: [phone]    [address] [address]  Phone: [phone] Fax: [phone]

## 2023-01-03 DIAGNOSIS — E66.01 MORBID OBESITY WITH BMI OF 50.0-59.9, ADULT: ICD-10-CM

## 2023-01-03 DIAGNOSIS — E88.819 INSULIN RESISTANCE: ICD-10-CM

## 2023-01-03 DIAGNOSIS — R73.03 PREDIABETES: ICD-10-CM

## 2023-01-03 RX ORDER — SEMAGLUTIDE 1.34 MG/ML
INJECTION, SOLUTION SUBCUTANEOUS
Refills: 1 | OUTPATIENT
Start: 2023-01-03

## 2023-01-03 NOTE — TELEPHONE ENCOUNTER
No new care gaps identified.  [facility] Embedded Care Gaps. Reference number: [id number]. 1/03/2023   2:39:44 PM CST

## 2023-01-03 NOTE — TELEPHONE ENCOUNTER
Quick DC. Duplicate Request-  med pended in previous encounter, awaiting assessment    Refill Authorization Note   Rebecca Blankenship  is requesting a refill authorization.  Brief Assessment and Rationale for Refill:  Quick Discontinue  Medication Therapy Plan:       Medication Reconciliation Completed:  No      Comments:     Note composed:4:51 PM 01/03/2023

## 2023-01-11 ENCOUNTER — PATIENT MESSAGE (OUTPATIENT)
Dept: DERMATOLOGY | Facility: CLINIC | Age: 33
End: 2023-01-11
Payer: COMMERCIAL

## 2023-01-26 ENCOUNTER — PATIENT MESSAGE (OUTPATIENT)
Dept: FAMILY MEDICINE | Facility: CLINIC | Age: 33
End: 2023-01-26
Payer: COMMERCIAL

## 2023-01-26 DIAGNOSIS — R73.03 PREDIABETES: Primary | ICD-10-CM

## 2023-01-26 NOTE — TELEPHONE ENCOUNTER
No new care gaps identified.  [facility] Embedded Care Gaps. Reference number: [id number]. 1/26/2023   1:35:30 PM CST

## 2023-01-31 ENCOUNTER — PATIENT MESSAGE (OUTPATIENT)
Dept: FAMILY MEDICINE | Facility: CLINIC | Age: 33
End: 2023-01-31
Payer: COMMERCIAL

## 2023-01-31 RX ORDER — SEMAGLUTIDE 2.68 MG/ML
2 INJECTION, SOLUTION SUBCUTANEOUS
Qty: 1 PEN | Refills: 11 | Status: SHIPPED | OUTPATIENT
Start: 2023-01-31 | End: 2023-04-21 | Stop reason: ALTCHOICE

## 2023-02-03 ENCOUNTER — LAB VISIT (OUTPATIENT)
Dept: LAB | Facility: HOSPITAL | Age: 33
End: 2023-02-03
Attending: INTERNAL MEDICINE
Payer: COMMERCIAL

## 2023-02-03 DIAGNOSIS — Z00.00 ENCOUNTER FOR ANNUAL HEALTH EXAMINATION: ICD-10-CM

## 2023-02-03 PROCEDURE — 83036 HEMOGLOBIN GLYCOSYLATED A1C: CPT | Performed by: INTERNAL MEDICINE

## 2023-02-03 PROCEDURE — 36415 COLL VENOUS BLD VENIPUNCTURE: CPT | Mod: PO | Performed by: INTERNAL MEDICINE

## 2023-02-04 LAB
ESTIMATED AVG GLUCOSE: 105 MG/DL (ref 68–131)
HBA1C MFR BLD: 5.3 % (ref 4–5.6)

## 2023-02-06 NOTE — PROGRESS NOTES
Results have been released via [employer]. Please verify that these have been viewed by patient. If not, please call patient with results.    I have sent a message to them with the following interpretation (see below).    I have reviewed your recent results.    A1c is now in normal range.    Please do not hesitate to call or message with any additional questions or concerns.    Dionne Cole MD

## 2023-03-27 ENCOUNTER — PATIENT MESSAGE (OUTPATIENT)
Dept: FAMILY MEDICINE | Facility: CLINIC | Age: 33
End: 2023-03-27
Payer: COMMERCIAL

## 2023-03-29 ENCOUNTER — OFFICE VISIT (OUTPATIENT)
Dept: FAMILY MEDICINE | Facility: CLINIC | Age: 33
End: 2023-03-29
Payer: COMMERCIAL

## 2023-03-29 DIAGNOSIS — F41.9 ANXIETY: Primary | ICD-10-CM

## 2023-03-29 DIAGNOSIS — F51.01 PRIMARY INSOMNIA: ICD-10-CM

## 2023-03-29 PROCEDURE — 99214 PR OFFICE/OUTPT VISIT, EST, LEVL IV, 30-39 MIN: ICD-10-PCS | Mod: 95,,, | Performed by: PHYSICIAN ASSISTANT

## 2023-03-29 PROCEDURE — 1159F PR MEDICATION LIST DOCUMENTED IN MEDICAL RECORD: ICD-10-PCS | Mod: CPTII,95,, | Performed by: PHYSICIAN ASSISTANT

## 2023-03-29 PROCEDURE — 1160F RVW MEDS BY RX/DR IN RCRD: CPT | Mod: CPTII,95,, | Performed by: PHYSICIAN ASSISTANT

## 2023-03-29 PROCEDURE — 1159F MED LIST DOCD IN RCRD: CPT | Mod: CPTII,95,, | Performed by: PHYSICIAN ASSISTANT

## 2023-03-29 PROCEDURE — 3044F HG A1C LEVEL LT 7.0%: CPT | Mod: CPTII,95,, | Performed by: PHYSICIAN ASSISTANT

## 2023-03-29 PROCEDURE — 99214 OFFICE O/P EST MOD 30 MIN: CPT | Mod: 95,,, | Performed by: PHYSICIAN ASSISTANT

## 2023-03-29 PROCEDURE — 1160F PR REVIEW ALL MEDS BY PRESCRIBER/CLIN PHARMACIST DOCUMENTED: ICD-10-PCS | Mod: CPTII,95,, | Performed by: PHYSICIAN ASSISTANT

## 2023-03-29 PROCEDURE — 3044F PR MOST RECENT HEMOGLOBIN A1C LEVEL <7.0%: ICD-10-PCS | Mod: CPTII,95,, | Performed by: PHYSICIAN ASSISTANT

## 2023-03-29 RX ORDER — BUSPIRONE HYDROCHLORIDE 10 MG/1
10 TABLET ORAL 2 TIMES DAILY
Qty: 60 TABLET | Refills: 2 | Status: SHIPPED | OUTPATIENT
Start: 2023-03-29 | End: 2023-10-04

## 2023-03-29 RX ORDER — TRAZODONE HYDROCHLORIDE 50 MG/1
50 TABLET ORAL NIGHTLY
Qty: 30 TABLET | Refills: 2 | Status: SHIPPED | OUTPATIENT
Start: 2023-03-29 | End: 2023-10-04 | Stop reason: ALTCHOICE

## 2023-03-29 NOTE — PROGRESS NOTES
Primary Care Telemedicine Note  The patient location is: Patient Home   The chief complaint leading to consultation is: Anxiety  Total time spent with patient: 15 minutes     Visit type: Virtual visit with synchronous audio only and video  Each patient to whom he or she provides medical services by telemedicine is: (1) informed of the relationship between the physician and patient and the respective role of any other health care provider with respect to management of the patient; and (2) notified that he or she may decline to receive medical services by telemedicine and may withdraw from such care at any time.      Assessment/Plan:    Problem List Items Addressed This Visit          Psychiatric    Anxiety - Primary    Overview     -chronic, worsening over the past week  -has previously tried Zoloft and Vistaril which were ineffective  -trial of Buspar  -will consider SSRI/SNRI if symptoms persist  -discussed anxiety condition course  -discussed SSRI/SNRI as first-line treatment for this condition  -discussed risk of discontinuing this medication without tapering  -patient was educated, advised of side effects, and all questions were answered. Patient voiced understanding  -patient will follow up routinely and notify us if having any side effects or worsening or persistent symptoms. ER precautions were given.         Relevant Medications    busPIRone (BUSPAR) 10 MG tablet       Other    Primary insomnia    Overview     -chronic, likely due to anxiety  -start Trazodone 50 mg qhs  -has tried multiple OTC medication with minimal benefit  -discussed importance of good sleep hygiene practices           Relevant Medications    traZODone (DESYREL) 50 MG tablet       Follow up in about 1 month (around 4/29/2023), or if symptoms worsen or fail to improve.    Judy Navarro  PA-C  _____________________________________________________________________________________________________________________________________________________    CC: anxiety    HPI: Patient is an established patient who presents today via virtual visit for anxiety. Patient has a chronic history of anxiety with worsening of symptoms over the past week. Her symptoms include: nervous/anxious behavior, difficulty concentrating, racing thoughts, and insomnia. She reports increased stress at home which has contributed to her current symptoms. Denies SI/HI. She is not currently on medication for anxiety/depression. She has previously been on Zoloft and Vistaril which were both ineffective. She is also concerned for insomnia which she thinks could be related to her anxiety. She reports difficulty falling asleep and frequent nighttime awakenings. She has tried OTC medication with minimal benefit. She is requesting to try a medication for insomnia. No other complaints today.     Past Medical History:   Diagnosis Date    Gastroesophageal reflux disease 11/20/2015    Insulin resistance     Iron deficiency anemia     Morbid obesity with BMI of 50.0-59.9, adult     Oligomenorrhea     Oligomenorrhea      History reviewed. No pertinent surgical history.  Review of patient's allergies indicates:  No Known Allergies  Social History     Tobacco Use    Smoking status: Never    Smokeless tobacco: Never   Substance Use Topics    Alcohol use: Yes     Alcohol/week: 0.0 - 1.0 standard drinks     Comment: occ     Drug use: Not Currently     Frequency: 2.0 times per week     Types: Marijuana     Family History   Problem Relation Age of Onset    Colon cancer Paternal Grandfather     Cancer Paternal Grandfather         Colon    Breast cancer Maternal Aunt     Cancer Maternal Aunt         Breast    Stomach cancer Maternal Uncle     Stomach cancer Maternal Uncle     Drug abuse Maternal Uncle         Mostly all druga    Heart failure Maternal  Grandmother     Stroke Maternal Grandmother     Hypertension Maternal Grandmother     Kidney disease Maternal Grandmother     Heart disease Maternal Grandmother     Heart attack Maternal Uncle     Aortic aneurysm Maternal Aunt     Heart disease Maternal Aunt     Coronary artery disease Maternal Aunt         S/P stent    Coronary artery disease Maternal Uncle         S/P stent    Stroke Maternal Uncle     Hypertension Maternal Aunt     Hypertension Maternal Uncle     Fibroids Mother     Heart disease Maternal Uncle      Current Outpatient Medications on File Prior to Visit   Medication Sig Dispense Refill    ibuprofen (ADVIL,MOTRIN) 800 MG tablet Take 1 tablet (800 mg total) by mouth 3 (three) times daily as needed for Pain (with food). 90 tablet 0    ketoconazole (NIZORAL) 2 % cream Apply topically 2 (two) times daily. 60 g 5    ketoconazole (NIZORAL) 2 % shampoo Apply topically 3 (three) times a week. Leave on for 5-10 min before rinsing 120 mL 5    metFORMIN (GLUCOPHAGE) 500 MG tablet Take 1 tablet (500 mg total) by mouth 2 (two) times daily with meals. 180 tablet 1    RABEprazole (ACIPHEX) 20 mg tablet Take 1 tablet (20 mg total) by mouth once daily. 90 tablet 1    semaglutide (OZEMPIC) 2 mg/dose (8 mg/3 mL) PnIj Inject 2 mg into the skin every 7 days. 1 pen 11     No current facility-administered medications on file prior to visit.       Review of Systems   Constitutional:  Negative for chills, fever and malaise/fatigue.   HENT:  Negative for hearing loss.    Eyes:  Negative for discharge.   Respiratory:  Negative for cough, shortness of breath and wheezing.    Cardiovascular:  Negative for chest pain and leg swelling.   Gastrointestinal:  Negative for abdominal pain, blood in stool, constipation, diarrhea and vomiting.   Genitourinary:  Negative for dysuria, frequency and hematuria.   Musculoskeletal:  Negative for back pain, joint pain and neck pain.   Neurological:  Negative for weakness.    Endo/Heme/Allergies:  Negative for polydipsia.   Psychiatric/Behavioral:  Negative for depression. The patient is not nervous/anxious.        Physical Exam  Constitutional:       General: She is not in acute distress.     Appearance: She is well-developed.   HENT:      Head: Normocephalic and atraumatic.   Pulmonary:      Effort: Pulmonary effort is normal. No respiratory distress.   Abdominal:      General: There is no distension.   Musculoskeletal:         General: Normal range of motion.      Cervical back: Normal range of motion.   Neurological:      Mental Status: She is alert and oriented to person, place, and time.   Psychiatric:         Mood and Affect: Mood normal.         Behavior: Behavior normal.       The patient's Health Maintenance was reviewed and the following appears to be due at this time:  Health Maintenance Due   Topic Date Due    COVID-19 Vaccine (2 - Mixed Product series) 04/12/2021    Influenza Vaccine (1) 09/01/2022    Cervical Cancer Screening  03/10/2023

## 2023-04-17 ENCOUNTER — PATIENT MESSAGE (OUTPATIENT)
Dept: FAMILY MEDICINE | Facility: CLINIC | Age: 33
End: 2023-04-17
Payer: COMMERCIAL

## 2023-04-17 DIAGNOSIS — R73.03 PREDIABETES: ICD-10-CM

## 2023-04-17 DIAGNOSIS — E66.01 MORBID OBESITY WITH BMI OF 50.0-59.9, ADULT: Primary | ICD-10-CM

## 2023-04-21 RX ORDER — TIRZEPATIDE 5 MG/.5ML
5 INJECTION, SOLUTION SUBCUTANEOUS
Qty: 4 PEN | Refills: 1 | Status: SHIPPED | OUTPATIENT
Start: 2023-04-21 | End: 2023-06-13

## 2023-04-21 NOTE — TELEPHONE ENCOUNTER
I have signed for the following orders AND/OR meds.  Please call the patient and ask the patient to schedule the testing AND/OR inform about any medications that were sent. Medications have been sent to pharmacy listed below      No orders of the defined types were placed in this encounter.      Medications Ordered This Encounter   Medications    tirzepatide (MOUNJARO) 5 mg/0.5 mL PnIj     Sig: Inject 5 mg into the skin every 7 days.     Dispense:  4 pen     Refill:  1         [facility] [address] [facility] [address] [place] [address] [facility] [address]  Phone: [phone] Fax: [phone]    [address] [place] [address]  [address] [place] [address]  Phone: [phone] Fax: [phone]    [facility] 1812 [facility]  1812 [facility] [address]  Phone: [phone] Fax: [phone]

## 2023-06-12 ENCOUNTER — PATIENT MESSAGE (OUTPATIENT)
Dept: FAMILY MEDICINE | Facility: CLINIC | Age: 33
End: 2023-06-12
Payer: COMMERCIAL

## 2023-06-12 DIAGNOSIS — R73.03 PREDIABETES: Primary | ICD-10-CM

## 2023-06-12 DIAGNOSIS — E66.01 MORBID OBESITY WITH BMI OF 50.0-59.9, ADULT: ICD-10-CM

## 2023-06-12 RX ORDER — TIRZEPATIDE 5 MG/.5ML
5 INJECTION, SOLUTION SUBCUTANEOUS
Qty: 4 PEN | Refills: 1 | Status: CANCELLED | OUTPATIENT
Start: 2023-06-12

## 2023-06-13 ENCOUNTER — PATIENT MESSAGE (OUTPATIENT)
Dept: FAMILY MEDICINE | Facility: CLINIC | Age: 33
End: 2023-06-13
Payer: COMMERCIAL

## 2023-06-13 DIAGNOSIS — F41.9 ANXIETY: Primary | ICD-10-CM

## 2023-06-13 RX ORDER — ESCITALOPRAM OXALATE 10 MG/1
10 TABLET ORAL DAILY
Qty: 30 TABLET | Refills: 2 | Status: SHIPPED | OUTPATIENT
Start: 2023-06-13 | End: 2023-10-04

## 2023-06-13 NOTE — TELEPHONE ENCOUNTER
Please see if the patient is wanting to try a different medication for anxiety. We discussed possibly starting an SSRI/SNRI.

## 2023-06-13 NOTE — TELEPHONE ENCOUNTER
I have signed for the following orders AND/OR meds.  Please call the patient and ask the patient to schedule the testing AND/OR inform about any medications that were sent. Medications have been sent to pharmacy listed below      No orders of the defined types were placed in this encounter.      Medications Ordered This Encounter   Medications    tirzepatide 7.5 mg/0.5 mL PnIj     Sig: Inject 7.5 mg into the skin every 7 days.     Dispense:  4 pen     Refill:  2         [place] [id number] [facility] [address] [place] [address] [facility] [address]  Phone: [phone] Fax: [phone]    [address] [place] [address]  [address] [place] [address]  Phone: [phone] Fax: [phone]    [facility] 1812 [facility]  1812 [facility] [address]  Phone: [phone] Fax: [phone]

## 2023-06-13 NOTE — TELEPHONE ENCOUNTER
She can start Lexapro 10 mg once daily for anxiety. Please have her follow up with me if symptoms worsen or do not improve.    I have signed for the following orders AND/OR meds.  Please call the patient and ask the patient to schedule the testing AND/OR inform about any medications that were sent. Medications have been sent to pharmacy listed below      No orders of the defined types were placed in this encounter.      Medications Ordered This Encounter   Medications    EScitalopram oxalate (LEXAPRO) 10 MG tablet     Sig: Take 1 tablet (10 mg total) by mouth once daily.     Dispense:  30 tablet     Refill:  2         [facility] [address] [place] [address] [address] [place] [address] [facility] [address]  Phone: [phone] Fax: [phone]    [address] 9869 [facility]  [address] [place] [address]  Phone: [phone] Fax: [phone]    [facility] [address] [place] [address] [facility] [address]  Phone: [phone] Fax: [phone]

## 2023-07-31 ENCOUNTER — PATIENT MESSAGE (OUTPATIENT)
Dept: FAMILY MEDICINE | Facility: CLINIC | Age: 33
End: 2023-07-31

## 2023-07-31 ENCOUNTER — E-VISIT (OUTPATIENT)
Dept: FAMILY MEDICINE | Facility: CLINIC | Age: 33
End: 2023-07-31
Payer: COMMERCIAL

## 2023-07-31 DIAGNOSIS — B37.9 YEAST INFECTION: Primary | ICD-10-CM

## 2023-07-31 PROCEDURE — 99421 OL DIG E/M SVC 5-10 MIN: CPT | Mod: ,,, | Performed by: NURSE PRACTITIONER

## 2023-07-31 PROCEDURE — 99421 PR E&M, ONLINE DIGIT, EST, < 7 DAYS, 5-10 MINS: ICD-10-PCS | Mod: ,,, | Performed by: NURSE PRACTITIONER

## 2023-07-31 RX ORDER — FLUCONAZOLE 150 MG/1
150 TABLET ORAL
Qty: 2 TABLET | Refills: 0 | Status: SHIPPED | OUTPATIENT
Start: 2023-07-31 | End: 2023-08-04

## 2023-07-31 NOTE — PROGRESS NOTES
Patient ID: Rebecca Blankenship is a 32 y.o. female.    Chief Complaint: Vaginitis    The patient initiated a request through [employer] on 7/31/2023 for evaluation and management with a chief complaint of Vaginitis     I evaluated the questionnaire submission on 07/31/2023.    [facility] Peq Evisit Uti Questionnaire    7/31/2023  8:59 AM CDT - Filed by Patient   Do you agree to participate in an E-Visit? Yes   If you have any of the following problems, please present to your local ER or call 911:  I acknowledge   What is the main issue that you would like for your doctor to address today? Burning itching   Are you able to take your vital signs? No   Are you currently pregnant, could you be pregnant, or are you breast feeding? None of the above   What symptoms do you currently have? Pain while passing urine   When did your symptoms first appear? 7/28/2023   List what you have done or taken to help your symptoms. Nothing   Please indicate whether you have had the following symptoms during the past 24 hours     Urgent urination (a sudden and uncontrollable urge to urinate) Mild   Frequent urination of small amounts of urine (going to the toilet very often) Mild   Burning pain when urinating None   Incomplete bladder emptying (still feel like you need to urinate again after urination) None   Pain not associated with urination in the lower abdomen below the belly button) None   What does your urine look like? Clear   Blood seen in the urine None   Flank pain (pain in one or both sides of the lower back) Mild   Abnormal Vaginal Discharge (abnormal amount, color and/or odor) Mild   Discharge from the urethra (urinary opening) without urination None   High body temperature/fever? None-<99.5   Please rate how much discomfort you have experience because of the symptoms in the past 24 hours: Severe   Please indicate how the symptoms have interfered with your every day activities/work in the past 24 hours: Mild   Please indicate  how these symptoms have interfered with your social activities (visiting people, meeting with friends, etc.) in the past 24 hours? Mild   Are you a diabetic? No   Please indicate whether you have the following at the time of completion of this questionnaire: Premenstrual syndrome (PMS)   Provide any information you feel is important to your history not asked above    Please attach any relevant images or files (if you have performed a home test for UTI, please submit a photo of results)          Recent Labs Obtained:  No visits with results within 7 Day(s) from this visit.   Latest known visit with results is:   Lab Visit on 02/03/2023   Component Date Value Ref Range Status    Hemoglobin A1C 02/03/2023 5.3  4.0 - 5.6 % Final    Comment: ADA Screening Guidelines:  5.7-6.4%  Consistent with prediabetes  >or=6.5%  Consistent with diabetes    High levels of fetal hemoglobin interfere with the HbA1C  assay. Heterozygous hemoglobin variants (HbS, HgC, etc)do  not significantly interfere with this assay.   However, presence of multiple variants may affect accuracy.      Estimated Avg Glucose 02/03/2023 105  68 - 131 mg/dL Final       No diagnosis found.     No orders of the defined types were placed in this encounter.     Medications Ordered This Encounter   Medications    fluconazole (DIFLUCAN) 150 MG Tab     Sig: Take 1 tablet (150 mg total) by mouth every 72 hours. for 2 doses     Dispense:  2 tablet     Refill:  0        Follow up if symptoms worsen or fail to improve.      E-Visit Time Tracking:    Day 1 Time (in minutes): 5     Total Time (in minutes): 5

## 2023-08-09 ENCOUNTER — PATIENT MESSAGE (OUTPATIENT)
Dept: FAMILY MEDICINE | Facility: CLINIC | Age: 33
End: 2023-08-09
Payer: COMMERCIAL

## 2023-08-09 DIAGNOSIS — E66.01 MORBID OBESITY WITH BMI OF 50.0-59.9, ADULT: Primary | ICD-10-CM

## 2023-08-09 DIAGNOSIS — R73.03 PREDIABETES: ICD-10-CM

## 2023-08-10 NOTE — TELEPHONE ENCOUNTER
I have signed for the following orders AND/OR meds.  Please call the patient and ask the patient to schedule the testing AND/OR inform about any medications that were sent. Medications have been sent to pharmacy listed below      No orders of the defined types were placed in this encounter.      Medications Ordered This Encounter   Medications    tirzepatide 10 mg/0.5 mL PnIj     Sig: Inject 10 mg into the skin every 7 days.     Dispense:  4 pen      Refill:  5         [facility] [place] - [address] [address]  Phone: [phone] Fax: [phone]    [address] [place] [address]  Phone: [phone] Fax: [phone]    [facility] [place] - [address] [place] [address] [place] [address]  Phone: [phone] Fax: [phone]

## 2023-08-15 ENCOUNTER — PATIENT MESSAGE (OUTPATIENT)
Dept: FAMILY MEDICINE | Facility: CLINIC | Age: 33
End: 2023-08-15
Payer: COMMERCIAL

## 2023-08-15 DIAGNOSIS — Z11.3 ROUTINE SCREENING FOR STI (SEXUALLY TRANSMITTED INFECTION): Primary | ICD-10-CM

## 2023-08-16 NOTE — TELEPHONE ENCOUNTER
I have signed for the following orders AND/OR meds.  Please call the patient and ask the patient to schedule the testing AND/OR inform about any medications that were sent. Medications have been sent to pharmacy listed below      Orders Placed This Encounter   Procedures    C. trachomatis/N. gonorrhoeae by AMP DNA [facility]; Urine     Order Specific Question:   Sources by Resulting Lab:     Answer:   [facility]     Order Specific Question:   Source:     Answer:   Urine     Order Specific Question:   Release to patient     Answer:   Immediate    HIV 1/2 Ag/Ab (4th Gen)     Standing Status:   Future     Standing Expiration Date:   10/14/2024     Order Specific Question:   Release to patient     Answer:   Immediate    RPR     Standing Status:   Future     Standing Expiration Date:   10/14/2024     Order Specific Question:   Release to patient     Answer:   Immediate              [facility] [place] - [address] [facility]  [address] [place] [address]  Phone: [phone] Fax: [phone]    [address] [place] [address]  [address] [place] [address]  Phone: [phone] Fax: [phone]    [facility] [place] - 1812 [facility]  [address] [place] [address]  Phone: [phone] Fax: [phone]

## 2023-08-17 ENCOUNTER — LAB VISIT (OUTPATIENT)
Dept: LAB | Facility: HOSPITAL | Age: 33
End: 2023-08-17
Attending: INTERNAL MEDICINE
Payer: COMMERCIAL

## 2023-08-17 DIAGNOSIS — Z11.3 ROUTINE SCREENING FOR STI (SEXUALLY TRANSMITTED INFECTION): ICD-10-CM

## 2023-08-17 PROCEDURE — 36415 COLL VENOUS BLD VENIPUNCTURE: CPT | Mod: PO | Performed by: INTERNAL MEDICINE

## 2023-08-17 PROCEDURE — 86592 SYPHILIS TEST NON-TREP QUAL: CPT | Performed by: INTERNAL MEDICINE

## 2023-08-17 PROCEDURE — 87389 HIV-1 AG W/HIV-1&-2 AB AG IA: CPT | Performed by: INTERNAL MEDICINE

## 2023-08-18 LAB
HIV 1+2 AB+HIV1 P24 AG SERPL QL IA: NORMAL
RPR SER QL: NORMAL

## 2023-08-18 NOTE — PROGRESS NOTES
Results have been released via [employer]. Please verify that these have been viewed by patient. If not, please call patient with results.    Please schedule the following orders:  I do not see urine G/C is pending. Please follow up if patient still wanted this checked.      I have reviewed your recent results.    HIV and syphilis screening is negative.     Please let me know if you have any additional questions or concerns about above.

## 2023-08-29 ENCOUNTER — TELEPHONE (OUTPATIENT)
Dept: FAMILY MEDICINE | Facility: CLINIC | Age: 33
End: 2023-08-29

## 2023-08-29 ENCOUNTER — OFFICE VISIT (OUTPATIENT)
Dept: FAMILY MEDICINE | Facility: CLINIC | Age: 33
End: 2023-08-29
Payer: COMMERCIAL

## 2023-08-29 VITALS
HEART RATE: 98 BPM | WEIGHT: 268.88 LBS | TEMPERATURE: 98 F | OXYGEN SATURATION: 99 % | SYSTOLIC BLOOD PRESSURE: 139 MMHG | BODY MASS INDEX: 54.31 KG/M2 | DIASTOLIC BLOOD PRESSURE: 78 MMHG

## 2023-08-29 DIAGNOSIS — U07.1 COVID-19: Primary | ICD-10-CM

## 2023-08-29 DIAGNOSIS — L84 SKIN CALLUS: ICD-10-CM

## 2023-08-29 DIAGNOSIS — R05.9 COUGH, UNSPECIFIED TYPE: ICD-10-CM

## 2023-08-29 DIAGNOSIS — R68.83 CHILLS: ICD-10-CM

## 2023-08-29 LAB
CTP QC/QA: YES
CTP QC/QA: YES
POC MOLECULAR INFLUENZA A AGN: NEGATIVE
POC MOLECULAR INFLUENZA B AGN: NEGATIVE
SARS-COV-2 RDRP RESP QL NAA+PROBE: POSITIVE

## 2023-08-29 PROCEDURE — 99213 PR OFFICE/OUTPT VISIT, EST, LEVL III, 20-29 MIN: ICD-10-PCS | Mod: S$GLB,,, | Performed by: NURSE PRACTITIONER

## 2023-08-29 PROCEDURE — 1159F PR MEDICATION LIST DOCUMENTED IN MEDICAL RECORD: ICD-10-PCS | Mod: CPTII,S$GLB,, | Performed by: NURSE PRACTITIONER

## 2023-08-29 PROCEDURE — 3078F PR MOST RECENT DIASTOLIC BLOOD PRESSURE < 80 MM HG: ICD-10-PCS | Mod: CPTII,S$GLB,, | Performed by: NURSE PRACTITIONER

## 2023-08-29 PROCEDURE — 87635: ICD-10-PCS | Mod: QW,S$GLB,, | Performed by: NURSE PRACTITIONER

## 2023-08-29 PROCEDURE — 99999 PR PBB SHADOW E&M-EST. PATIENT-LVL V: CPT | Mod: PBBFAC,,, | Performed by: NURSE PRACTITIONER

## 2023-08-29 PROCEDURE — 1160F PR REVIEW ALL MEDS BY PRESCRIBER/CLIN PHARMACIST DOCUMENTED: ICD-10-PCS | Mod: CPTII,S$GLB,, | Performed by: NURSE PRACTITIONER

## 2023-08-29 PROCEDURE — 3075F SYST BP GE 130 - 139MM HG: CPT | Mod: CPTII,S$GLB,, | Performed by: NURSE PRACTITIONER

## 2023-08-29 PROCEDURE — 3008F BODY MASS INDEX DOCD: CPT | Mod: CPTII,S$GLB,, | Performed by: NURSE PRACTITIONER

## 2023-08-29 PROCEDURE — 99213 OFFICE O/P EST LOW 20 MIN: CPT | Mod: S$GLB,,, | Performed by: NURSE PRACTITIONER

## 2023-08-29 PROCEDURE — 87502 POCT INFLUENZA A/B MOLECULAR: ICD-10-PCS | Mod: QW,S$GLB,, | Performed by: NURSE PRACTITIONER

## 2023-08-29 PROCEDURE — 1159F MED LIST DOCD IN RCRD: CPT | Mod: CPTII,S$GLB,, | Performed by: NURSE PRACTITIONER

## 2023-08-29 PROCEDURE — 3078F DIAST BP <80 MM HG: CPT | Mod: CPTII,S$GLB,, | Performed by: NURSE PRACTITIONER

## 2023-08-29 PROCEDURE — 99999 PR PBB SHADOW E&M-EST. PATIENT-LVL V: ICD-10-PCS | Mod: PBBFAC,,, | Performed by: NURSE PRACTITIONER

## 2023-08-29 PROCEDURE — 3075F PR MOST RECENT SYSTOLIC BLOOD PRESS GE 130-139MM HG: ICD-10-PCS | Mod: CPTII,S$GLB,, | Performed by: NURSE PRACTITIONER

## 2023-08-29 PROCEDURE — 87502 INFLUENZA DNA AMP PROBE: CPT | Mod: QW,S$GLB,, | Performed by: NURSE PRACTITIONER

## 2023-08-29 PROCEDURE — 3008F PR BODY MASS INDEX (BMI) DOCUMENTED: ICD-10-PCS | Mod: CPTII,S$GLB,, | Performed by: NURSE PRACTITIONER

## 2023-08-29 PROCEDURE — 1160F RVW MEDS BY RX/DR IN RCRD: CPT | Mod: CPTII,S$GLB,, | Performed by: NURSE PRACTITIONER

## 2023-08-29 PROCEDURE — 87635 SARS-COV-2 COVID-19 AMP PRB: CPT | Mod: QW,S$GLB,, | Performed by: NURSE PRACTITIONER

## 2023-08-29 PROCEDURE — 3044F HG A1C LEVEL LT 7.0%: CPT | Mod: CPTII,S$GLB,, | Performed by: NURSE PRACTITIONER

## 2023-08-29 PROCEDURE — 3044F PR MOST RECENT HEMOGLOBIN A1C LEVEL <7.0%: ICD-10-PCS | Mod: CPTII,S$GLB,, | Performed by: NURSE PRACTITIONER

## 2023-08-29 RX ORDER — ALBUTEROL SULFATE 90 UG/1
2 AEROSOL, METERED RESPIRATORY (INHALATION) EVERY 6 HOURS PRN
Qty: 18 G | Refills: 0 | Status: SHIPPED | OUTPATIENT
Start: 2023-08-29 | End: 2023-10-04

## 2023-08-29 RX ORDER — BENZONATATE 200 MG/1
200 CAPSULE ORAL 3 TIMES DAILY PRN
Qty: 30 CAPSULE | Refills: 0 | Status: SHIPPED | OUTPATIENT
Start: 2023-08-29 | End: 2023-09-08

## 2023-08-29 NOTE — TELEPHONE ENCOUNTER
----- Message from Cathi Guzman DNP sent at 8/29/2023 12:24 PM CDT -----  Contact: [facility]  Ok. She can just get chloraseptic OTC.   ----- Message -----  From: Apryl Luu LPN  Sent: 8/29/2023  11:33 AM CDT  To: Cathi Guzman DNP    Please advise  ----- Message -----  From: Holli Lemon  Sent: 8/29/2023  10:48 AM CDT  To: Thomsa Arteaga with [facility] is calling to inform provider that they are out of one of the ingredients for the magic mouth wash . .

## 2023-08-29 NOTE — PATIENT INSTRUCTIONS
"Hydrate well  Rest  Tylenol OTC as directed  Inhaler can cause jitteriness, palpitations  Report to ER immediately if symptoms worsen or persist    Instructions for Patients with Confirmed or Suspected COVID-19    If you are awaiting your test result, you will either be called or it will be released to the patient portal.  If you have any questions about your test, please visit [email or web address] or call our COVID-19 information line at [phone].      Please isolate yourself at home.  You may leave home and/or return to work once the following conditions are met:    If you have symptoms and tested positive:  More than 5 days since symptoms first appeared AND  More than 24 hours fever free without medications AND       symptoms have improved   For five days after ending isolation, masks are required.    If you had no symptoms but tested positive:  More than 5 days since the date of the first positive test. If you develop symptoms, then use the guidelines above  For five days after ending isolation, masks are required.      Testing is not recommended if you are symptom free after completing isolation.     Molnupiravir ([place]: Authorized for use): Patient drug information  Access [employer] for additional drug information, tools, and databases.  Copyright 1770-2434 [employer] All rights reserved.  Contributor Disclosures  (For additional information see "Molnupiravir ([place]: Authorized for use): Drug information" and see "Molnupiravir ([place]: Authorized for use): Pediatric drug information")    You must carefully read the "Consumer Information Use and Disclaimer" below in order to understand and correctly use this information.  Brand Names:   Lagevrio    Warning  This drug may cause harm to an unborn baby. A pregnancy test will be done before you start this drug to show that you are NOT pregnant.  If you or your sex partner may become pregnant, you must use birth " control while taking this drug and for some time after the last dose. Ask your doctor how long to use birth control. If you or your sex partner gets pregnant, call your doctor right away.    What is this drug used for?  It is used in certain people to treat COVID-19.    What do I need to tell my doctor BEFORE I take this drug?  If you are allergic to this drug; any part of this drug; or any other drugs, foods, or substances. Tell your doctor about the allergy and what signs you had.  If you are breast-feeding. Do not breast-feed for at least 4 days after using this drug.  This drug may interact with other drugs or health problems.  Tell your doctor and pharmacist about all of your drugs (prescription or OTC, natural products, vitamins) and health problems. You must check to make sure that it is safe for you to take this drug with all of your drugs and health problems. Do not start, stop, or change the dose of any drug without checking with your doctor.    What are some things I need to know or do while I take this drug?  Tell all of your health care providers that you take this drug. This includes your doctors, nurses, pharmacists, and dentists.  Do not take this drug for longer than you were told by your doctor.  After getting this drug, you must continue to isolate and do other things to control infection. Wear a mask, social distance, do not share personal items, clean and disinfect high touch surfaces, and wash hands often as you have been told by your doctor.  This drug is not for use in children younger than 18 years of age. If your child has been given this drug or if you have any questions, talk with the doctor.    What are some side effects that I need to call my doctor about right away?  WARNING/CAUTION: Even though it may be rare, some people may have very bad and sometimes deadly side effects when taking a drug. Tell your doctor or get medical help right away if you have any of the following signs or  symptoms that may be related to a very bad side effect:  Signs of an allergic reaction, like rash; hives; itching; red, swollen, blistered, or peeling skin with or without fever; wheezing; tightness in the chest or throat; trouble breathing, swallowing, or talking; unusual hoarseness; or swelling of the mouth, face, lips, tongue, or throat.  A fast heartbeat.    What are some other side effects of this drug?  All drugs may cause side effects. However, many people have no side effects or only have minor side effects. Call your doctor or get medical help if any of these side effects or any other side effects bother you or do not go away:  Diarrhea.  Upset stomach.  Dizziness.  These are not all of the side effects that may occur. If you have questions about side effects, call your doctor. Call your doctor for medical advice about side effects.  You may report side effects to your national health agency.    How is this drug best taken?  Use this drug as ordered by your doctor. Read all information given to you. Follow all instructions closely.  Take with or without food.  Swallow whole. Do not chew, open, or crush.  It is important that you do not miss or skip a dose of this drug during treatment.  Use as you have been told, even if your signs get better.  If you have trouble swallowing, talk with your doctor.  Those who have feeding tubes may use this drug. Use as you have been told. Flush the feeding tube after this drug is given.    What do I do if I miss a dose?  Take a missed dose as soon as you think about it.  If it has been 10 hours or more since the missed dose, skip the missed dose and go back to your normal time.  Do not take 2 doses at the same time or extra doses.  If you are not sure what to do if you miss a dose, call your doctor.    How do I store and/or throw out this drug?  Store at room temperature in a dry place. Do not store in a bathroom.  Keep all drugs in a safe place. Keep all drugs out of the  reach of children and pets.  Throw away unused or  drugs. Do not flush down a toilet or pour down a drain unless you are told to do so. Check with your pharmacist if you have questions about the best way to throw out drugs. There may be drug take-back programs in your area.    General drug facts  If your symptoms or health problems do not get better or if they become worse, call your doctor.  Do not share your drugs with others and do not take anyone else's drugs.  Some drugs may have another patient information leaflet. If you have any questions about this drug, please talk with your doctor, nurse, pharmacist, or other health care provider.  If you think there has been an overdose, call your poison control center or get medical care right away. Be ready to tell or show what was taken, how much, and when it happened.

## 2023-08-29 NOTE — PROGRESS NOTES
Subjective     Patient ID: Rebecca Blankenship is a 33 y.o. female.    Chief Complaint: Cough, Fatigue, and Sinus Problem    Cough  This is a new problem. The current episode started yesterday. The problem has been unchanged. The cough is Productive of sputum. Associated symptoms include chills, headaches, myalgias, nasal congestion, rhinorrhea and a sore throat. Pertinent negatives include no chest pain, ear congestion, ear pain, fever, heartburn, hemoptysis, postnasal drip, rash, shortness of breath, sweats, weight loss or wheezing. Nothing aggravates the symptoms. She has tried nothing for the symptoms. The treatment provided no relief. There is no history of asthma, bronchiectasis, bronchitis, COPD, emphysema, environmental allergies or pneumonia.   Pt also states has callus to thumb of left hand x 1 m; denies pain, drainage; requests referral. Pt has no other complaints today.  Past Medical History:   Diagnosis Date    Gastroesophageal reflux disease 11/20/2015    Insulin resistance     Iron deficiency anemia     Morbid obesity with BMI of 50.0-59.9, adult     Oligomenorrhea     Oligomenorrhea      Social History     Socioeconomic History    Marital status:     Number of children: 0   Occupational History     Employer: [employer]   Tobacco Use    Smoking status: Never    Smokeless tobacco: Never   Substance and Sexual Activity    Alcohol use: Yes     Alcohol/week: 0.0 - 1.0 standard drinks of alcohol     Comment: occ     Drug use: Not Currently     Frequency: 2.0 times per week     Types: Marijuana    Sexual activity: Yes     Partners: Male   Social History Narrative    The patient is single and has no children.  She lives alone.  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     Social Determinants of Health     Financial Resource Strain: Low Risk  (8/29/2023)    Overall Financial Resource Strain (CARDIA)     Difficulty of Paying Living Expenses: Not  very hard   Food Insecurity: No Food Insecurity (8/29/2023)    Hunger Vital Sign     Worried About Running Out of Food in the Last Year: Never true     Ran Out of Food in the Last Year: Never true   Transportation Needs: No Transportation Needs (8/29/2023)    PRAPARE - Transportation     Lack of Transportation (Medical): No     Lack of Transportation (Non-Medical): No   Physical Activity: Insufficiently Active (8/29/2023)    Exercise Vital Sign     Days of Exercise per Week: 1 day     Minutes of Exercise per Session: 30 min   Stress: No Stress Concern Present (8/29/2023)    [place] [employer] - Occupational Stress Questionnaire     Feeling of Stress : Only a little   Social Connections: Unknown (8/29/2023)    Social Connection and Isolation Panel [NHANES]     Frequency of Communication with Friends and Family: More than three times a week     Frequency of Social Gatherings with Friends and Family: Twice a week     Active Member of Clubs or Organizations: No     Attends Club or Organization Meetings: Never     Marital Status:    Housing Stability: Low Risk  (8/29/2023)    Housing Stability Vital Sign     Unable to Pay for Housing in the Last Year: No     Number of Places Lived in the Last Year: 1     Unstable Housing in the Last Year: No     History reviewed. No pertinent surgical history.    Review of Systems   Constitutional:  Positive for chills. Negative for fever and weight loss.   HENT:  Positive for rhinorrhea, sinus pressure/congestion and sore throat. Negative for ear pain and postnasal drip.    Eyes: Negative.    Respiratory:  Positive for cough. Negative for hemoptysis, shortness of breath and wheezing.    Cardiovascular: Negative.  Negative for chest pain.   Gastrointestinal: Negative.  Negative for heartburn.   Endocrine: Negative.    Genitourinary: Negative.    Musculoskeletal:  Positive for myalgias.   Integumentary:  Negative for rash. Negative.   Allergic/Immunologic:  Negative.  Negative for environmental allergies.   Neurological:  Positive for headaches.   Psychiatric/Behavioral: Negative.            Objective     Physical Exam  Vitals and nursing note reviewed.   Constitutional:       Appearance: Normal appearance.   HENT:      Head: Normocephalic.      Right Ear: Tympanic membrane, ear canal and external ear normal.      Left Ear: Tympanic membrane, ear canal and external ear normal.      Nose: Congestion and rhinorrhea present.      Mouth/Throat:      Mouth: Mucous membranes are moist.      Pharynx: Oropharynx is clear.   Eyes:      Conjunctiva/sclera: Conjunctivae normal.      Pupils: Pupils are equal, round, and reactive to light.   Cardiovascular:      Rate and Rhythm: Normal rate and regular rhythm.      Pulses: Normal pulses.      Heart sounds: Normal heart sounds.   Pulmonary:      Effort: Pulmonary effort is normal.      Breath sounds: Normal breath sounds.   Abdominal:      General: Bowel sounds are normal.      Palpations: Abdomen is soft.   Musculoskeletal:         General: Normal range of motion.      Cervical back: Normal range of motion and neck supple.   Skin:     General: Skin is warm and dry.      Capillary Refill: Capillary refill takes 2 to 3 seconds.   Neurological:      Mental Status: She is alert and oriented to person, place, and time.   Psychiatric:         Mood and Affect: Mood normal.         Behavior: Behavior normal.         Thought Content: Thought content normal.         Judgment: Judgment normal.            Assessment and Plan     1. COVID-19  2. Cough, unspecified type  3. Chills  -     POCT COVID-19 Rapid Screening  -     POCT Influenza A/B Molecular  -     benzonatate (TESSALON) 200 MG capsule; Take 1 capsule (200 mg total) by mouth 3 (three) times daily as needed.  Dispense: 30 capsule; Refill: 0  -     (Magic mouthwash) 1:1:1 diphenhydrAMINE(Benadryl) 12.5mg/5ml liq, aluminum & magnesium hydroxide-simethicone (Maalox), LIDOcaine viscous 2%;  Swish and spit 5 mLs every 8 (eight) hours as needed. Gargle and spit. DO NOT SWALLOW  Dispense: 360 mL; Refill: 0  -     albuterol (PROVENTIL/VENTOLIN HFA) 90 mcg/actuation inhaler; Inhale 2 puffs into the lungs every 6 (six) hours as needed for Wheezing or Shortness of Breath.  Dispense: 18 g; Refill: 0  -     molnupiravir 200 mg capsule (EUA); Take 4 capsules (800 mg total) by mouth every 12 (twelve) hours. for 5 days  Dispense: 40 capsule; Refill: 0  Handout r/t molnupiravir uses, potential side effects/interactions given  COVID-19 home instructions given  Hydrate well  Rest  Tylenol OTC as directed  Report to ER immediately if symptoms worsen or persist    4. Skin callus  -     Ambulatory referral/consult to Dermatology; Future; Expected date: 09/05/2023                 No follow-ups on file.

## 2023-08-31 ENCOUNTER — PATIENT MESSAGE (OUTPATIENT)
Dept: FAMILY MEDICINE | Facility: CLINIC | Age: 33
End: 2023-08-31
Payer: COMMERCIAL

## 2023-10-02 DIAGNOSIS — K21.9 GASTROESOPHAGEAL REFLUX DISEASE WITHOUT ESOPHAGITIS: ICD-10-CM

## 2023-10-02 RX ORDER — RABEPRAZOLE SODIUM 20 MG/1
20 TABLET, DELAYED RELEASE ORAL DAILY
Qty: 30 TABLET | Refills: 0 | Status: SHIPPED | OUTPATIENT
Start: 2023-10-02 | End: 2023-10-04 | Stop reason: SDUPTHER

## 2023-10-02 NOTE — TELEPHONE ENCOUNTER
Refill Routing Note   Medication(s) are not appropriate for processing by [facility] for the following reason(s):      Patient not seen by provider within 15 months    [facility] action(s):  Defer Care Due:  None identified              Appointments  past 12m or future 3m with PCP    Date Provider   Last Visit   10/23/2020 Dionne Cole MD   Next Visit   10/4/2023 Dionne Cole MD   ED visits in past 90 days: 0        Note composed:12:27 PM 10/02/2023

## 2023-10-04 ENCOUNTER — PATIENT MESSAGE (OUTPATIENT)
Dept: FAMILY MEDICINE | Facility: CLINIC | Age: 33
End: 2023-10-04

## 2023-10-04 ENCOUNTER — LAB VISIT (OUTPATIENT)
Dept: LAB | Facility: HOSPITAL | Age: 33
End: 2023-10-04
Attending: INTERNAL MEDICINE
Payer: COMMERCIAL

## 2023-10-04 ENCOUNTER — OFFICE VISIT (OUTPATIENT)
Dept: FAMILY MEDICINE | Facility: CLINIC | Age: 33
End: 2023-10-04
Payer: COMMERCIAL

## 2023-10-04 VITALS
DIASTOLIC BLOOD PRESSURE: 86 MMHG | SYSTOLIC BLOOD PRESSURE: 139 MMHG | TEMPERATURE: 98 F | HEART RATE: 73 BPM | WEIGHT: 265 LBS | BODY MASS INDEX: 53.42 KG/M2 | HEIGHT: 59 IN

## 2023-10-04 DIAGNOSIS — E66.01 MORBID OBESITY WITH BMI OF 50.0-59.9, ADULT: ICD-10-CM

## 2023-10-04 DIAGNOSIS — N30.01 ACUTE CYSTITIS WITH HEMATURIA: Primary | ICD-10-CM

## 2023-10-04 DIAGNOSIS — R73.03 PREDIABETES: Primary | ICD-10-CM

## 2023-10-04 DIAGNOSIS — F32.0 MILD MAJOR DEPRESSION, SINGLE EPISODE: ICD-10-CM

## 2023-10-04 DIAGNOSIS — Z00.00 ENCOUNTER FOR ANNUAL HEALTH EXAMINATION: ICD-10-CM

## 2023-10-04 DIAGNOSIS — K21.9 GASTROESOPHAGEAL REFLUX DISEASE WITHOUT ESOPHAGITIS: ICD-10-CM

## 2023-10-04 LAB
ERYTHROCYTE [DISTWIDTH] IN BLOOD BY AUTOMATED COUNT: 13.8 % (ref 11.5–14.5)
ESTIMATED AVG GLUCOSE: 108 MG/DL (ref 68–131)
HBA1C MFR BLD: 5.4 % (ref 4–5.6)
HCT VFR BLD AUTO: 44.6 % (ref 37–48.5)
HGB BLD-MCNC: 14.2 G/DL (ref 12–16)
MCH RBC QN AUTO: 29.2 PG (ref 27–31)
MCHC RBC AUTO-ENTMCNC: 31.8 G/DL (ref 32–36)
MCV RBC AUTO: 92 FL (ref 82–98)
PLATELET # BLD AUTO: 320 K/UL (ref 150–450)
PMV BLD AUTO: 10.2 FL (ref 9.2–12.9)
RBC # BLD AUTO: 4.86 M/UL (ref 4–5.4)
WBC # BLD AUTO: 8.44 K/UL (ref 3.9–12.7)

## 2023-10-04 PROCEDURE — 3075F SYST BP GE 130 - 139MM HG: CPT | Mod: CPTII,S$GLB,, | Performed by: INTERNAL MEDICINE

## 2023-10-04 PROCEDURE — 1159F PR MEDICATION LIST DOCUMENTED IN MEDICAL RECORD: ICD-10-PCS | Mod: CPTII,S$GLB,, | Performed by: INTERNAL MEDICINE

## 2023-10-04 PROCEDURE — 3008F PR BODY MASS INDEX (BMI) DOCUMENTED: ICD-10-PCS | Mod: CPTII,S$GLB,, | Performed by: INTERNAL MEDICINE

## 2023-10-04 PROCEDURE — 99395 PR PREVENTIVE VISIT,EST,18-39: ICD-10-PCS | Mod: S$GLB,,, | Performed by: INTERNAL MEDICINE

## 2023-10-04 PROCEDURE — 80061 LIPID PANEL: CPT | Performed by: INTERNAL MEDICINE

## 2023-10-04 PROCEDURE — 3079F DIAST BP 80-89 MM HG: CPT | Mod: CPTII,S$GLB,, | Performed by: INTERNAL MEDICINE

## 2023-10-04 PROCEDURE — 3044F PR MOST RECENT HEMOGLOBIN A1C LEVEL <7.0%: ICD-10-PCS | Mod: CPTII,S$GLB,, | Performed by: INTERNAL MEDICINE

## 2023-10-04 PROCEDURE — 3008F BODY MASS INDEX DOCD: CPT | Mod: CPTII,S$GLB,, | Performed by: INTERNAL MEDICINE

## 2023-10-04 PROCEDURE — 84443 ASSAY THYROID STIM HORMONE: CPT | Performed by: INTERNAL MEDICINE

## 2023-10-04 PROCEDURE — 85027 COMPLETE CBC AUTOMATED: CPT | Performed by: INTERNAL MEDICINE

## 2023-10-04 PROCEDURE — 3044F HG A1C LEVEL LT 7.0%: CPT | Mod: CPTII,S$GLB,, | Performed by: INTERNAL MEDICINE

## 2023-10-04 PROCEDURE — 99999 PR PBB SHADOW E&M-EST. PATIENT-LVL III: CPT | Mod: PBBFAC,,, | Performed by: INTERNAL MEDICINE

## 2023-10-04 PROCEDURE — 83036 HEMOGLOBIN GLYCOSYLATED A1C: CPT | Performed by: INTERNAL MEDICINE

## 2023-10-04 PROCEDURE — 3079F PR MOST RECENT DIASTOLIC BLOOD PRESSURE 80-89 MM HG: ICD-10-PCS | Mod: CPTII,S$GLB,, | Performed by: INTERNAL MEDICINE

## 2023-10-04 PROCEDURE — 1159F MED LIST DOCD IN RCRD: CPT | Mod: CPTII,S$GLB,, | Performed by: INTERNAL MEDICINE

## 2023-10-04 PROCEDURE — 99999 PR PBB SHADOW E&M-EST. PATIENT-LVL III: ICD-10-PCS | Mod: PBBFAC,,, | Performed by: INTERNAL MEDICINE

## 2023-10-04 PROCEDURE — 36415 COLL VENOUS BLD VENIPUNCTURE: CPT | Mod: PO | Performed by: INTERNAL MEDICINE

## 2023-10-04 PROCEDURE — 3075F PR MOST RECENT SYSTOLIC BLOOD PRESS GE 130-139MM HG: ICD-10-PCS | Mod: CPTII,S$GLB,, | Performed by: INTERNAL MEDICINE

## 2023-10-04 PROCEDURE — 80053 COMPREHEN METABOLIC PANEL: CPT | Performed by: INTERNAL MEDICINE

## 2023-10-04 PROCEDURE — 99395 PREV VISIT EST AGE 18-39: CPT | Mod: S$GLB,,, | Performed by: INTERNAL MEDICINE

## 2023-10-04 RX ORDER — NORETHINDRONE ACETATE/ETHINYL ESTRADIOL AND FERROUS FUMARATE 1MG-20(21)
1 KIT ORAL DAILY
COMMUNITY
Start: 2023-07-28

## 2023-10-04 RX ORDER — NITROFURANTOIN 25; 75 MG/1; MG/1
100 CAPSULE ORAL 2 TIMES DAILY
Qty: 10 CAPSULE | Refills: 0 | Status: SHIPPED | OUTPATIENT
Start: 2023-10-04 | End: 2023-10-06 | Stop reason: ALTCHOICE

## 2023-10-04 RX ORDER — RABEPRAZOLE SODIUM 20 MG/1
20 TABLET, DELAYED RELEASE ORAL DAILY
Qty: 90 TABLET | Refills: 3 | Status: SHIPPED | OUTPATIENT
Start: 2023-10-04

## 2023-10-04 NOTE — TELEPHONE ENCOUNTER
I have signed for the following orders AND/OR meds.  Please call the patient and ask the patient to schedule the testing AND/OR inform about any medications that were sent. Medications have been sent to pharmacy listed below      No orders of the defined types were placed in this encounter.      Medications Ordered This Encounter   Medications    nitrofurantoin, macrocrystal-monohydrate, (MACROBID) 100 MG capsule     Sig: Take 1 capsule (100 mg total) by mouth 2 (two) times daily. for 5 days     Dispense:  10 capsule     Refill:  0         [id number] [facility] [place] - [address] [facility] [address]  Phone: [phone] Fax: [phone]    [address] [facility]  [address] [facility] [address]  Phone: [phone] Fax: [phone]    [facility] - [place] - [id number] [facility] [address]  Phone: [phone] Fax: [phone]

## 2023-10-04 NOTE — PROGRESS NOTES
This note is specifically for wellness visit performed today.       Assessment / Plan:      Patient here for annual wellness exam. Health maintenance was reviewed and ordered.    Complete history and physical was completed today.  Complete and thorough medication reconciliation was performed.  Discussed risks and benefits of medications.  Advised patient on orders and health maintenance. Continue current medications listed on your summary sheet.    All questions were answered. Patient had no further concerns. Advised of diagnoses and plan.     Problem List Items Addressed This Visit          Psychiatric    Mild major depression, single episode    Overview     -in remission  -no longer on medications            Endocrine    Morbid obesity with BMI of 50.0-59.9, adult    Overview     General weight loss/lifestyle modification strategies discussed (elicit support from others; identify saboteurs; non-food rewards, etc).  Informal exercise measures discussed, e.g. taking stairs instead of elevator.  Regular aerobic exercise program discussed.  Doing well with GLP1a           Relevant Medications    semaglutide (OZEMPIC) 1 mg/dose (2 mg/1.5 mL) PnIj    Prediabetes - Primary    Overview     Lab Results   Component Value Date    HGBA1C 5.3 02/03/2023   -currently on GLP1a  -due for repeat A1C           Relevant Medications    semaglutide (OZEMPIC) 1 mg/dose (2 mg/1.5 mL) PnIj       GI    Gastroesophageal reflux disease    Overview     -symptoms controlled with daily PPI  -denies alarm symptoms, such as dysphagia, weight loss or N/V  -continue lifestyle modification with avoidance of acidic foods, caffeine, late night eating         Relevant Medications    RABEprazole (ACIPHEX) 20 mg tablet     Other Visit Diagnoses       Encounter for annual health examination        Relevant Orders    Urinalysis, Reflex to Urine Culture Urine, Clean Catch    CBC Without Differential            Follow up for labs today:  cbc,cmp,lipid,a1c,tsh,UA.    Dionne Cole MD       WELLNESS EXAM      Patient ID: Rebecca Blankenship is a 33 y.o. female.  has a past medical history of Gastroesophageal reflux disease (11/20/2015), Insulin resistance, Iron deficiency anemia, Morbid obesity with BMI of 50.0-59.9, adult, Oligomenorrhea, and Oligomenorrhea.     Chief Complaint:  Encounter for wellness exam    Well Adult Physical: Patient here for a comprehensive physical exam.     Health Maintenance Topics with due status: Not Due       Topic Last Completion Date    TETANUS VACCINE 02/01/2021    Hemoglobin A1c (Prediabetes) 02/03/2023        ==============================================    Health Maintenance Due   Topic Date Due    COVID-19 Vaccine (5 - Mixed Product series) 01/18/2022    Cervical Cancer Screening  03/10/2023    Influenza Vaccine (1) 09/01/2023       Past Medical History:  Past Medical History:   Diagnosis Date    Gastroesophageal reflux disease 11/20/2015    Insulin resistance     Iron deficiency anemia     Morbid obesity with BMI of 50.0-59.9, adult     Oligomenorrhea     Oligomenorrhea      History reviewed. No pertinent surgical history.  Review of patient's allergies indicates:  No Known Allergies  Current Outpatient Medications on File Prior to Visit   Medication Sig Dispense Refill    ibuprofen (ADVIL,MOTRIN) 800 MG tablet Take 1 tablet (800 mg total) by mouth 3 (three) times daily as needed for Pain (with food). 90 tablet 0    MARY ELLEN FE 1/20, 28, 1 mg-20 mcg (21)/75 mg (7) per tablet Take 1 tablet by mouth Daily.      [DISCONTINUED] RABEprazole (ACIPHEX) 20 mg tablet Take 1 tablet (20 mg total) by mouth once daily. 30 tablet 0    [DISCONTINUED] albuterol (PROVENTIL/VENTOLIN HFA) 90 mcg/actuation inhaler Inhale 2 puffs into the lungs every 6 (six) hours as needed for Wheezing or Shortness of Breath. 18 g 0    [DISCONTINUED] busPIRone (BUSPAR) 10 MG tablet Take 1 tablet (10 mg total) by mouth 2 (two) times daily. 60  tablet 2    [DISCONTINUED] EScitalopram oxalate (LEXAPRO) 10 MG tablet Take 1 tablet (10 mg total) by mouth once daily. (Patient not taking: Reported on 10/4/2023) 30 tablet 2    [DISCONTINUED] ketoconazole (NIZORAL) 2 % cream Apply topically 2 (two) times daily. 60 g 5    [DISCONTINUED] ketoconazole (NIZORAL) 2 % shampoo Apply topically 3 (three) times a week. Leave on for 5-10 min before rinsing 120 mL 5    [DISCONTINUED] metFORMIN (GLUCOPHAGE) 500 MG tablet Take 1 tablet (500 mg total) by mouth 2 (two) times daily with meals. 180 tablet 1    [DISCONTINUED] tirzepatide 10 mg/0.5 mL PnIj Inject 10 mg into the skin every 7 days. (Patient not taking: Reported on 10/4/2023) 4 pen 5    [DISCONTINUED] traZODone (DESYREL) 50 MG tablet Take 1 tablet (50 mg total) by mouth every evening. (Patient not taking: Reported on 10/4/2023) 30 tablet 2     No current facility-administered medications on file prior to visit.     Social History     Socioeconomic History    Marital status:     Number of children: 0   Occupational History     Employer: [employer]   Tobacco Use    Smoking status: Never    Smokeless tobacco: Never   Substance and Sexual Activity    Alcohol use: Yes     Alcohol/week: 0.0 - 1.0 standard drinks of alcohol     Comment: occ     Drug use: Yes     Frequency: 1.0 times per week     Types: Marijuana    Sexual activity: Yes     Partners: Male     Birth control/protection: OCP   Social History Narrative    The patient is single and has no children.  She lives alone.  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     Social Determinants of Health     Financial Resource Strain: Low Risk  (10/3/2023)    Overall Financial Resource Strain (CARDIA)     Difficulty of Paying Living Expenses: Not very hard   Food Insecurity: No Food Insecurity (10/3/2023)    Hunger Vital Sign     Worried About Running Out of Food in the Last Year: Never true     Ran Out of Food in the Last  Year: Never true   Transportation Needs: No Transportation Needs (10/3/2023)    PRAPARE - Transportation     Lack of Transportation (Medical): No     Lack of Transportation (Non-Medical): No   Physical Activity: Insufficiently Active (10/3/2023)    Exercise Vital Sign     Days of Exercise per Week: 2 days     Minutes of Exercise per Session: 30 min   Stress: No Stress Concern Present (10/3/2023)    [employer] - Occupational Stress Questionnaire     Feeling of Stress : Only a little   Social Connections: Unknown (10/3/2023)    Social Connection and Isolation Panel [NHANES]     Frequency of Communication with Friends and Family: More than three times a week     Frequency of Social Gatherings with Friends and Family: Once a week     Active Member of Clubs or Organizations: No     Attends Club or Organization Meetings: Never     Marital Status:    Housing Stability: Low Risk  (10/3/2023)    Housing Stability Vital Sign     Unable to Pay for Housing in the Last Year: No     Number of Places Lived in the Last Year: 1     Unstable Housing in the Last Year: No     Family History   Problem Relation Age of Onset    Colon cancer Paternal Grandfather     Cancer Paternal Grandfather         Colon    Breast cancer Maternal Aunt     Cancer Maternal Aunt         Breast    Stomach cancer Maternal Uncle     Stomach cancer Maternal Uncle     Drug abuse Maternal Uncle         Mostly all druga    Heart failure Maternal Grandmother     Stroke Maternal Grandmother     Hypertension Maternal Grandmother     Kidney disease Maternal Grandmother     Heart disease Maternal Grandmother     Heart attack Maternal Uncle     Aortic aneurysm Maternal Aunt     Heart disease Maternal Aunt     Coronary artery disease Maternal Aunt         S/P stent    Coronary artery disease Maternal Uncle         S/P stent    Stroke Maternal Uncle     Hypertension Maternal Aunt     Hypertension Maternal Uncle     Fibroids Mother      Heart disease Maternal Uncle        Review of Systems   Constitutional:  Negative for chills, fever and malaise/fatigue.   HENT:  Negative for congestion, hearing loss and sore throat.    Eyes:  Negative for blurred vision, double vision and discharge.   Respiratory:  Negative for cough, shortness of breath and wheezing.    Cardiovascular:  Negative for chest pain, palpitations and leg swelling.   Gastrointestinal:  Negative for abdominal pain, blood in stool, constipation, diarrhea and vomiting.   Genitourinary:  Negative for dysuria, frequency and hematuria.   Musculoskeletal:  Negative for back pain, joint pain and neck pain.   Neurological:  Negative for weakness and headaches.   Endo/Heme/Allergies:  Negative for polydipsia.   Psychiatric/Behavioral:  Negative for depression. The patient is not nervous/anxious.            Objective:      Vitals:    10/04/23 1037   BP: 139/86   Pulse: 73   Temp: 98 °F (36.7 °C)     Body mass index is 53.52 kg/m².     Physical Exam  Constitutional:       General: She is not in acute distress.     Appearance: Normal appearance. She is well-developed. She is obese.   HENT:      Head: Normocephalic and atraumatic.   Eyes:      Conjunctiva/sclera: Conjunctivae normal.   Cardiovascular:      Rate and Rhythm: Normal rate and regular rhythm.      Pulses: Normal pulses.      Heart sounds: Normal heart sounds. No murmur heard.  Pulmonary:      Effort: Pulmonary effort is normal. No respiratory distress.      Breath sounds: Normal breath sounds.   Abdominal:      General: Bowel sounds are normal. There is no distension.      Palpations: Abdomen is soft.      Tenderness: There is no abdominal tenderness.   Musculoskeletal:         General: Normal range of motion.      Cervical back: Normal range of motion and neck supple.   Skin:     General: Skin is warm and dry.      Findings: No rash.   Neurological:      General: No focal deficit present.      Mental Status: She is alert and oriented  to person, place, and time.   Psychiatric:         Mood and Affect: Mood normal.         Behavior: Behavior normal.             All labs, imaging and procedures performed since last visit have been personally reviewed.    Answers submitted by the patient for this visit:  Review of Systems Questionnaire (Submitted on 10/3/2023)  activity change: No  unexpected weight change: No  rhinorrhea: No  trouble swallowing: No  visual disturbance: No  chest tightness: No  polyuria: Yes  difficulty urinating: No  menstrual problem: No  joint swelling: No  arthralgias: No  confusion: No  dysphoric mood: No

## 2023-10-05 LAB
ALBUMIN SERPL BCP-MCNC: 3.6 G/DL (ref 3.5–5.2)
ALP SERPL-CCNC: 133 U/L (ref 55–135)
ALT SERPL W/O P-5'-P-CCNC: 44 U/L (ref 10–44)
ANION GAP SERPL CALC-SCNC: 11 MMOL/L (ref 8–16)
AST SERPL-CCNC: 35 U/L (ref 10–40)
BILIRUB SERPL-MCNC: 1.1 MG/DL (ref 0.1–1)
BUN SERPL-MCNC: 10 MG/DL (ref 6–20)
CALCIUM SERPL-MCNC: 9.7 MG/DL (ref 8.7–10.5)
CHLORIDE SERPL-SCNC: 104 MMOL/L (ref 95–110)
CHOLEST SERPL-MCNC: 200 MG/DL (ref 120–199)
CHOLEST/HDLC SERPL: 3.8 {RATIO} (ref 2–5)
CO2 SERPL-SCNC: 23 MMOL/L (ref 23–29)
CREAT SERPL-MCNC: 0.7 MG/DL (ref 0.5–1.4)
EST. GFR  (NO RACE VARIABLE): >60 ML/MIN/1.73 M^2
GLUCOSE SERPL-MCNC: 66 MG/DL (ref 70–110)
HDLC SERPL-MCNC: 52 MG/DL (ref 40–75)
HDLC SERPL: 26 % (ref 20–50)
LDLC SERPL CALC-MCNC: 129.6 MG/DL (ref 63–159)
NONHDLC SERPL-MCNC: 148 MG/DL
POTASSIUM SERPL-SCNC: 4.2 MMOL/L (ref 3.5–5.1)
PROT SERPL-MCNC: 8.2 G/DL (ref 6–8.4)
SODIUM SERPL-SCNC: 138 MMOL/L (ref 136–145)
TRIGL SERPL-MCNC: 92 MG/DL (ref 30–150)
TSH SERPL DL<=0.005 MIU/L-ACNC: 3.28 UIU/ML (ref 0.4–4)

## 2023-10-06 ENCOUNTER — TELEPHONE (OUTPATIENT)
Dept: FAMILY MEDICINE | Facility: CLINIC | Age: 33
End: 2023-10-06
Payer: COMMERCIAL

## 2023-10-06 RX ORDER — AMOXICILLIN 875 MG/1
875 TABLET, FILM COATED ORAL 2 TIMES DAILY
Qty: 10 TABLET | Refills: 0 | Status: SHIPPED | OUTPATIENT
Start: 2023-10-06 | End: 2023-10-11

## 2023-10-06 NOTE — TELEPHONE ENCOUNTER
Need to change to different antibiotic for UTI. Rx sent to pharmacy.    I have signed for the following orders AND/OR meds.  Please call the patient and ask the patient to schedule the testing AND/OR inform about any medications that were sent. Medications have been sent to pharmacy listed below      No orders of the defined types were placed in this encounter.      Medications Ordered This Encounter   Medications    amoxicillin (AMOXIL) 875 MG tablet     Sig: Take 1 tablet (875 mg total) by mouth 2 (two) times daily. for 5 days     Dispense:  10 tablet     Refill:  0         [place] [id number] [facility] [id number] [facility] [address]  Phone: [phone] Fax: [phone]    [address] [place] [address]  Phone: [phone] Fax: [phone]    [facility]  [facility] [address] [facility] [address]  Phone: [phone] Fax: [phone]

## 2023-10-18 ENCOUNTER — PATIENT MESSAGE (OUTPATIENT)
Dept: FAMILY MEDICINE | Facility: CLINIC | Age: 33
End: 2023-10-18
Payer: COMMERCIAL

## 2023-11-13 ENCOUNTER — PATIENT MESSAGE (OUTPATIENT)
Dept: FAMILY MEDICINE | Facility: CLINIC | Age: 33
End: 2023-11-13
Payer: COMMERCIAL

## 2023-11-13 DIAGNOSIS — Z23 NEED FOR HPV VACCINE: Primary | ICD-10-CM

## 2023-11-16 NOTE — TELEPHONE ENCOUNTER
I have signed for the following orders AND/OR meds.  Please call the patient and ask the patient to schedule the testing AND/OR inform about any medications that were sent. Medications have been sent to pharmacy listed below      Orders Placed This Encounter   Procedures    (In Office Administered) HPV Vaccine (9-Valent) (3 Dose) (IM)              [facility] [address] [facility] [place] - [address] [facility]  [address] [facility] [address]  Phone: [phone] Fax: [phone]    [address] [place] [address]  Phone: [phone] Fax: [phone]    [facility] [place] - [address] [address]  Phone: [phone] Fax: [phone]

## 2023-11-30 ENCOUNTER — PATIENT MESSAGE (OUTPATIENT)
Dept: FAMILY MEDICINE | Facility: CLINIC | Age: 33
End: 2023-11-30
Payer: COMMERCIAL

## 2023-12-01 ENCOUNTER — CLINICAL SUPPORT (OUTPATIENT)
Dept: FAMILY MEDICINE | Facility: CLINIC | Age: 33
End: 2023-12-01
Payer: COMMERCIAL

## 2023-12-01 DIAGNOSIS — Z23 NEED FOR HPV VACCINE: Primary | ICD-10-CM

## 2023-12-01 PROCEDURE — 90651 9VHPV VACCINE 2/3 DOSE IM: CPT | Mod: S$GLB,,, | Performed by: INTERNAL MEDICINE

## 2023-12-01 PROCEDURE — 90471 IMMUNIZATION ADMIN: CPT | Mod: S$GLB,,, | Performed by: INTERNAL MEDICINE

## 2023-12-01 PROCEDURE — 90651 HPV VACCINE 9-VALENT 3 DOSE IM: ICD-10-PCS | Mod: S$GLB,,, | Performed by: INTERNAL MEDICINE

## 2023-12-01 PROCEDURE — 99999 PR PBB SHADOW E&M-EST. PATIENT-LVL II: CPT | Mod: PBBFAC,,,

## 2023-12-01 PROCEDURE — 99999 PR PBB SHADOW E&M-EST. PATIENT-LVL II: ICD-10-PCS | Mod: PBBFAC,,,

## 2023-12-01 PROCEDURE — 90471 HPV VACCINE 9-VALENT 3 DOSE IM: ICD-10-PCS | Mod: S$GLB,,, | Performed by: INTERNAL MEDICINE

## 2023-12-01 NOTE — PROGRESS NOTES
Pt came in for 1st HPV vaccine . Pt tolerated well and was advised to wait 15 minutes in clinic. Pt verbalized understanding . Second and third dose was schedule

## 2023-12-20 ENCOUNTER — PATIENT MESSAGE (OUTPATIENT)
Dept: FAMILY MEDICINE | Facility: CLINIC | Age: 33
End: 2023-12-20
Payer: COMMERCIAL

## 2023-12-22 ENCOUNTER — OFFICE VISIT (OUTPATIENT)
Dept: FAMILY MEDICINE | Facility: CLINIC | Age: 33
End: 2023-12-22
Payer: COMMERCIAL

## 2023-12-22 DIAGNOSIS — G89.29 CHRONIC BILATERAL LOW BACK PAIN WITHOUT SCIATICA: ICD-10-CM

## 2023-12-22 DIAGNOSIS — M54.50 CHRONIC BILATERAL LOW BACK PAIN WITHOUT SCIATICA: ICD-10-CM

## 2023-12-22 DIAGNOSIS — E66.01 MORBID OBESITY WITH BMI OF 50.0-59.9, ADULT: ICD-10-CM

## 2023-12-22 DIAGNOSIS — L68.0 ANDROGEN-DEPENDENT HIRSUTISM: Primary | ICD-10-CM

## 2023-12-22 PROCEDURE — 1159F PR MEDICATION LIST DOCUMENTED IN MEDICAL RECORD: ICD-10-PCS | Mod: CPTII,95,, | Performed by: PHYSICIAN ASSISTANT

## 2023-12-22 PROCEDURE — 1159F MED LIST DOCD IN RCRD: CPT | Mod: CPTII,95,, | Performed by: PHYSICIAN ASSISTANT

## 2023-12-22 PROCEDURE — 3044F PR MOST RECENT HEMOGLOBIN A1C LEVEL <7.0%: ICD-10-PCS | Mod: CPTII,95,, | Performed by: PHYSICIAN ASSISTANT

## 2023-12-22 PROCEDURE — 99214 PR OFFICE/OUTPT VISIT, EST, LEVL IV, 30-39 MIN: ICD-10-PCS | Mod: 95,,, | Performed by: PHYSICIAN ASSISTANT

## 2023-12-22 PROCEDURE — 1160F RVW MEDS BY RX/DR IN RCRD: CPT | Mod: CPTII,95,, | Performed by: PHYSICIAN ASSISTANT

## 2023-12-22 PROCEDURE — 3044F HG A1C LEVEL LT 7.0%: CPT | Mod: CPTII,95,, | Performed by: PHYSICIAN ASSISTANT

## 2023-12-22 PROCEDURE — 99214 OFFICE O/P EST MOD 30 MIN: CPT | Mod: 95,,, | Performed by: PHYSICIAN ASSISTANT

## 2023-12-22 PROCEDURE — 1160F PR REVIEW ALL MEDS BY PRESCRIBER/CLIN PHARMACIST DOCUMENTED: ICD-10-PCS | Mod: CPTII,95,, | Performed by: PHYSICIAN ASSISTANT

## 2023-12-22 RX ORDER — SPIRONOLACTONE 50 MG/1
50 TABLET, FILM COATED ORAL DAILY
Qty: 30 TABLET | Refills: 5 | Status: SHIPPED | OUTPATIENT
Start: 2023-12-22 | End: 2024-06-19

## 2023-12-22 RX ORDER — IBUPROFEN 800 MG/1
800 TABLET ORAL 3 TIMES DAILY PRN
Qty: 90 TABLET | Refills: 0 | Status: SHIPPED | OUTPATIENT
Start: 2023-12-22

## 2023-12-22 RX ORDER — SEMAGLUTIDE 2.68 MG/ML
2 INJECTION, SOLUTION SUBCUTANEOUS
Qty: 3 ML | Refills: 11 | Status: SHIPPED | OUTPATIENT
Start: 2023-12-22 | End: 2024-12-21

## 2023-12-22 NOTE — PROGRESS NOTES
Primary Care Telemedicine Note  The patient location is: Patient Home   The chief complaint leading to consultation is: Medication refill  Total time spent with patient: 15 minutes    Visit type: Virtual visit with synchronous audio only and video  Each patient to whom he or she provides medical services by telemedicine is: (1) informed of the relationship between the physician and patient and the respective role of any other health care provider with respect to management of the patient; and (2) notified that he or she may decline to receive medical services by telemedicine and may withdraw from such care at any time.      Assessment/Plan:    Problem List Items Addressed This Visit          Derm    Androgen-dependent hirsutism - Primary    Relevant Medications    spironolactone (ALDACTONE) 50 MG tablet       Endocrine    Morbid obesity with BMI of 50.0-59.9, adult    Overview     General weight loss/lifestyle modification strategies discussed (elicit support from others; identify saboteurs; non-food rewards, etc).  Informal exercise measures discussed, e.g. taking stairs instead of elevator.  Regular aerobic exercise program discussed.  Doing well with GLP1a           Relevant Medications    semaglutide (OZEMPIC) 2 mg/dose (8 mg/3 mL) PnIj       Orthopedic    Chronic bilateral low back pain without sciatica    Overview     -chronic, stable  -remains on PRN NSAIDs         Relevant Medications    ibuprofen (ADVIL,MOTRIN) 800 MG tablet       Follow up in about 6 months (around 6/22/2024), or if symptoms worsen or fail to improve.    Judy Navarro PA-C  _____________________________________________________________________________________________________________________________________________________    CC: medication refill    HPI: Patient is an established patient who presents today via virtual visit for medication refill.    Patient has a history of insulin resistance and androgen-dependent hirsutism in which she  was previously on spironolactone. She reports taking the medication a few years ago with no change in symptoms, however, she would like to try the medication again since she has lost weight since that time. She is also on Ozempic 1 mg weekly for insulin resistance, prediabetes and weight loss. Denies any current AE. She would also like to increase this medication for further improvement of symptoms.     She is otherwise well and denies any new complaints today.    Past Medical History:   Diagnosis Date    Gastroesophageal reflux disease 11/20/2015    Insulin resistance     Iron deficiency anemia     Morbid obesity with BMI of 50.0-59.9, adult     Oligomenorrhea     Oligomenorrhea      History reviewed. No pertinent surgical history.  Review of patient's allergies indicates:  No Known Allergies  Social History     Tobacco Use    Smoking status: Never    Smokeless tobacco: Never   Substance Use Topics    Alcohol use: Yes     Alcohol/week: 0.0 - 1.0 standard drinks of alcohol     Comment: occ     Drug use: Yes     Frequency: 1.0 times per week     Types: Marijuana     Family History   Problem Relation Age of Onset    Colon cancer Paternal Grandfather     Cancer Paternal Grandfather         Colon    Breast cancer Maternal Aunt     Cancer Maternal Aunt         Breast    Stomach cancer Maternal Uncle     Stomach cancer Maternal Uncle     Drug abuse Maternal Uncle         Mostly all druga    Heart failure Maternal Grandmother     Stroke Maternal Grandmother     Hypertension Maternal Grandmother     Kidney disease Maternal Grandmother     Heart disease Maternal Grandmother     Heart attack Maternal Uncle     Aortic aneurysm Maternal Aunt     Heart disease Maternal Aunt     Coronary artery disease Maternal Aunt         S/P stent    Coronary artery disease Maternal Uncle         S/P stent    Stroke Maternal Uncle     Hypertension Maternal Aunt     Hypertension Maternal Uncle     Fibroids Mother     Heart disease Maternal  Uncle      Current Outpatient Medications on File Prior to Visit   Medication Sig Dispense Refill    MARY ELLEN FE 1/20, 28, 1 mg-20 mcg (21)/75 mg (7) per tablet Take 1 tablet by mouth Daily.      RABEprazole (ACIPHEX) 20 mg tablet Take 1 tablet (20 mg total) by mouth once daily. 90 tablet 3    [DISCONTINUED] ibuprofen (ADVIL,MOTRIN) 800 MG tablet Take 1 tablet (800 mg total) by mouth 3 (three) times daily as needed for Pain (with food). 90 tablet 0    [DISCONTINUED] semaglutide (OZEMPIC) 1 mg/dose (2 mg/1.5 mL) PnIj Inject 1 mg into the skin every 7 days. 3 mL 11     No current facility-administered medications on file prior to visit.       Review of Systems   Constitutional:  Negative for chills, fever and malaise/fatigue.   Respiratory:  Negative for cough and shortness of breath.    Cardiovascular:  Negative for chest pain, palpitations and leg swelling.   Gastrointestinal:  Negative for abdominal pain, constipation and diarrhea.   Genitourinary:  Negative for dysuria and frequency.   Musculoskeletal:  Negative for back pain and joint pain.   Neurological:  Negative for headaches.   Psychiatric/Behavioral:  Negative for depression. The patient is not nervous/anxious.        Physical Exam  Constitutional:       General: She is not in acute distress.     Appearance: She is well-developed.   HENT:      Head: Normocephalic and atraumatic.   Pulmonary:      Effort: Pulmonary effort is normal. No respiratory distress.   Abdominal:      General: There is no distension.   Musculoskeletal:         General: Normal range of motion.      Cervical back: Normal range of motion.   Neurological:      Mental Status: She is alert and oriented to person, place, and time.   Psychiatric:         Mood and Affect: Mood normal.         Behavior: Behavior normal.         The patient's Health Maintenance was reviewed and the following appears to be due at this time:  Health Maintenance Due   Topic Date Due    Cervical Cancer Screening   03/10/2023    COVID-19 Vaccine (5 - 2023-24 season) 09/01/2023

## 2024-02-01 ENCOUNTER — TELEPHONE (OUTPATIENT)
Dept: FAMILY MEDICINE | Facility: CLINIC | Age: 34
End: 2024-02-01
Payer: COMMERCIAL

## 2024-02-01 DIAGNOSIS — Z23 IMMUNIZATION DUE: Primary | ICD-10-CM

## 2024-02-06 ENCOUNTER — CLINICAL SUPPORT (OUTPATIENT)
Dept: FAMILY MEDICINE | Facility: CLINIC | Age: 34
End: 2024-02-06
Payer: COMMERCIAL

## 2024-02-06 DIAGNOSIS — Z23 IMMUNIZATION DUE: Primary | ICD-10-CM

## 2024-02-06 PROCEDURE — 90651 9VHPV VACCINE 2/3 DOSE IM: CPT | Mod: S$GLB,,, | Performed by: PHYSICIAN ASSISTANT

## 2024-02-06 PROCEDURE — 90471 IMMUNIZATION ADMIN: CPT | Mod: S$GLB,,, | Performed by: PHYSICIAN ASSISTANT

## 2024-02-06 PROCEDURE — 99999 PR PBB SHADOW E&M-EST. PATIENT-LVL II: CPT | Mod: PBBFAC,,,

## 2024-02-20 ENCOUNTER — OFFICE VISIT (OUTPATIENT)
Dept: FAMILY MEDICINE | Facility: CLINIC | Age: 34
End: 2024-02-20
Payer: COMMERCIAL

## 2024-02-20 ENCOUNTER — PATIENT MESSAGE (OUTPATIENT)
Dept: FAMILY MEDICINE | Facility: CLINIC | Age: 34
End: 2024-02-20

## 2024-02-20 VITALS
SYSTOLIC BLOOD PRESSURE: 131 MMHG | WEIGHT: 257 LBS | DIASTOLIC BLOOD PRESSURE: 87 MMHG | RESPIRATION RATE: 18 BRPM | HEART RATE: 76 BPM | HEIGHT: 59 IN | BODY MASS INDEX: 51.81 KG/M2

## 2024-02-20 DIAGNOSIS — R10.84 GENERALIZED ABDOMINAL PAIN: Primary | ICD-10-CM

## 2024-02-20 PROCEDURE — 1159F MED LIST DOCD IN RCRD: CPT | Mod: CPTII,S$GLB,, | Performed by: PHYSICIAN ASSISTANT

## 2024-02-20 PROCEDURE — 3008F BODY MASS INDEX DOCD: CPT | Mod: CPTII,S$GLB,, | Performed by: PHYSICIAN ASSISTANT

## 2024-02-20 PROCEDURE — 1160F RVW MEDS BY RX/DR IN RCRD: CPT | Mod: CPTII,S$GLB,, | Performed by: PHYSICIAN ASSISTANT

## 2024-02-20 PROCEDURE — 99999 PR PBB SHADOW E&M-EST. PATIENT-LVL IV: CPT | Mod: PBBFAC,,, | Performed by: PHYSICIAN ASSISTANT

## 2024-02-20 PROCEDURE — 3075F SYST BP GE 130 - 139MM HG: CPT | Mod: CPTII,S$GLB,, | Performed by: PHYSICIAN ASSISTANT

## 2024-02-20 PROCEDURE — 99214 OFFICE O/P EST MOD 30 MIN: CPT | Mod: S$GLB,,, | Performed by: PHYSICIAN ASSISTANT

## 2024-02-20 PROCEDURE — 3079F DIAST BP 80-89 MM HG: CPT | Mod: CPTII,S$GLB,, | Performed by: PHYSICIAN ASSISTANT

## 2024-02-20 RX ORDER — TRAMADOL HYDROCHLORIDE 50 MG/1
50 TABLET ORAL EVERY 6 HOURS
Qty: 20 TABLET | Refills: 0 | Status: SHIPPED | OUTPATIENT
Start: 2024-02-20 | End: 2024-04-25

## 2024-02-20 NOTE — PATIENT INSTRUCTIONS
Damien Fernandez,     If you are due for any health screening(s) below please notify me so we can arrange them to be ordered and scheduled. Most healthy patients at your age complete them, but you are free to accept or refuse.     If you can't do it, I'll definitely understand. If you can, I'd certainly appreciate it!    All of your core healthy metrics are met.

## 2024-02-20 NOTE — LETTER
February 21, 2024      [facility]  [address] [facility] [place] [address]  Phone: [phone]  Fax: [phone]       Patient: Rebecca Blankenship   YOB: 1990  Date of Visit: 02/21/2024    To Whom It May Concern:    Sherrie Blankenship  was at [facility] on 02/20/2024. The patient may return to work/school on 02/23/02024 with no restrictions. If you have any questions or concerns, or if I can be of further assistance, please do not hesitate to contact me.    Sincerely,    Aliyah Bui MA

## 2024-02-20 NOTE — PROGRESS NOTES
Assessment/Plan:    Problem List Items Addressed This Visit    None  Visit Diagnoses       Generalized abdominal pain    -  Primary    Relevant Medications    traMADoL (ULTRAM) 50 mg tablet    Other Relevant Orders    Ambulatory referral/consult to Gastroenterology        -abdominal pain >5 days  -recent workup in the ED including labs, urinalysis, and CT abd/pelvis unremarkable  -could be AE of Ozempic; advised patient to stop the medication  -will Rx short course of PRN Tramadol only for severe pain. Discussed potential AE. The patient was checked in the [facility] Board of Pharmacy's Prescription Monitoring Program. There appears to be no incongruities with the patient's verbalized history.   -also referring to GI for further evaluation  -ER precautions for severe or worsening of symptoms    Follow up if symptoms worsen or fail to improve.    Judy Navarro PA-C  _____________________________________________________________________________________________________________________________________________________    CC: abdominal pain    HPI: Patient is in clinic today as an established patient here for abdominal pain. Symptom onset was about 5 days ago, but has worsened since that time. She describes it as cramping sensation located all throughout her abdomen. She has associated nausea. She was having constipation, however, she reports taking laxatives with improvement in symptoms. Denies any current fever, chills, diarrhea, constipation, bloody/black stool or urinary/vaginal symptoms. She stated that symptoms are worse after meals. She has been evaluated in the ED x 2 over the past few days due to severe symptoms. She reports going to [facility] on 2/15/24 in which her labs and urinalysis were unremarkable. She was prescribed PRN Reglan at that time with minimal improvement of symptoms. She returned to the ED at [facility] on 2/17/24, but eventually left due to long wait time. She reports going to [place] on 2/18/24 for continued symptoms in which she had repeat labs and urinalysis which were also unremarkable. She also had a CT abd/pelvis which showed signs of enterocolitis vs. diarrheal illness. She was prescribed PRN Zofran and Levsin and discharged in stable condition. Today, patient stated that she continues to have severe abdominal pain. She reports minimal improvement in symptoms with current medication. She has a history of GERD in which she remains on daily PPI otherwise denies history of any significant GI problems. She is currently on Ozempic 2 mg weekly for prediabetes and obesity and has been on the medication for >1 year. She is due for her injection today. No other complaints.    Past Medical History:   Diagnosis Date    Gastroesophageal reflux disease 11/20/2015    Insulin resistance     Iron deficiency anemia     Morbid obesity with BMI of 50.0-59.9, adult     Oligomenorrhea     Oligomenorrhea      No past surgical history on file.  Review of patient's allergies indicates:  No Known Allergies  Social History     Tobacco Use    Smoking status: Never    Smokeless tobacco: Never   Substance Use Topics    Alcohol use: Yes     Alcohol/week: 0.0 - 1.0 standard drinks of alcohol     Comment: occ     Drug use: Yes     Frequency: 1.0 times per week     Types: Marijuana     Family History   Problem Relation Age of Onset    Colon cancer Paternal Grandfather     Cancer Paternal Grandfather         Colon    Breast cancer Maternal Aunt     Cancer Maternal Aunt         Breast    Stomach cancer Maternal Uncle     Stomach cancer Maternal Uncle     Drug abuse Maternal Uncle         Mostly all druga    Heart failure Maternal Grandmother     Stroke Maternal Grandmother     Hypertension Maternal Grandmother     Kidney disease Maternal Grandmother     Heart disease Maternal Grandmother     Heart attack Maternal Uncle     Aortic aneurysm Maternal Aunt     Heart disease Maternal Aunt     Coronary artery disease  "Maternal Aunt         S/P stent    Coronary artery disease Maternal Uncle         S/P stent    Stroke Maternal Uncle     Hypertension Maternal Aunt     Hypertension Maternal Uncle     Fibroids Mother     Heart disease Maternal Uncle      Current Outpatient Medications on File Prior to Visit   Medication Sig Dispense Refill    ibuprofen (ADVIL,MOTRIN) 800 MG tablet Take 1 tablet (800 mg total) by mouth 3 (three) times daily as needed for Pain (with food). 90 tablet 0    MARY ELLEN FE 1/20, 28, 1 mg-20 mcg (21)/75 mg (7) per tablet Take 1 tablet by mouth Daily.      RABEprazole (ACIPHEX) 20 mg tablet Take 1 tablet (20 mg total) by mouth once daily. 90 tablet 3    semaglutide (OZEMPIC) 2 mg/dose (8 mg/3 mL) PnIj Inject 2 mg into the skin every 7 days. 3 mL 11    spironolactone (ALDACTONE) 50 MG tablet Take 1 tablet (50 mg total) by mouth once daily. 30 tablet 5     No current facility-administered medications on file prior to visit.       Review of Systems   Constitutional:  Negative for chills, diaphoresis, fatigue and fever.   HENT:  Negative for congestion, ear pain, postnasal drip, sinus pain and sore throat.    Eyes:  Negative for pain and redness.   Respiratory:  Negative for cough, chest tightness and shortness of breath.    Cardiovascular:  Negative for chest pain and leg swelling.   Gastrointestinal:  Positive for abdominal pain. Negative for constipation, diarrhea, nausea and vomiting.   Genitourinary:  Negative for dysuria, frequency and hematuria.   Musculoskeletal:  Negative for arthralgias, joint swelling and myalgias.   Skin:  Negative for rash.   Neurological:  Negative for dizziness, syncope and headaches.   Psychiatric/Behavioral:  Negative for dysphoric mood. The patient is not nervous/anxious.        Vitals:    02/20/24 0929   BP: 131/87   BP Location: Right arm   Patient Position: Sitting   Pulse: 76   Resp: 18   Weight: 116.6 kg (257 lb)   Height: 4' 11" (1.499 m)       Wt Readings from Last 3 " Encounters:   02/20/24 116.6 kg (257 lb)   10/04/23 120.2 kg (265 lb)   08/29/23 122 kg (268 lb 14.4 oz)       Physical Exam  Constitutional:       General: She is not in acute distress.     Appearance: Normal appearance. She is well-developed.   HENT:      Head: Normocephalic and atraumatic.   Eyes:      Conjunctiva/sclera: Conjunctivae normal.   Cardiovascular:      Rate and Rhythm: Normal rate and regular rhythm.      Pulses: Normal pulses.      Heart sounds: Normal heart sounds. No murmur heard.  Pulmonary:      Effort: Pulmonary effort is normal. No respiratory distress.      Breath sounds: Normal breath sounds.   Abdominal:      General: Bowel sounds are normal. There is no distension.      Palpations: Abdomen is soft.      Tenderness: There is generalized abdominal tenderness. There is no right CVA tenderness, left CVA tenderness, guarding or rebound.   Musculoskeletal:         General: Normal range of motion.      Cervical back: Normal range of motion and neck supple.   Skin:     General: Skin is warm and dry.      Findings: No rash.   Neurological:      General: No focal deficit present.      Mental Status: She is alert and oriented to person, place, and time.   Psychiatric:         Mood and Affect: Mood normal.         Behavior: Behavior normal.         Health Maintenance   Topic Date Due    TETANUS VACCINE  02/01/2031    Hepatitis C Screening  Completed    Lipid Panel  Completed

## 2024-03-13 NOTE — TELEPHONE ENCOUNTER
CARDIOVASCULAR CONSULTATION    REASON FOR CONSULT:   Juanita Salas is a 48 y.o. female who presents for HTN.    PCP: Marilin  HISTORY OF PRESENT ILLNESS:   Follows with Dr. Ruelas (cardiologist in [facility]) for dysautonomia.      The patient is a 48-year-old woman who comes in today at her request for evaluation of hypertension.  She reports a history of dysautonomia and states that she will get symptoms out of the blue.  These symptoms will include hypertension and nosebleeds.  She stated she got this recently with a systolic pressure of 168.  She denies any syncope.  There has been no chest pain or dyspnea.  There has been no PND, orthopnea, melena, hematuria, or claudication symptoms.      The patient denies alcohol excess or tobacco use.      Family history is negative for premature CAD amongst first-degree relatives.      CARDIOVASCULAR HISTORY:   ?dysautomomia    PAST MEDICAL HISTORY:     Past Medical History:   Diagnosis Date    Adjustment disorder     Allergy     Anxiety     Depression     Fatigue     Headache     Hx of psychiatric care     Low vitamin D level     Panic disorder     Psychiatric problem        PAST SURGICAL HISTORY:     Past Surgical History:   Procedure Laterality Date     SECTION      DILATION AND CURETTAGE OF UTERUS         ALLERGIES AND MEDICATION:     Review of patient's allergies indicates:   Allergen Reactions    Azithromycin Nausea And Vomiting        Medication List            Accurate as of 2024 11:48 AM. If you have any questions, ask your nurse or doctor.                CONTINUE taking these medications      cholecalciferol (vitamin D3) 25 mcg (1,000 unit) capsule  Commonly known as: VITAMIN D3     clonazePAM 0.5 MG tablet  Commonly known as: KlonoPIN     cloNIDine 0.1 MG tablet  Commonly known as: CATAPRES     dextroamphetamine-amphetamine 15 mg tablet  Commonly known as: ADDERALL     ergocalciferol 50,000 unit Cap  Commonly known as: ERGOCALCIFEROL    Patient requesting medication for anxiety but unable to come to [place] at this time.  Last office visit was on 07/16/2018.  Please advise     hydrocortisone 2.5 % rectal cream  Commonly known as: PROCTO-MED HC  Place rectally 2 (two) times daily.     ZETIA 10 mg tablet  Generic drug: ezetimibe              SOCIAL HISTORY:     Social History     Socioeconomic History    Marital status:    Tobacco Use    Smoking status: Former     Current packs/day: 0.00     Average packs/day: 0.5 packs/day for 2.0 years (1.0 ttl pk-yrs)     Types: Cigarettes     Start date: 2015     Quit date: 2017     Years since quittin.3    Smokeless tobacco: Never   Substance and Sexual Activity    Alcohol use: No    Drug use: No    Sexual activity: Yes     Partners: Male     Birth control/protection: Condom   Social History Narrative     with son (23) and daugther (14)     Social Determinants of Health     Financial Resource Strain: Medium Risk (2023)    Overall Financial Resource Strain (CARDIA)     Difficulty of Paying Living Expenses: Somewhat hard   Food Insecurity: No Food Insecurity (2023)    Hunger Vital Sign     Worried About Running Out of Food in the Last Year: Never true     Ran Out of Food in the Last Year: Never true   Transportation Needs: No Transportation Needs (2023)    PRAPARE - Transportation     Lack of Transportation (Medical): No     Lack of Transportation (Non-Medical): No   Physical Activity: Insufficiently Active (2023)    Exercise Vital Sign     Days of Exercise per Week: 2 days     Minutes of Exercise per Session: 20 min   Stress: Stress Concern Present (2023)    [employer] - Occupational Stress Questionnaire     Feeling of Stress : Very much   Social Connections: Unknown (2023)    Social Connection and Isolation Panel [NHANES]     Frequency of Communication with Friends and Family: More than three times a week     Frequency of Social Gatherings with Friends and Family: More than three times a week     Active Member of Clubs or Organizations: No     Attends Club or  Organization Meetings: Never     Marital Status:    Housing Stability: Low Risk  (7/27/2023)    Housing Stability Vital Sign     Unable to Pay for Housing in the Last Year: No     Number of Places Lived in the Last Year: 1     Unstable Housing in the Last Year: No       FAMILY HISTORY:     Family History   Problem Relation Age of Onset    Diabetes Mother     Pancreatic cancer Mother 47    Skin cancer Father         NMSC    Multiple sclerosis Sister     Cancer Sister 41        uterine origin suspected    Eczema Daughter     Hypertension Maternal Aunt     Hyperlipidemia Maternal Aunt     Hyperlipidemia Maternal Uncle     Hypertension Maternal Uncle     Hyperlipidemia Paternal Aunt     Hypertension Paternal Aunt     Hyperlipidemia Paternal Uncle     Hypertension Paternal Uncle     Stroke Maternal Grandmother     Psoriasis Maternal Grandmother     Heart disease Maternal Grandfather     Hyperlipidemia Maternal Grandfather     Hypertension Maternal Grandfather     Skin cancer Paternal Grandmother         NMSC    Other Paternal Grandfather         patient believes he had bladder cancer    Other Paternal Aunt         pheochromocytoma; Von-Hippel-Lindau syndrome    Other Paternal Cousin         brain tumor- benign vs malignant?    Colon cancer Neg Hx     Esophageal cancer Neg Hx     Stomach cancer Neg Hx     Rectal cancer Neg Hx     Ulcerative colitis Neg Hx     Irritable bowel syndrome Neg Hx     Crohn's disease Neg Hx     Celiac disease Neg Hx     Asthma Neg Hx     Allergic rhinitis Neg Hx     Allergies Neg Hx     Angioedema Neg Hx     Atopy Neg Hx     Immunodeficiency Neg Hx     Rhinitis Neg Hx     Urticaria Neg Hx        REVIEW OF SYSTEMS:   Review of Systems   Constitutional:  Negative for chills, diaphoresis and fever.   HENT:  Positive for nosebleeds.    Eyes:  Negative for blurred vision, double vision and photophobia.   Respiratory:  Negative for hemoptysis, shortness of breath and wheezing.   "  Cardiovascular:  Positive for palpitations. Negative for chest pain, orthopnea, claudication, leg swelling and PND.   Gastrointestinal:  Negative for abdominal pain, blood in stool, heartburn, melena, nausea and vomiting.   Genitourinary:  Negative for flank pain and hematuria.   Musculoskeletal:  Negative for falls, myalgias and neck pain.   Skin:  Negative for rash.   Neurological:  Negative for dizziness, seizures, loss of consciousness, weakness and headaches.   Endo/Heme/Allergies:  Negative for polydipsia. Does not bruise/bleed easily.   Psychiatric/Behavioral:  Negative for depression and memory loss. The patient is not nervous/anxious.        PHYSICAL EXAM:     Vitals:    03/13/24 1130   BP: (!) 160/82   Pulse: 100   Resp: 18    Body mass index is 28.82 kg/m².  Weight: 73.8 kg (162 lb 11.2 oz)   Height: 5' 3" (160 cm)     Physical Exam  Vitals reviewed.   Constitutional:       General: She is not in acute distress.     Appearance: Normal appearance. She is well-developed. She is not ill-appearing, toxic-appearing or diaphoretic.   HENT:      Head: Normocephalic and atraumatic.   Eyes:      General: No scleral icterus.     Extraocular Movements: Extraocular movements intact.      Conjunctiva/sclera: Conjunctivae normal.      Pupils: Pupils are equal, round, and reactive to light.   Neck:      Thyroid: No thyromegaly.      Vascular: Normal carotid pulses. No JVD.      Trachea: Trachea normal.   Cardiovascular:      Rate and Rhythm: Normal rate and regular rhythm.      Pulses:           Carotid pulses are 2+ on the right side and 2+ on the left side.     Heart sounds: S1 normal and S2 normal. No murmur heard.     No friction rub. No gallop.   Pulmonary:      Effort: Pulmonary effort is normal. No respiratory distress.      Breath sounds: Normal breath sounds. No stridor. No wheezing, rhonchi or rales.   Chest:      Chest wall: No tenderness.   Abdominal:      General: There is no distension.      Palpations: " Abdomen is soft.   Musculoskeletal:         General: No swelling or tenderness. Normal range of motion.      Cervical back: Normal range of motion and neck supple. No edema or rigidity.      Right lower leg: No edema.      Left lower leg: No edema.   Feet:      Right foot:      Skin integrity: No ulcer.      Left foot:      Skin integrity: No ulcer.   Skin:     General: Skin is warm and dry.      Coloration: Skin is not jaundiced.   Neurological:      General: No focal deficit present.      Mental Status: She is alert and oriented to person, place, and time.      Cranial Nerves: No cranial nerve deficit.   Psychiatric:         Mood and Affect: Mood normal.         Speech: Speech normal.         Behavior: Behavior normal. Behavior is cooperative.         DATA:   EKG: (personally reviewed tracing(s))  3/13/24 , RAD, NSSTTW changes    Laboratory:  CBC:  Recent Labs   Lab 09/21/22  1201 07/27/23  1155 10/05/23  0725   WBC 8.16 8.34 8.09   Hemoglobin 12.7 13.3 13.2   Hematocrit 39.3 40.6 40.8   Platelets 332 327 317       CHEMISTRIES:  Recent Labs   Lab 06/04/21  0933 04/11/22  0750 07/27/23  1155 10/05/23  0725   Glucose 96 103 89 101   Sodium 139 141 138 140   Potassium 3.9 3.8 4.0 3.6   BUN 12 14 10 12   Creatinine 0.57 0.7 0.6 0.7   eGFR if non  112 >60.0  --   --    eGFR  --   --  >60.0 >60.0   Calcium 9.5 9.0 9.0 8.8       CARDIAC BIOMARKERS:        COAGS:  Recent Labs   Lab 10/05/23  0725   INR 0.9       LIPIDS/LFTS:  Recent Labs   Lab 06/04/21  0933 04/11/22  0750 10/05/23  0725   Cholesterol 214 H 196 206 H   Triglycerides 148 74 92   HDL 59 52 55   LDL Cholesterol 129 H 129.2 132.6   Non-HDL Cholesterol  --  144 151   Non HDL Chol. (LDL+VLDL) 155 H  --   --    AST 14 16 15   ALT 13 14 16     Lab Results   Component Value Date    TSH 2.538 03/03/2023     The 10-year ASCVD risk score (Gabriel GUADALUPE, et al., 2019) is: 1.7%    Values used to calculate the score:      Age: 48 years      Sex:  Female      Is Non- : No      Diabetic: No      Tobacco smoker: No      Systolic Blood Pressure: 160 mmHg      Is BP treated: No      HDL Cholesterol: 55 mg/dL      Total Cholesterol: 206 mg/dL        Cardiovascular Testing:  EVM 9/16/20  The patient's baseline rhythm was sinus rhythm.  There were 5 patient activations for palpitations that corresponded with sinus rhythm. 1 transmission noted a PAC.  There were 2 patient activations for dizziness corresponding with sinus rhythm. 1 transmission noted a PVC.  There were no auto-activations.  The overall rate range across activations was  bpm.     Echo 9/16/20  Normal left ventricular systolic function. The estimated ejection fraction is 63%.  Normal LV diastolic function.  Normal right ventricular systolic function.  Mild mitral regurgitation.  Normal central venous pressure (3 mmHg).  The estimated PA systolic pressure is 24 mmHg.      ASSESSMENT:   # HTN, uncontrolled  # ?dysautonomia, followed by Dr. Ruelas (cardiologist in [place])  # abnl EKG    PLAN:   Cont med rx  Stop prn clonidine  Start HCTZ 25mg qd  Check BMP 1 week  RN BP check 2 weeks (Dr. Gaston)  Get recent labs from [facility]  Check echo  RTC 1 month (Apr 2024)    Erick Barger MD, FACC

## 2024-04-02 ENCOUNTER — LAB VISIT (OUTPATIENT)
Dept: LAB | Facility: HOSPITAL | Age: 34
End: 2024-04-02
Attending: INTERNAL MEDICINE
Payer: COMMERCIAL

## 2024-04-02 DIAGNOSIS — Z00.00 ENCOUNTER FOR ANNUAL HEALTH EXAMINATION: ICD-10-CM

## 2024-04-02 LAB
ESTIMATED AVG GLUCOSE: 105 MG/DL (ref 68–131)
HBA1C MFR BLD: 5.3 % (ref 4–5.6)

## 2024-04-02 PROCEDURE — 36415 COLL VENOUS BLD VENIPUNCTURE: CPT | Mod: PO | Performed by: INTERNAL MEDICINE

## 2024-04-02 PROCEDURE — 83036 HEMOGLOBIN GLYCOSYLATED A1C: CPT | Performed by: INTERNAL MEDICINE

## 2024-04-03 ENCOUNTER — PATIENT MESSAGE (OUTPATIENT)
Dept: PULMONOLOGY | Facility: CLINIC | Age: 34
End: 2024-04-03
Payer: COMMERCIAL

## 2024-04-14 ENCOUNTER — PATIENT MESSAGE (OUTPATIENT)
Dept: FAMILY MEDICINE | Facility: CLINIC | Age: 34
End: 2024-04-14
Payer: COMMERCIAL

## 2024-04-16 ENCOUNTER — E-VISIT (OUTPATIENT)
Dept: FAMILY MEDICINE | Facility: CLINIC | Age: 34
End: 2024-04-16
Payer: COMMERCIAL

## 2024-04-16 DIAGNOSIS — E66.01 MORBID OBESITY WITH BMI OF 50.0-59.9, ADULT: Primary | ICD-10-CM

## 2024-04-16 PROCEDURE — 99421 OL DIG E/M SVC 5-10 MIN: CPT | Mod: ,,, | Performed by: PHYSICIAN ASSISTANT

## 2024-04-17 RX ORDER — PHENTERMINE HYDROCHLORIDE 37.5 MG/1
37.5 TABLET ORAL
Qty: 30 TABLET | Refills: 0 | Status: SHIPPED | OUTPATIENT
Start: 2024-04-17 | End: 2024-05-13 | Stop reason: SDUPTHER

## 2024-04-17 NOTE — PROGRESS NOTES
Patient ID: Rebecca Blankenship is a 33 y.o. female.    Chief Complaint: Medication Management (Entered automatically based on patient selection in Patient Portal.)    The patient initiated a request through [employer] on 4/16/2024 for evaluation and management with a chief complaint of Medication Management (Entered automatically based on patient selection in Patient Portal.)     I evaluated the questionnaire submission on 4/17/24.    Ohs Peq Evisit Medication    4/16/2024  8:30 AM CDT - Filed by Patient   Do you agree to participate in an E-Visit? Yes   If you have any of the following symptoms, please present to your local emergency room or call 911:  I acknowledge   Medication requests for narcotics will not be addressed via an E-Visit.  Please schedule an appointment. I acknowledge   Are you pregnant, could you be pregnant, or are you breast feeding? None of the above   Do you want to address a new or existing medication? I would like to start a new medication that I do not already take   What is the main issue you would like addressed today? Ozempic     [facility]s Peq Evisit Medication [facility]    4/16/2024  8:30 AM CDT - Filed by Patient   What is the name of the medication that you would like to start? Weight loss   Have you taken a similar medication in the past? No   Why are you requesting this particular medication? Ozempic had me in [facility]    What medical condition is the  medication intended to treat? Weight loss   Provide any additional information you feel is important. A pill you mentioned you would try after mu stomach heeled from being inflamed   Please attach any relevant images or files    Are you able to take your vital signs? No          Active Problem List with Overview Notes    Diagnosis Date Noted    Chronic bilateral low back pain without sciatica 12/22/2023     -chronic, stable  -remains on PRN NSAIDs      Anxiety 03/29/2023     -chronic, worsening over the past week  -has  previously tried Zoloft and Vistaril which were ineffective  -trial of Buspar  -will consider SSRI/SNRI if symptoms persist  -discussed anxiety condition course  -discussed SSRI/SNRI as first-line treatment for this condition  -discussed risk of discontinuing this medication without tapering  -patient was educated, advised of side effects, and all questions were answered. Patient voiced understanding  -patient will follow up routinely and notify us if having any side effects or worsening or persistent symptoms. ER precautions were given.      Primary insomnia 03/29/2023     -chronic, likely due to anxiety  -start Trazodone 50 mg qhs  -has tried multiple OTC medication with minimal benefit  -discussed importance of good sleep hygiene practices        Mild major depression, single episode 12/18/2020     -in remission  -no longer on medications      Generalized anxiety disorder with panic attacks 12/18/2020    Osteoarthritis of both hips 09/17/2019    Prediabetes 12/16/2015     Lab Results   Component Value Date    HGBA1C 5.3 02/03/2023   -currently on GLP1a  -due for repeat A1C        Androgen-dependent hirsutism 12/16/2015    Gastroesophageal reflux disease 11/20/2015     -symptoms controlled with daily PPI  -denies alarm symptoms, such as dysphagia, weight loss or N/V  -continue lifestyle modification with avoidance of acidic foods, caffeine, late night eating      Morbid obesity with BMI of 50.0-59.9, adult      General weight loss/lifestyle modification strategies discussed (elicit support from others; identify saboteurs; non-food rewards, etc).  Informal exercise measures discussed, e.g. taking stairs instead of elevator.  Regular aerobic exercise program discussed.  Doing well with GLP1a          Recent Labs Obtained:  No visits with results within 7 Day(s) from this visit.   Latest known visit with results is:   Lab Visit on 04/02/2024   Component Date Value Ref Range Status    Hemoglobin A1C 04/02/2024 5.3   4.0 - 5.6 % Final    Comment: ADA Screening Guidelines:  5.7-6.4%  Consistent with prediabetes  >or=6.5%  Consistent with diabetes    High levels of fetal hemoglobin interfere with the HbA1C  assay. Heterozygous hemoglobin variants (HbS, HgC, etc)do  not significantly interfere with this assay.   However, presence of multiple variants may affect accuracy.      Estimated Avg Glucose 04/02/2024 105  68 - 131 mg/dL Final       Encounter Diagnosis   Name Primary?    Morbid obesity with BMI of 50.0-59.9, adult Yes        No orders of the defined types were placed in this encounter.     Medications Ordered This Encounter   Medications    phentermine (ADIPEX-P) 37.5 mg tablet     Sig: Take 1 tablet (37.5 mg total) by mouth before breakfast.     Dispense:  30 tablet     Refill:  0        E-Visit Time Tracking:

## 2024-04-19 DIAGNOSIS — K21.9 GASTROESOPHAGEAL REFLUX DISEASE WITHOUT ESOPHAGITIS: ICD-10-CM

## 2024-04-19 NOTE — TELEPHONE ENCOUNTER
No care due was identified.  [facility] Embedded Care Due Messages. Reference number: [id number].   4/19/2024 2:06:26 PM CDT   Pt very confused.

## 2024-04-20 RX ORDER — RABEPRAZOLE SODIUM 20 MG/1
20 TABLET, DELAYED RELEASE ORAL DAILY
Qty: 90 TABLET | Refills: 1 | Status: SHIPPED | OUTPATIENT
Start: 2024-04-20

## 2024-04-21 NOTE — TELEPHONE ENCOUNTER
Refill Decision Note   Christmikal Blankenship  is requesting a refill authorization.  Brief Assessment and Rationale for Refill:  Approve     Medication Therapy Plan:         Comments:     Note composed:10:53 PM 04/20/2024

## 2024-04-24 ENCOUNTER — E-VISIT (OUTPATIENT)
Dept: FAMILY MEDICINE | Facility: CLINIC | Age: 34
End: 2024-04-24
Payer: COMMERCIAL

## 2024-04-24 DIAGNOSIS — F32.0 MILD MAJOR DEPRESSION, SINGLE EPISODE: Primary | ICD-10-CM

## 2024-04-24 PROCEDURE — 99421 OL DIG E/M SVC 5-10 MIN: CPT | Mod: ,,, | Performed by: INTERNAL MEDICINE

## 2024-04-25 ENCOUNTER — TELEPHONE (OUTPATIENT)
Dept: PHARMACY | Facility: CLINIC | Age: 34
End: 2024-04-25
Payer: COMMERCIAL

## 2024-04-26 ENCOUNTER — TELEPHONE (OUTPATIENT)
Dept: FAMILY MEDICINE | Facility: CLINIC | Age: 34
End: 2024-04-26
Payer: COMMERCIAL

## 2024-04-26 DIAGNOSIS — Z23 NEED FOR HPV VACCINE: Primary | ICD-10-CM

## 2024-04-29 NOTE — TELEPHONE ENCOUNTER
I have signed for the following orders AND/OR meds.  Please call the patient and ask the patient to schedule the testing AND/OR inform about any medications that were sent. Medications have been sent to pharmacy listed below      No orders of the defined types were placed in this encounter.      Medications Ordered This Encounter   Medications    varicella-zoster gE vac,2 of 2 SusR 0.5 mL         [facility] [address] [place], [place] - [address] [facility]  [address] [facility] [address]  Phone: [phone] Fax: [phone]    [address] [place] [address]  [address] [place] [address]  Phone: [phone] Fax: [phone]    [facility] [address] [facility] [address]  Phone: [phone] Fax: [phone]

## 2024-04-30 ENCOUNTER — TELEPHONE (OUTPATIENT)
Dept: FAMILY MEDICINE | Facility: CLINIC | Age: 34
End: 2024-04-30
Payer: COMMERCIAL

## 2024-04-30 NOTE — TELEPHONE ENCOUNTER
----- Message from Emma Fernando sent at 4/30/2024  9:54 AM CDT -----  Contact: [phone]  Patient needs first available appointment due to rescheduling nurse visit. Please call patient at [phone]. Thanks KB

## 2024-04-30 NOTE — TELEPHONE ENCOUNTER
----- Message from Ines Gaming MA sent at 4/30/2024 11:36 AM CDT -----  Regarding: Rt Call  Type:  Patient Returning Call    Who Called: Pt     Who Left Message for Patient: Jimmy    Does the patient know what this is regarding?:    Would the patient rather a call back or a response via [facility]?  Both     Best Call Back Number:  [phone]  Additional Information:    Thank you.

## 2024-05-02 ENCOUNTER — CLINICAL SUPPORT (OUTPATIENT)
Dept: FAMILY MEDICINE | Facility: CLINIC | Age: 34
End: 2024-05-02
Payer: COMMERCIAL

## 2024-05-02 DIAGNOSIS — Z23 IMMUNIZATION DUE: Primary | ICD-10-CM

## 2024-05-02 PROCEDURE — 99999 PR PBB SHADOW E&M-EST. PATIENT-LVL II: CPT | Mod: PBBFAC,,,

## 2024-05-02 PROCEDURE — 90471 IMMUNIZATION ADMIN: CPT | Mod: S$GLB,,, | Performed by: INTERNAL MEDICINE

## 2024-05-02 PROCEDURE — 90651 9VHPV VACCINE 2/3 DOSE IM: CPT | Mod: S$GLB,,, | Performed by: INTERNAL MEDICINE

## 2024-05-02 RX ORDER — ESCITALOPRAM OXALATE 10 MG/1
10 TABLET ORAL DAILY
Qty: 90 TABLET | Refills: 3 | Status: SHIPPED | OUTPATIENT
Start: 2024-05-02 | End: 2025-05-02

## 2024-05-02 NOTE — PROGRESS NOTES
Patient ID: Rebecca Blankenship is a 33 y.o. female.    Chief Complaint: Medication Management (Entered automatically based on patient selection in Patient Portal.)    The patient initiated a request through [employer] on 4/24/2024 for evaluation and management with a chief complaint of Medication Management (Entered automatically based on patient selection in Patient Portal.)     I evaluated the questionnaire submission on 4/24/24  .    Ohs Peq Evisit Medication    4/24/2024  7:02 PM CDT - Filed by Patient   Do you agree to participate in an E-Visit? Yes   If you have any of the following symptoms, please present to your local emergency room or call 911:  I acknowledge   Medication requests for narcotics will not be addressed via an E-Visit.  Please schedule an appointment. I acknowledge   Are you pregnant, could you be pregnant, or are you breast feeding? None of the above   Do you want to address a new or existing medication? I would like to address a medication I currently take   What is the main issue you would like addressed today? Lexapro   Would you like to change or continue your medication? Continue medication   What medication would you like to continue?  Lexapro   Are you taking it as prescribed? Yes    What medical condition is the  medication intended to treat? Anxiety   Provide any additional information you feel is important. Need a refill.   Please attach any relevant images or files    Are you able to take your vital signs? No          Active Problem List with Overview Notes    Diagnosis Date Noted    Chronic bilateral low back pain without sciatica 12/22/2023     -chronic, stable  -remains on PRN NSAIDs      Anxiety 03/29/2023     -chronic, worsening over the past week  -has previously tried Zoloft and Vistaril which were ineffective  -trial of Buspar  -will consider SSRI/SNRI if symptoms persist  -discussed anxiety condition course  -discussed SSRI/SNRI as first-line treatment for this  condition  -discussed risk of discontinuing this medication without tapering  -patient was educated, advised of side effects, and all questions were answered. Patient voiced understanding  -patient will follow up routinely and notify us if having any side effects or worsening or persistent symptoms. ER precautions were given.      Primary insomnia 03/29/2023     -chronic, likely due to anxiety  -start Trazodone 50 mg qhs  -has tried multiple OTC medication with minimal benefit  -discussed importance of good sleep hygiene practices        Mild major depression, single episode 12/18/2020     -in remission  -no longer on medications      Generalized anxiety disorder with panic attacks 12/18/2020    Osteoarthritis of both hips 09/17/2019    Prediabetes 12/16/2015     Lab Results   Component Value Date    HGBA1C 5.3 02/03/2023   -currently on GLP1a  -due for repeat A1C        Androgen-dependent hirsutism 12/16/2015    Gastroesophageal reflux disease 11/20/2015     -symptoms controlled with daily PPI  -denies alarm symptoms, such as dysphagia, weight loss or N/V  -continue lifestyle modification with avoidance of acidic foods, caffeine, late night eating      Morbid obesity with BMI of 50.0-59.9, adult      General weight loss/lifestyle modification strategies discussed (elicit support from others; identify saboteurs; non-food rewards, etc).  Informal exercise measures discussed, e.g. taking stairs instead of elevator.  Regular aerobic exercise program discussed.  Doing well with GLP1a          Recent Labs Obtained:  No visits with results within 7 Day(s) from this visit.   Latest known visit with results is:   Lab Visit on 04/02/2024   Component Date Value Ref Range Status    Hemoglobin A1C 04/02/2024 5.3  4.0 - 5.6 % Final    Comment: ADA Screening Guidelines:  5.7-6.4%  Consistent with prediabetes  >or=6.5%  Consistent with diabetes    High levels of fetal hemoglobin interfere with the HbA1C  assay. Heterozygous  hemoglobin variants (HbS, HgC, etc)do  not significantly interfere with this assay.   However, presence of multiple variants may affect accuracy.      Estimated Avg Glucose 04/02/2024 105  68 - 131 mg/dL Final       Encounter Diagnosis   Name Primary?    Mild major depression, single episode Yes        No orders of the defined types were placed in this encounter.     Medications Ordered This Encounter   Medications    EScitalopram oxalate (LEXAPRO) 10 MG tablet     Sig: Take 1 tablet (10 mg total) by mouth once daily.     Dispense:  90 tablet     Refill:  3        E-Visit Time Tracking:

## 2024-05-02 NOTE — TELEPHONE ENCOUNTER
I have signed for the following orders AND/OR meds.  Please call the patient and ask the patient to schedule the testing AND/OR inform about any medications that were sent. Medications have been sent to pharmacy listed below      No orders of the defined types were placed in this encounter.      Medications Ordered This Encounter   Medications    hpv vaccine,9-josafat (GARDASIL 9) vaccine 0.5 mL    varicella-zoster gE vac,2 of 2 SusR 0.5 mL     Dispense:  0.5 mL     Refill:  0         [facility] [address] [place] - [address] [facility]  [address] [facility] [address]  Phone: [phone] Fax: [phone]    [address] [facility]  [address] [facility] [address]  Phone: [phone] Fax: [phone]    [facility] [place] - [address] [facility]  [address] [facility] [address]  Phone: [phone] Fax: [phone]

## 2024-05-13 DIAGNOSIS — E66.01 MORBID OBESITY WITH BMI OF 50.0-59.9, ADULT: ICD-10-CM

## 2024-05-13 RX ORDER — PHENTERMINE HYDROCHLORIDE 37.5 MG/1
37.5 TABLET ORAL
Qty: 30 TABLET | Refills: 2 | Status: SHIPPED | OUTPATIENT
Start: 2024-05-13 | End: 2024-08-11

## 2024-07-01 ENCOUNTER — TELEPHONE (OUTPATIENT)
Dept: GASTROENTEROLOGY | Facility: CLINIC | Age: 34
End: 2024-07-01

## 2024-07-01 ENCOUNTER — OFFICE VISIT (OUTPATIENT)
Dept: GASTROENTEROLOGY | Facility: CLINIC | Age: 34
End: 2024-07-01
Payer: COMMERCIAL

## 2024-07-01 ENCOUNTER — TELEPHONE (OUTPATIENT)
Dept: SURGERY | Facility: HOSPITAL | Age: 34
End: 2024-07-01
Payer: COMMERCIAL

## 2024-07-01 VITALS — BODY MASS INDEX: 54.53 KG/M2 | HEIGHT: 59 IN | WEIGHT: 270.5 LBS

## 2024-07-01 DIAGNOSIS — R11.0 NAUSEA: ICD-10-CM

## 2024-07-01 DIAGNOSIS — R10.10 PAIN OF UPPER ABDOMEN: Primary | ICD-10-CM

## 2024-07-01 DIAGNOSIS — K59.09 INTERMITTENT CONSTIPATION: ICD-10-CM

## 2024-07-01 DIAGNOSIS — R12 HEARTBURN: ICD-10-CM

## 2024-07-01 PROCEDURE — 1159F MED LIST DOCD IN RCRD: CPT | Mod: CPTII,S$GLB,, | Performed by: NURSE PRACTITIONER

## 2024-07-01 PROCEDURE — 3044F HG A1C LEVEL LT 7.0%: CPT | Mod: CPTII,S$GLB,, | Performed by: NURSE PRACTITIONER

## 2024-07-01 PROCEDURE — 99203 OFFICE O/P NEW LOW 30 MIN: CPT | Mod: S$GLB,,, | Performed by: NURSE PRACTITIONER

## 2024-07-01 PROCEDURE — 99999 PR PBB SHADOW E&M-EST. PATIENT-LVL III: CPT | Mod: PBBFAC,,, | Performed by: NURSE PRACTITIONER

## 2024-07-01 PROCEDURE — 3008F BODY MASS INDEX DOCD: CPT | Mod: CPTII,S$GLB,, | Performed by: NURSE PRACTITIONER

## 2024-07-01 PROCEDURE — 1160F RVW MEDS BY RX/DR IN RCRD: CPT | Mod: CPTII,S$GLB,, | Performed by: NURSE PRACTITIONER

## 2024-07-01 RX ORDER — PANTOPRAZOLE SODIUM 40 MG/1
40 TABLET, DELAYED RELEASE ORAL
Qty: 30 TABLET | Refills: 1 | Status: SHIPPED | OUTPATIENT
Start: 2024-07-01

## 2024-07-01 NOTE — PATIENT INSTRUCTIONS
"Stomach Ache and Stomach Upset   The Basics   Written by the doctors and editors at [facility]   What happens when you have a stomach ache? -- When you have a stomach ache, you have pain or discomfort in your belly. Sometimes that's the only symptom you have. Other times, you can have other symptoms such as:  Burning in your chest known as heartburn  Burping  Bloating (feeling as though your belly is filled with air)  Feeling full too quickly when you start eating  Should I see a doctor or nurse about my stomach ache? -- Most people do not need to see a doctor or nurse for a stomach ache. But you should see your doctor or nurse if:  You have bloody bowel movements, diarrhea, or vomiting  Your pain is severe and lasts more than an hour or comes and goes for more than 24 hours  You cannot eat or drink for hours  You have a fever higher than 102°F (39°C)  You lose a lot of weight without trying to, or lose interest in food  What causes stomach aches? -- In some cases, stomach aches are caused by a specific problem, such as a stomach ulcer (a sore on the inside of the stomach) or a condition called "diverticulitis," in which small pouches in your large intestine get infected. But in some cases, doctors and nurses do not know what causes stomach aches or the other symptoms that happen with them. Even so, doctors and nurses can usually treat the symptoms of stomach ache.  What treatments help with stomach symptoms? -- If your symptoms are caused by a specific problem, such as an ulcer, treatments for that problem will likely relieve your symptoms. But if your doctor or nurse does not know what is causing your pain, he or she might recommend medicines that reduce the amount of acid in your stomach. These medicines often relieve stomach ache and the symptoms that come with it. Some of these medicines are available without a prescription.  Can I do anything on my own to prevent stomach ache? -- Yes, the foods you eat and the " "way you eat them can have a big effect on whether or not you feel pain.  To lower your chances of getting a stomach ache:  Avoid fatty foods, such as red meat, butter, fried foods, and cheese  Eat a bunch of small meals each day, rather than two or three big meals  Stay away from foods that seem to make your symptoms worse  Avoid taking over-the-counter medicines that seem to make your symptoms worse - Examples include aspirin or ibuprofen (sample brand names: Advil, Motrin).  Some people - especially kids - sometimes get a stomach ache after drinking milk or eating cheese, ice cream, or other foods that have milk in them. They have a problem called "lactose intolerance," which means that they cannot fully break down foods that have milk in them.  People with lactose intolerance can avoid problems caused by milk if they take a medicine called lactase. Lactase (sample brand name: Lactaid), helps your body break down milk. Some foods come with it already added.  If your stomach ache seems to be related to constipation, meaning that you do not have enough bowel movements, you might need more fiber or a medicine called a laxative. (Laxatives are medicines that increase the number of bowel movements you have.)  Taking in a lot of fiber helps to increase the number of bowel movements you have. You can get more fiber by:  Eating plenty of fruits, vegetables, and whole grains  Taking fiber pills, powders, or wafers  Is a stomach ache the same for children as it is for adults? -- In general, yes. Children get stomach aches for the same reasons that adults do. As with adults, doctors and nurses often do not know what causes stomach pain in children. But in children, stomach pain is often triggered by stress or anxiety. For them, it's especially important to pay attention to psychological or emotional problems that might be making pain worse.  All topics are updated as new evidence becomes available and our peer review process " is complete.  This topic retrieved from [employer] on: Sep 21, 2021.  Topic 55671 Version 7.0  Release: 29.4.2 - C29.263  © 2021 UpToDate, Inc. and/or its affiliates. All rights reserved.  Consumer Information Use and Disclaimer   This information is not specific medical advice and does not replace information you receive from your health care provider. This is only a brief summary of general information. It does NOT include all information about conditions, illnesses, injuries, tests, procedures, treatments, therapies, discharge instructions or life-style choices that may apply to you. You must talk with your health care provider for complete information about your health and treatment options. This information should not be used to decide whether or not to accept your health care provider's advice, instructions or recommendations. Only your health care provider has the knowledge and training to provide advice that is right for you. The use of this information is governed by the [employer] End User License Agreement, available at https://www.EVRGR/en/solutions/[employer]/about/leelee.The use of [employer] content is governed by the [employer] Terms of Use. ©2021 UpToDate, Inc. All rights reserved.  Copyright   © 2021 UpToDate, Inc. and/or its affiliates. All rights reserved. Stomach Ache and Stomach Upset   The Basics   Written by the doctors and editors at [employer]   What happens when you have a stomach ache? -- When you have a stomach ache, you have pain or discomfort in your belly. Sometimes that's the only symptom you have. Other times, you can have other symptoms such as:  Burning in your chest known as heartburn  Burping  Bloating (feeling as though your belly is filled with air)  Feeling full too quickly when you start eating  Should I see a doctor or nurse about my stomach ache? -- Most people do not need to see a doctor or nurse for a stomach ache. But you should see your doctor or nurse if:  You have  "bloody bowel movements, diarrhea, or vomiting  Your pain is severe and lasts more than an hour or comes and goes for more than 24 hours  You cannot eat or drink for hours  You have a fever higher than 102°F (39°C)  You lose a lot of weight without trying to, or lose interest in food  What causes stomach aches? -- In some cases, stomach aches are caused by a specific problem, such as a stomach ulcer (a sore on the inside of the stomach) or a condition called "diverticulitis," in which small pouches in your large intestine get infected. But in some cases, doctors and nurses do not know what causes stomach aches or the other symptoms that happen with them. Even so, doctors and nurses can usually treat the symptoms of stomach ache.  What treatments help with stomach symptoms? -- If your symptoms are caused by a specific problem, such as an ulcer, treatments for that problem will likely relieve your symptoms. But if your doctor or nurse does not know what is causing your pain, he or she might recommend medicines that reduce the amount of acid in your stomach. These medicines often relieve stomach ache and the symptoms that come with it. Some of these medicines are available without a prescription.  Can I do anything on my own to prevent stomach ache? -- Yes, the foods you eat and the way you eat them can have a big effect on whether or not you feel pain.  To lower your chances of getting a stomach ache:  Avoid fatty foods, such as red meat, butter, fried foods, and cheese  Eat a bunch of small meals each day, rather than two or three big meals  Stay away from foods that seem to make your symptoms worse  Avoid taking over-the-counter medicines that seem to make your symptoms worse - Examples include aspirin or ibuprofen (sample brand names: Advil, Motrin).  Some people - especially kids - sometimes get a stomach ache after drinking milk or eating cheese, ice cream, or other foods that have milk in them. They have a " "problem called "lactose intolerance," which means that they cannot fully break down foods that have milk in them.  People with lactose intolerance can avoid problems caused by milk if they take a medicine called lactase. Lactase (sample brand name: Lactaid), helps your body break down milk. Some foods come with it already added.  If your stomach ache seems to be related to constipation, meaning that you do not have enough bowel movements, you might need more fiber or a medicine called a laxative. (Laxatives are medicines that increase the number of bowel movements you have.)  Taking in a lot of fiber helps to increase the number of bowel movements you have. You can get more fiber by:  Eating plenty of fruits, vegetables, and whole grains  Taking fiber pills, powders, or wafers  Is a stomach ache the same for children as it is for adults? -- In general, yes. Children get stomach aches for the same reasons that adults do. As with adults, doctors and nurses often do not know what causes stomach pain in children. But in children, stomach pain is often triggered by stress or anxiety. For them, it's especially important to pay attention to psychological or emotional problems that might be making pain worse.  All topics are updated as new evidence becomes available and our peer review process is complete.  This topic retrieved from [employer] on: Sep 21, 2021.  Topic 39441 Version 7.0  Release: 29.4.2 - C29.263  © 2021 UpToDate, Inc. and/or its affiliates. All rights reserved.  Consumer Information Use and Disclaimer   This information is not specific medical advice and does not replace information you receive from your health care provider. This is only a brief summary of general information. It does NOT include all information about conditions, illnesses, injuries, tests, procedures, treatments, therapies, discharge instructions or life-style choices that may apply to you. You must talk with your health care provider for " complete information about your health and treatment options. This information should not be used to decide whether or not to accept your health care provider's advice, instructions or recommendations. Only your health care provider has the knowledge and training to provide advice that is right for you. The use of this information is governed by the [employer] End User License Agreement, available at https://www.foc.us/en/solutions/[employer]/about/leelee.The use of [facility] content is governed by the [facility] Terms of Use. ©2021 [employer]. All rights reserved.  Copyright   © 2021 UpToDate, Inc. and/or its affiliates. All rights reserved.

## 2024-07-01 NOTE — TELEPHONE ENCOUNTER
Pt is scheduled for EGD on 7/3. BMI is 54.64. Per anesthesia guidelines, this pt is not suitable for outpatient setting and will need to be scheduled at hospital. Please reach out to pt to reschedule. Thank you.

## 2024-07-01 NOTE — TELEPHONE ENCOUNTER
----- Message from Mariel Walden sent at 7/1/2024 10:36 AM CDT -----  Type: Needs Medical Advice  Who Called:  pt  Best Call Back Number: [phone]    Additional Information: Pt is calling the office back from missed call from the nurse.Please call back and advise.

## 2024-07-01 NOTE — TELEPHONE ENCOUNTER
Called and spoke with the patient, patient's procedure rescheduled with the patient for 07/05/2024 at [facility] instead of the surgery center 07/03 due to BMI, Patient verbalized understanding of this.

## 2024-07-01 NOTE — PROGRESS NOTES
Subjective:       Patient ID: Rebecca Blankenship is a 33 y.o. female Body mass index is 54.64 kg/m².    Chief Complaint: Abdominal Pain and Gas    This patient is new to me.     Abdominal Pain  This is a chronic problem. Episode onset: started at least since 12/2023; has been seen for it several times in the ER for it; stopped ozempic in 2/2024 and symptoms have improved but never fully resolved; told by ER doctor that the enteritis was likely from her taking laxatives at the time. Episode frequency: at least one week a month, mostly at night when it occurs. The problem has been gradually improving. The pain is located in the periumbilical region, epigastric region and LUQ. The pain is at a severity of 3/10 (currently). The abdominal pain radiates to the back. Associated symptoms include belching, constipation (occasional; bowel movements are currently once daily of formed stool), flatus and nausea. Pertinent negatives include no diarrhea, dysuria, fever, frequency, hematochezia, melena, vomiting or weight loss (has been trying to lose weight; taking adipex PRN- has not taken recently). She has tried proton pump inhibitors (aciphex 20 mg once daily (been on for several years but less ineffective lately); PAST: levsin) for the symptoms. Prior diagnostic workup includes CT scan. Her past medical history is significant for GERD (occurring daily). There is no history of Crohn's disease, gallstones, pancreatitis or ulcerative colitis.     Review of Systems   Constitutional:  Negative for appetite change, chills, fatigue, fever and weight loss (has been trying to lose weight; taking adipex PRN- has not taken recently).        Taking ibuprofen PRN- takes 3-4 times a month for headache or menstrual cramps   HENT:  Negative for sore throat and trouble swallowing.    Respiratory:  Negative for cough, choking and shortness of breath.    Cardiovascular:  Negative for chest pain.   Gastrointestinal:  Positive for abdominal  pain, constipation (occasional; bowel movements are currently once daily of formed stool), flatus and nausea. Negative for anal bleeding, blood in stool, diarrhea, hematochezia, melena, rectal pain and vomiting.   Genitourinary:  Negative for difficulty urinating, dysuria, flank pain and frequency.   Neurological:  Negative for weakness.       Patient's last menstrual period was 06/07/2024.  Past Medical History:   Diagnosis Date    Fatty liver     Gastroesophageal reflux disease 11/20/2015    Hepatomegaly     Insulin resistance     Iron deficiency anemia     Morbid obesity with BMI of 50.0-59.9, adult     Oligomenorrhea      History reviewed. No pertinent surgical history.  Family History   Problem Relation Name Age of Onset    Fibroids Mother      Breast cancer Maternal Aunt Tip     Cancer Maternal Aunt Tip         Breast    Aortic aneurysm Maternal Aunt Several     Heart disease Maternal Aunt Several     Coronary artery disease Maternal Aunt          S/P stent    Hypertension Maternal Aunt Carmen     Stomach cancer Maternal Uncle      Stomach cancer Maternal Uncle Several     Drug abuse Maternal Uncle Several         Mostly all druga    Heart attack Maternal Uncle      Coronary artery disease Maternal Uncle          S/P stent    Stroke Maternal Uncle Pj     Hypertension Maternal Uncle Hardik     Heart disease Maternal Uncle Many     Heart failure Maternal Grandmother Pasco     Stroke Maternal Grandmother Pasco     Hypertension Maternal Grandmother Pasco     Kidney disease Maternal Grandmother Pasco     Heart disease Maternal Grandmother Pasco     Colon cancer Paternal Grandfather Hammad     Esophageal cancer Neg Hx       Social History     Tobacco Use    Smoking status: Never    Smokeless tobacco: Never   Substance Use Topics    Alcohol use: Yes     Alcohol/week: 0.0 - 1.0 standard drinks of alcohol     Comment: occ     Drug use: Not Currently     Types: Marijuana     Wt Readings from Last 10 Encounters:    07/01/24 122.7 kg (270 lb 8.1 oz)   02/20/24 116.6 kg (257 lb)   10/04/23 120.2 kg (265 lb)   08/29/23 122 kg (268 lb 14.4 oz)   08/05/22 122.6 kg (270 lb 4.8 oz)   10/23/20 134.3 kg (296 lb)   10/10/19 122.8 kg (270 lb 11.6 oz)   09/03/19 121 kg (266 lb 12.1 oz)   10/16/18 122 kg (268 lb 15.4 oz)   07/16/18 126.5 kg (278 lb 14.1 oz)     Lab Results   Component Value Date    WBC 0-5 02/15/2024    HGB 14.2 10/04/2023    HCT 44.6 10/04/2023    MCV 92 10/04/2023     10/04/2023     CMP  Sodium   Date Value Ref Range Status   02/18/2024 138 136 - 144 mmol/L Final   10/04/2023 138 136 - 145 mmol/L Final     Potassium   Date Value Ref Range Status   02/18/2024 3.6 3.6 - 5.1 mmol/L Final   10/04/2023 4.2 3.5 - 5.1 mmol/L Final     Chloride   Date Value Ref Range Status   10/04/2023 104 95 - 110 mmol/L Final     CO2   Date Value Ref Range Status   02/18/2024 24 22 - 32 mmol/L Final   10/04/2023 23 23 - 29 mmol/L Final     Glucose   Date Value Ref Range Status   10/04/2023 66 (L) 70 - 110 mg/dL Final     BUN   Date Value Ref Range Status   10/04/2023 10 6 - 20 mg/dL Final     Blood Urea Nitrogen   Date Value Ref Range Status   02/18/2024 6 (L) 8 - 20 mg/dL Final     Creatinine   Date Value Ref Range Status   02/18/2024 0.70 0.60 - 1.10 mg/dL Final   10/04/2023 0.7 0.5 - 1.4 mg/dL Final     Calcium   Date Value Ref Range Status   02/18/2024 8.8 (L) 8.9 - 10.3 mg/dL Final   10/04/2023 9.7 8.7 - 10.5 mg/dL Final     Total Protein   Date Value Ref Range Status   02/18/2024 7.0 6.1 - 7.9 g/dL Final   10/04/2023 8.2 6.0 - 8.4 g/dL Final     Albumin   Date Value Ref Range Status   02/18/2024 3.7 3.5 - 4.8 g/dL Final   10/04/2023 3.6 3.5 - 5.2 g/dL Final     Total Bilirubin   Date Value Ref Range Status   02/18/2024 1.6 0.4 - 2.0 mg/dL Final   10/04/2023 1.1 (H) 0.1 - 1.0 mg/dL Final     Comment:     For infants and newborns, interpretation of results should be based  on gestational age, weight and in agreement with  clinical  observations.    Premature Infant recommended reference ranges:  Up to 24 hours.............<8.0 mg/dL  Up to 48 hours............<12.0 mg/dL  3-5 days..................<15.0 mg/dL  6-29 days.................<15.0 mg/dL       Alkaline Phosphatase   Date Value Ref Range Status   02/18/2024 132 (H) 28 - 116 U/L Final   10/04/2023 133 55 - 135 U/L Final     AST   Date Value Ref Range Status   02/18/2024 22 10 - 34 U/L Final   10/04/2023 35 10 - 40 U/L Final     ALT   Date Value Ref Range Status   02/18/2024 28 5 - 41 U/L Final   10/04/2023 44 10 - 44 U/L Final     Anion Gap   Date Value Ref Range Status   02/18/2024 9 7 - 16 mmol/L Final   10/04/2023 11 8 - 16 mmol/L Final     eGFR if    Date Value Ref Range Status   11/03/2020 >60.0 >60 mL/min/1.73 m^2 Final     eGFR if non    Date Value Ref Range Status   11/03/2020 >60.0 >60 mL/min/1.73 m^2 Final     Comment:     Calculation used to obtain the estimated glomerular filtration  rate (eGFR) is the CKD-EPI equation.        Lab Results   Component Value Date    TSH 3.278 10/04/2023   2/18/2024 lipase WNL  2/17/2024 amylase WNL    Reviewed prior medical records including radiology report of 2/18/2024 CT abdomen pelvis; 7/19/2028 pelvic ultrasound; & endoscopy history (see surgical history/procedures).    Objective:      Physical Exam  Vitals and nursing note reviewed.   Constitutional:       General: She is not in acute distress.     Appearance: Normal appearance. She is well-developed. She is not diaphoretic.   HENT:      Mouth/Throat:      Lips: Pink. No lesions.      Mouth: Mucous membranes are moist. No oral lesions.      Tongue: No lesions.      Pharynx: Oropharynx is clear. No pharyngeal swelling or posterior oropharyngeal erythema.   Eyes:      General: No scleral icterus.     Conjunctiva/sclera: Conjunctivae normal.   Pulmonary:      Effort: Pulmonary effort is normal. No respiratory distress.      Breath sounds: Normal  breath sounds. No wheezing.   Abdominal:      General: Bowel sounds are normal. There is no distension or abdominal bruit.      Palpations: Abdomen is soft. Abdomen is not rigid. There is no mass.      Tenderness: There is no abdominal tenderness. There is no guarding or rebound. Negative signs include Marquez's sign and McBurney's sign.   Skin:     General: Skin is warm and dry.      Coloration: Skin is not jaundiced or pale.      Findings: No erythema or rash.   Neurological:      Mental Status: She is alert and oriented to person, place, and time.   Psychiatric:         Behavior: Behavior normal.         Thought Content: Thought content normal.         Judgment: Judgment normal.         Assessment:       1. Pain of upper abdomen    2. Heartburn    3. Nausea    4. Intermittent constipation        Plan:       Pain of upper abdomen  -     US Abdomen Limited; Future; Expected date: 07/01/2024  -     Hepatic Function Panel; Future; Expected date: 07/01/2024  -  START   pantoprazole (PROTONIX) 40 MG tablet; Take 1 tablet (40 mg total) by mouth before breakfast.  Dispense: 30 tablet; Refill: 1  - schedule EGD, discussed procedure with patient, including risks and benefits, patient verbalized understanding  - avoid/minimize use of NSAIDs- since they can cause GI upset, bleeding and/or ulcers. If NSAID must be taken, recommend take with food.    Heartburn  -  START   pantoprazole (PROTONIX) 40 MG tablet; Take 1 tablet (40 mg total) by mouth before breakfast.  Dispense: 30 tablet; Refill: 1  - schedule EGD, discussed procedure with patient, including risks and benefits, patient verbalized understanding    Nausea  -  START   pantoprazole (PROTONIX) 40 MG tablet; Take 1 tablet (40 mg total) by mouth before breakfast.  Dispense: 30 tablet; Refill: 1  - schedule EGD, discussed procedure with patient, including risks and benefits, patient verbalized understanding    Intermittent constipation  Recommend daily exercise as  tolerated, adequate water intake (six 8-oz glasses of water daily), and high fiber diet. OTC fiber supplements are recommended if diet does not reach daily fiber goal (20-30 grams daily), such as Metamucil, Citrucel, or FiberCon (take as directed, separate from other oral medications by >2 hours).  -Recommend taking an OTC stool softener such as Colace as directed to avoid hard stools and straining with bowel movements PRN  -Recommend trying OTC MiraLax once daily (17g PO) as directed PRN constipation  -If still no improvement with these measures, call/follow-up    Follow up in about 1 month (around 8/1/2024), or if symptoms worsen or fail to improve.      If no improvement in symptoms or symptoms worsen, call/follow-up at clinic or go to ER.        37 minutes of total time spent on the encounter, which includes face to face time and non-face to face time preparing to see the patient (e.g., review of tests), Obtaining and/or reviewing separately obtained history, Documenting clinical information in the electronic or other health record, Independently interpreting results (not separately reported) and communicating results to the patient/family/caregiver, or Care coordination (not separately reported).

## 2024-07-02 ENCOUNTER — HOSPITAL ENCOUNTER (OUTPATIENT)
Dept: RADIOLOGY | Facility: HOSPITAL | Age: 34
Discharge: HOME | End: 2024-07-02
Attending: NURSE PRACTITIONER
Payer: COMMERCIAL

## 2024-07-02 ENCOUNTER — TELEPHONE (OUTPATIENT)
Dept: GASTROENTEROLOGY | Facility: CLINIC | Age: 34
End: 2024-07-02
Payer: COMMERCIAL

## 2024-07-02 ENCOUNTER — PATIENT MESSAGE (OUTPATIENT)
Dept: GASTROENTEROLOGY | Facility: CLINIC | Age: 34
End: 2024-07-02
Payer: COMMERCIAL

## 2024-07-02 DIAGNOSIS — R10.10 PAIN OF UPPER ABDOMEN: ICD-10-CM

## 2024-07-02 DIAGNOSIS — K80.20 CALCULUS OF GALLBLADDER WITHOUT CHOLECYSTITIS WITHOUT OBSTRUCTION: Primary | ICD-10-CM

## 2024-07-02 PROCEDURE — 76705 ECHO EXAM OF ABDOMEN: CPT | Mod: 26,,, | Performed by: RADIOLOGY

## 2024-07-02 PROCEDURE — 76705 ECHO EXAM OF ABDOMEN: CPT | Mod: TC,PO

## 2024-07-02 NOTE — TELEPHONE ENCOUNTER
----- Message from Art Perez sent at 7/2/2024  1:51 PM CDT -----  Type:  Needs Medical Advice    Who Called: Pt      Would the patient rather a call back or a response via [facility]? Call back    Best Call Back Number: [phone]      Additional Information: sts she got the results in the portal but really would like to talk to someone about them.   Please advise -- Thank you

## 2024-07-05 DIAGNOSIS — Z00.00 ENCOUNTER FOR ANNUAL HEALTH EXAMINATION: Primary | ICD-10-CM

## 2024-07-05 DIAGNOSIS — K80.20 CALCULUS OF GALLBLADDER WITHOUT CHOLECYSTITIS WITHOUT OBSTRUCTION: ICD-10-CM

## 2024-07-05 DIAGNOSIS — R10.10 PAIN OF UPPER ABDOMEN: ICD-10-CM

## 2024-07-05 NOTE — TELEPHONE ENCOUNTER
I see EGD is scheduled for today.  She can try:  - recommend OTC simethicone as directed, such as Phazyme or Gas-x  - recommend low gas diet: Reduce or eliminate these foods from your diet: Broccoli, Cauliflower, Aline sprouts, Cabbage, Cooked dried beans, Carbonated beverages (sparkling water, soda, beer, champagne)  Other Causes Of Excess Gas Include:   1) EATING TOO FAST or TALKING WHILE YOU CHEW may cause you to swallow air. This increases the amount of gas in the stomach and may worsen your symptoms.  --> Chew each mouthful completely before swallowing. Take your time.  2) OVEREATING may increase the feeling of being bloated and cause more gas.  --> When you are full, stop eating.  3) CONSTIPATION can increase the amount of normal intestinal gas.  --> Avoid constipation by increasing the amount of fiber in your diet by including whole cereal grains, fresh vegetables (except those in the above list) and fresh fruits. High-fiber foods absorb water and carry it out of the body. When increasing the amount of fiber in your diet, you also need to increase the amount of water that you drink. You should drink at least eight 8-ounce glasses of water (two quarts) per day.  Thanks  CAMILA

## 2024-07-09 ENCOUNTER — PATIENT MESSAGE (OUTPATIENT)
Dept: GASTROENTEROLOGY | Facility: CLINIC | Age: 34
End: 2024-07-09
Payer: COMMERCIAL

## 2024-07-10 ENCOUNTER — E-VISIT (OUTPATIENT)
Dept: FAMILY MEDICINE | Facility: CLINIC | Age: 34
End: 2024-07-10
Payer: COMMERCIAL

## 2024-07-10 ENCOUNTER — PATIENT MESSAGE (OUTPATIENT)
Dept: FAMILY MEDICINE | Facility: CLINIC | Age: 34
End: 2024-07-10
Payer: COMMERCIAL

## 2024-07-10 DIAGNOSIS — N39.0 URINARY TRACT INFECTION WITHOUT HEMATURIA, SITE UNSPECIFIED: Primary | ICD-10-CM

## 2024-07-10 DIAGNOSIS — R51.9 NONINTRACTABLE HEADACHE, UNSPECIFIED CHRONICITY PATTERN, UNSPECIFIED HEADACHE TYPE: ICD-10-CM

## 2024-07-10 RX ORDER — NAPROXEN 500 MG/1
500 TABLET ORAL 2 TIMES DAILY PRN
Qty: 14 TABLET | Refills: 0 | Status: SHIPPED | OUTPATIENT
Start: 2024-07-10 | End: 2024-07-17

## 2024-07-10 RX ORDER — PHENAZOPYRIDINE HYDROCHLORIDE 200 MG/1
200 TABLET, FILM COATED ORAL 3 TIMES DAILY PRN
Qty: 9 TABLET | Refills: 0 | Status: SHIPPED | OUTPATIENT
Start: 2024-07-10 | End: 2024-07-13

## 2024-07-10 RX ORDER — NITROFURANTOIN 25; 75 MG/1; MG/1
100 CAPSULE ORAL 2 TIMES DAILY
Qty: 10 CAPSULE | Refills: 0 | Status: SHIPPED | OUTPATIENT
Start: 2024-07-10 | End: 2024-07-15

## 2024-07-10 NOTE — PROGRESS NOTES
Patient ID: Rebecca Blankenship is a 33 y.o. female.    Chief Complaint: Urinary Tract Infection (Entered automatically based on patient selection in [employer].)          274}  The patient initiated a request through [employer] on 7/10/2024 for evaluation and management with a chief complaint of Urinary Tract Infection (Entered automatically based on patient selection in [employer].)     I evaluated the questionnaire submission on 07/10/2024 .    Total Time (in minutes): 11     [facility] Peq Evisit Uti Questionnaire    7/10/2024 10:17 AM CDT - Filed by Patient   Do you agree to participate in an E-Visit? Yes   If you have any of the following symptoms, please present to your local emergency room or call 911:  I acknowledge   Choose the state of your primary residence [place]   Are you pregnant, could you be pregnant, or are you breast feeding? None of the above   What is the main issue you would like addressed today? Excessive peeing   What symptoms do you currently have? Difficulty passing urine   When did your symptoms first appear? 7/9/2024   List what you have done or taken to help your symptoms. I do not have diffuculty peeing or pain: it did not have option for peeing excessively. Took two azo pills last night. I also have a headache.   Please indicate whether you have had the following symptoms during the past 24 hours     Urgent urination (a sudden and uncontrollable urge to urinate) Severe   Frequent urination of small amounts of urine (going to the toilet very often) Moderate   Burning pain when urinating None   Incomplete bladder emptying (still feel like you need to urinate again after urination) Moderate   Pain not associated with urination in the lower abdomen below the belly button) Mild   What does your urine look like? Clear   Blood seen in the urine None   Flank pain (pain in one or both sides of the lower back) None   Abnormal Vaginal Discharge (abnormal amount, color and/or odor) None   Discharge from  the urethra (urinary opening) without urination None   High body temperature/fever? None-<99.5   Please rate how much discomfort you have experience because of the symptoms in the past 24 hours: Moderate   Please indicate how the symptoms have interfered with your every day activities/work in the past 24 hours: Moderate   Please indicate how these symptoms have interfered with your social activities (visiting people, meeting with friends, etc.) in the past 24 hours? Mild   Are you a diabetic? No   Please indicate whether you have the following at the time of completion of this questionnaire: None of the above   Provide any additional information you feel is important.    Please attach any relevant images or files (if you have performed a home test for UTI, please submit a photo of results)    Are you able to take your vital signs? No          Active Problem List with Overview Notes    Diagnosis Date Noted    Upper abdominal pain 07/05/2024    Chronic bilateral low back pain without sciatica 12/22/2023     -chronic, stable  -remains on PRN NSAIDs      Anxiety 03/29/2023     -chronic, worsening over the past week  -has previously tried Zoloft and Vistaril which were ineffective  -trial of Buspar  -will consider SSRI/SNRI if symptoms persist  -discussed anxiety condition course  -discussed SSRI/SNRI as first-line treatment for this condition  -discussed risk of discontinuing this medication without tapering  -patient was educated, advised of side effects, and all questions were answered. Patient voiced understanding  -patient will follow up routinely and notify us if having any side effects or worsening or persistent symptoms. ER precautions were given.      Primary insomnia 03/29/2023     -chronic, likely due to anxiety  -start Trazodone 50 mg qhs  -has tried multiple OTC medication with minimal benefit  -discussed importance of good sleep hygiene practices        Mild major depression, single episode 12/18/2020      -in remission  -no longer on medications      Generalized anxiety disorder with panic attacks 12/18/2020    Osteoarthritis of both hips 09/17/2019    Prediabetes 12/16/2015     Lab Results   Component Value Date    HGBA1C 5.3 02/03/2023   -currently on GLP1a  -due for repeat A1C        Androgen-dependent hirsutism 12/16/2015    Gastroesophageal reflux disease 11/20/2015     -symptoms controlled with daily PPI  -denies alarm symptoms, such as dysphagia, weight loss or N/V  -continue lifestyle modification with avoidance of acidic foods, caffeine, late night eating      Morbid obesity with BMI of 50.0-59.9, adult      General weight loss/lifestyle modification strategies discussed (elicit support from others; identify saboteurs; non-food rewards, etc).  Informal exercise measures discussed, e.g. taking stairs instead of elevator.  Regular aerobic exercise program discussed.  Doing well with GLP1a          Recent Labs Obtained:  Lab Results   Component Value Date    WBC 0-5 02/15/2024    HGB 14.2 10/04/2023    HCT 44.6 10/04/2023    MCV 92 10/04/2023     10/04/2023     02/18/2024    K 3.6 02/18/2024    GLU 66 (L) 10/04/2023    CREATININE 0.70 02/18/2024    EGFRNORACEVR >60.0 10/04/2023    HGBA1C 5.3 04/02/2024      Review of patient's allergies indicates:  No Known Allergies    Encounter Diagnoses   Name Primary?    Urinary tract infection without hematuria, site unspecified Yes    Nonintractable headache, unspecified chronicity pattern, unspecified headache type         No orders of the defined types were placed in this encounter.     Medications Ordered This Encounter   Medications    naproxen (NAPROSYN) 500 MG tablet     Sig: Take 1 tablet (500 mg total) by mouth 2 (two) times daily as needed (pain).     Dispense:  14 tablet     Refill:  0    nitrofurantoin, macrocrystal-monohydrate, (MACROBID) 100 MG capsule     Sig: Take 1 capsule (100 mg total) by mouth 2 (two) times daily. for 5 days     Dispense:   10 capsule     Refill:  0    phenazopyridine (PYRIDIUM) 200 MG tablet     Sig: Take 1 tablet (200 mg total) by mouth 3 (three) times daily as needed for Pain.     Dispense:  9 tablet     Refill:  0        E-Visit Time Tracking:    Day 1 Time (in minutes): 11    Total Time (in minutes): 11      274}

## 2024-07-29 ENCOUNTER — OFFICE VISIT (OUTPATIENT)
Dept: SURGERY | Facility: CLINIC | Age: 34
End: 2024-07-29
Payer: COMMERCIAL

## 2024-07-29 VITALS
TEMPERATURE: 98 F | SYSTOLIC BLOOD PRESSURE: 137 MMHG | DIASTOLIC BLOOD PRESSURE: 89 MMHG | WEIGHT: 273.56 LBS | HEIGHT: 59 IN | HEART RATE: 88 BPM | BODY MASS INDEX: 55.15 KG/M2

## 2024-07-29 DIAGNOSIS — R10.10 PAIN OF UPPER ABDOMEN: ICD-10-CM

## 2024-07-29 DIAGNOSIS — K80.20 CALCULUS OF GALLBLADDER WITHOUT CHOLECYSTITIS WITHOUT OBSTRUCTION: ICD-10-CM

## 2024-07-29 PROCEDURE — 99204 OFFICE O/P NEW MOD 45 MIN: CPT | Mod: S$GLB,,, | Performed by: STUDENT IN AN ORGANIZED HEALTH CARE EDUCATION/TRAINING PROGRAM

## 2024-07-29 PROCEDURE — 3079F DIAST BP 80-89 MM HG: CPT | Mod: CPTII,S$GLB,, | Performed by: STUDENT IN AN ORGANIZED HEALTH CARE EDUCATION/TRAINING PROGRAM

## 2024-07-29 PROCEDURE — 1159F MED LIST DOCD IN RCRD: CPT | Mod: CPTII,S$GLB,, | Performed by: STUDENT IN AN ORGANIZED HEALTH CARE EDUCATION/TRAINING PROGRAM

## 2024-07-29 PROCEDURE — 3075F SYST BP GE 130 - 139MM HG: CPT | Mod: CPTII,S$GLB,, | Performed by: STUDENT IN AN ORGANIZED HEALTH CARE EDUCATION/TRAINING PROGRAM

## 2024-07-29 PROCEDURE — 99999 PR PBB SHADOW E&M-EST. PATIENT-LVL IV: CPT | Mod: PBBFAC,,, | Performed by: STUDENT IN AN ORGANIZED HEALTH CARE EDUCATION/TRAINING PROGRAM

## 2024-07-29 PROCEDURE — 3044F HG A1C LEVEL LT 7.0%: CPT | Mod: CPTII,S$GLB,, | Performed by: STUDENT IN AN ORGANIZED HEALTH CARE EDUCATION/TRAINING PROGRAM

## 2024-07-29 PROCEDURE — 3008F BODY MASS INDEX DOCD: CPT | Mod: CPTII,S$GLB,, | Performed by: STUDENT IN AN ORGANIZED HEALTH CARE EDUCATION/TRAINING PROGRAM

## 2024-07-29 RX ORDER — SODIUM CHLORIDE 9 MG/ML
INJECTION, SOLUTION INTRAVENOUS CONTINUOUS
OUTPATIENT
Start: 2024-07-29

## 2024-07-29 RX ORDER — CEFAZOLIN SODIUM 2 G/50ML
2 SOLUTION INTRAVENOUS
OUTPATIENT
Start: 2024-07-29

## 2024-07-29 RX ORDER — PHENIRAMINE MALEATE, PHENYLEPHRINE HCL 17.5; 1 MG/1; MG/1
TABLET ORAL
COMMUNITY
Start: 2024-07-20 | End: 2024-07-29

## 2024-07-29 RX ORDER — AZELASTINE 1 MG/ML
1 SPRAY, METERED NASAL 2 TIMES DAILY
COMMUNITY
Start: 2024-07-20 | End: 2024-07-29

## 2024-07-29 RX ORDER — OFLOXACIN 3 MG/ML
1 SOLUTION/ DROPS OPHTHALMIC ONCE
COMMUNITY
Start: 2024-07-20 | End: 2024-07-29

## 2024-07-29 NOTE — PROGRESS NOTES
History & Physical    Subjective     History of Present Illness:  Patient is a 33 y.o. female presents with vague abdominal pain.  She was referred to me after an ultrasound showed gallstones.  Patient was previously on Ozempic but symptoms started before this.    Chief Complaint   Patient presents with    Consult     Gallbladder       Review of patient's allergies indicates:  No Known Allergies    Current Outpatient Medications   Medication Sig Dispense Refill    MARY ELLEN FE 1/20, 28, 1 mg-20 mcg (21)/75 mg (7) per tablet Take 1 tablet by mouth Daily.      pantoprazole (PROTONIX) 40 MG tablet Take 1 tablet (40 mg total) by mouth before breakfast. 30 tablet 1    EScitalopram oxalate (LEXAPRO) 10 MG tablet Take 1 tablet (10 mg total) by mouth once daily. (Patient not taking: Reported on 7/29/2024) 90 tablet 3    ibuprofen (ADVIL,MOTRIN) 800 MG tablet Take 1 tablet (800 mg total) by mouth 3 (three) times daily as needed for Pain (with food). (Patient not taking: Reported on 7/29/2024) 90 tablet 0     No current facility-administered medications for this visit.       Past Medical History:   Diagnosis Date    Fatty liver     Gastroesophageal reflux disease 11/20/2015    Hepatomegaly     Insulin resistance     Iron deficiency anemia     Morbid obesity with BMI of 50.0-59.9, adult     Oligomenorrhea      Past Surgical History:   Procedure Laterality Date    ESOPHAGOGASTRODUODENOSCOPY N/A 7/5/2024    Procedure: EGD (ESOPHAGOGASTRODUODENOSCOPY);  Surgeon: Ellis Montalvo Jr., MD;  Location: [facility];  Service: Endoscopy;  Laterality: N/A;     Family History   Problem Relation Name Age of Onset    Fibroids Mother      Breast cancer Maternal Aunt Tip     Cancer Maternal Aunt Tip         Breast    Aortic aneurysm Maternal Aunt Several     Heart disease Maternal Aunt Several     Coronary artery disease Maternal Aunt          S/P stent    Hypertension Maternal Aunt Carmen     Stomach cancer Maternal Uncle      Stomach cancer  "Maternal Uncle Several     Drug abuse Maternal Uncle Several         Mostly all druga    Heart attack Maternal Uncle      Coronary artery disease Maternal Uncle          S/P stent    Stroke Maternal Uncle Pj     Hypertension Maternal Uncle Hardik     Heart disease Maternal Uncle Many     Heart failure Maternal Grandmother Natacha     Stroke Maternal Grandmother Natacha     Hypertension Maternal Grandmother Natacha     Kidney disease Maternal Grandmother Natacha     Heart disease Maternal Grandmother Natacha     Colon cancer Paternal Grandfather Hammad     Esophageal cancer Neg Hx       Social History     Tobacco Use    Smoking status: Never    Smokeless tobacco: Never   Substance Use Topics    Alcohol use: Yes     Alcohol/week: 0.0 - 1.0 standard drinks of alcohol     Comment: occ     Drug use: Not Currently        Review of Systems:  Review of Systems   Constitutional:  Negative for fever.   HENT: Negative.     Eyes: Negative.    Respiratory: Negative.     Cardiovascular: Negative.    Gastrointestinal:  Positive for abdominal pain.   Endocrine: Negative.    Genitourinary: Negative.    Musculoskeletal: Negative.    Skin: Negative.    Allergic/Immunologic: Negative.    Neurological: Negative.    Hematological: Negative.    Psychiatric/Behavioral: Negative.            Objective     Vital Signs (Most Recent)  Temp: 97.6 °F (36.4 °C) (07/29/24 0944)  Pulse: 88 (07/29/24 0944)  BP: 137/89 (07/29/24 0944)  4' 11" (1.499 m)  124.1 kg (273 lb 9.5 oz)     Physical Exam:  Physical Exam  Constitutional:       General: She is not in acute distress.     Appearance: Normal appearance. She is obese. She is not ill-appearing, toxic-appearing or diaphoretic.   HENT:      Head: Normocephalic.      Nose: Nose normal.   Eyes:      Conjunctiva/sclera: Conjunctivae normal.   Cardiovascular:      Rate and Rhythm: Normal rate and regular rhythm.   Pulmonary:      Effort: Pulmonary effort is normal.   Abdominal:      Palpations: Abdomen is soft. "   Musculoskeletal:         General: Normal range of motion.      Cervical back: Normal range of motion.   Skin:     General: Skin is warm.   Neurological:      General: No focal deficit present.      Mental Status: She is alert.   Psychiatric:         Mood and Affect: Mood normal.         Laboratory  LFTs are essentially normal    Diagnostic Results:  Ultrasound was reviewed.  There is a small gallstone impacted in the neck of the gallbladder.       Assessment and Plan   33-year-old female with gallstones.  She has a stone that is nonmobile in the neck of the gallbladder.  Patient is morbidly obese with a BMI of 55.    PLAN:  Risks versus benefits of cholecystectomy were discussed.  Given BMI, would recommend proceeding with robotic intervention.  She is at higher risk of operative complications because of this as well.  We also discuss that surgery may fail to alleviate symptoms.  Patient ultimately elected to pursue operative intervention.  Consent was obtained.  Surgery was scheduled for the patient's earliest convenience.

## 2024-07-29 NOTE — PATIENT INSTRUCTIONS
Surgery is scheduled for 8/7/24 arrival time will be given by the the preop nurse.  You may receive two calls from the Preop Nurses one a few days before surgery the second the day before surgery with the arrival time.  The preop nurse will call you from [phone]  Nothing to eat or drink after midnight.  Someone to drive you home if you are same day surgery.    THE PREOP NURSE WILL CALL, SOMETIMES AS LATE AS 4 or 5 PM IN THE AFTERNOON THE DAY BEFORE SURGERY.    Shower the night before and the morning of your procedure with Chlorhexidine Surgical Scrub also known as Hibiclens it can be purchased at most Pharmacy's no prescription needed.    Special Instruction:     Your procedure/surgery is scheduled at the [facility] location formerly known as [facility] at [address] [place].     Contact Serjio Browning LPN for questions are concerns. [phone]

## 2024-08-05 ENCOUNTER — TELEPHONE (OUTPATIENT)
Dept: GASTROENTEROLOGY | Facility: CLINIC | Age: 34
End: 2024-08-05
Payer: COMMERCIAL

## 2024-08-06 ENCOUNTER — ANESTHESIA EVENT (OUTPATIENT)
Dept: SURGERY | Facility: HOSPITAL | Age: 34
End: 2024-08-06
Payer: COMMERCIAL

## 2024-08-07 ENCOUNTER — ANESTHESIA (OUTPATIENT)
Dept: SURGERY | Facility: HOSPITAL | Age: 34
End: 2024-08-07
Payer: COMMERCIAL

## 2024-08-07 ENCOUNTER — HOSPITAL ENCOUNTER (OUTPATIENT)
Facility: HOSPITAL | Age: 34
Discharge: HOME | End: 2024-08-07
Attending: STUDENT IN AN ORGANIZED HEALTH CARE EDUCATION/TRAINING PROGRAM | Admitting: STUDENT IN AN ORGANIZED HEALTH CARE EDUCATION/TRAINING PROGRAM
Payer: COMMERCIAL

## 2024-08-07 VITALS
SYSTOLIC BLOOD PRESSURE: 148 MMHG | HEART RATE: 93 BPM | RESPIRATION RATE: 20 BRPM | DIASTOLIC BLOOD PRESSURE: 83 MMHG | WEIGHT: 273 LBS | HEIGHT: 59 IN | OXYGEN SATURATION: 95 % | BODY MASS INDEX: 55.04 KG/M2 | TEMPERATURE: 98 F

## 2024-08-07 DIAGNOSIS — R10.10 PAIN OF UPPER ABDOMEN: ICD-10-CM

## 2024-08-07 DIAGNOSIS — K80.20 CALCULUS OF GALLBLADDER WITHOUT CHOLECYSTITIS WITHOUT OBSTRUCTION: Primary | ICD-10-CM

## 2024-08-07 DIAGNOSIS — Z01.818 PREOP TESTING: ICD-10-CM

## 2024-08-07 LAB
ANION GAP SERPL CALC-SCNC: 11 MMOL/L (ref 8–16)
B-HCG UR QL: NEGATIVE
BASOPHILS # BLD AUTO: 0.03 K/UL (ref 0–0.2)
BASOPHILS NFR BLD: 0.4 % (ref 0–1.9)
BUN SERPL-MCNC: 10 MG/DL (ref 6–20)
CALCIUM SERPL-MCNC: 9.1 MG/DL (ref 8.7–10.5)
CHLORIDE SERPL-SCNC: 106 MMOL/L (ref 95–110)
CO2 SERPL-SCNC: 19 MMOL/L (ref 23–29)
CREAT SERPL-MCNC: 0.7 MG/DL (ref 0.5–1.4)
CTP QC/QA: YES
DIFFERENTIAL METHOD BLD: NORMAL
EOSINOPHIL # BLD AUTO: 0.1 K/UL (ref 0–0.5)
EOSINOPHIL NFR BLD: 0.9 % (ref 0–8)
ERYTHROCYTE [DISTWIDTH] IN BLOOD BY AUTOMATED COUNT: 13.2 % (ref 11.5–14.5)
EST. GFR  (NO RACE VARIABLE): >60 ML/MIN/1.73 M^2
GLUCOSE SERPL-MCNC: 104 MG/DL (ref 70–110)
HCT VFR BLD AUTO: 38.6 % (ref 37–48.5)
HGB BLD-MCNC: 12.9 G/DL (ref 12–16)
IMM GRANULOCYTES # BLD AUTO: 0.02 K/UL (ref 0–0.04)
IMM GRANULOCYTES NFR BLD AUTO: 0.3 % (ref 0–0.5)
LYMPHOCYTES # BLD AUTO: 2.7 K/UL (ref 1–4.8)
LYMPHOCYTES NFR BLD: 38.5 % (ref 18–48)
MCH RBC QN AUTO: 29.2 PG (ref 27–31)
MCHC RBC AUTO-ENTMCNC: 33.4 G/DL (ref 32–36)
MCV RBC AUTO: 87 FL (ref 82–98)
MONOCYTES # BLD AUTO: 0.4 K/UL (ref 0.3–1)
MONOCYTES NFR BLD: 6 % (ref 4–15)
NEUTROPHILS # BLD AUTO: 3.8 K/UL (ref 1.8–7.7)
NEUTROPHILS NFR BLD: 53.9 % (ref 38–73)
NRBC BLD-RTO: 0 /100 WBC
PLATELET # BLD AUTO: 309 K/UL (ref 150–450)
PMV BLD AUTO: 9.2 FL (ref 9.2–12.9)
POTASSIUM SERPL-SCNC: 3.9 MMOL/L (ref 3.5–5.1)
RBC # BLD AUTO: 4.42 M/UL (ref 4–5.4)
SODIUM SERPL-SCNC: 136 MMOL/L (ref 136–145)
WBC # BLD AUTO: 7.02 K/UL (ref 3.9–12.7)

## 2024-08-07 PROCEDURE — 94799 UNLISTED PULMONARY SVC/PX: CPT

## 2024-08-07 PROCEDURE — 25000003 PHARM REV CODE 250: Performed by: ANESTHESIOLOGY

## 2024-08-07 PROCEDURE — 71000039 HC RECOVERY, EACH ADD'L HOUR: Performed by: STUDENT IN AN ORGANIZED HEALTH CARE EDUCATION/TRAINING PROGRAM

## 2024-08-07 PROCEDURE — 71000016 HC POSTOP RECOV ADDL HR: Performed by: STUDENT IN AN ORGANIZED HEALTH CARE EDUCATION/TRAINING PROGRAM

## 2024-08-07 PROCEDURE — 36415 COLL VENOUS BLD VENIPUNCTURE: CPT | Performed by: ANESTHESIOLOGY

## 2024-08-07 PROCEDURE — 37000008 HC ANESTHESIA 1ST 15 MINUTES: Performed by: STUDENT IN AN ORGANIZED HEALTH CARE EDUCATION/TRAINING PROGRAM

## 2024-08-07 PROCEDURE — 63600175 PHARM REV CODE 636 W HCPCS: Performed by: NURSE ANESTHETIST, CERTIFIED REGISTERED

## 2024-08-07 PROCEDURE — 47562 LAPAROSCOPIC CHOLECYSTECTOMY: CPT | Mod: 22,,, | Performed by: STUDENT IN AN ORGANIZED HEALTH CARE EDUCATION/TRAINING PROGRAM

## 2024-08-07 PROCEDURE — 63600175 PHARM REV CODE 636 W HCPCS: Performed by: STUDENT IN AN ORGANIZED HEALTH CARE EDUCATION/TRAINING PROGRAM

## 2024-08-07 PROCEDURE — 25000003 PHARM REV CODE 250: Performed by: NURSE ANESTHETIST, CERTIFIED REGISTERED

## 2024-08-07 PROCEDURE — 63600175 PHARM REV CODE 636 W HCPCS: Mod: JZ,JG | Performed by: STUDENT IN AN ORGANIZED HEALTH CARE EDUCATION/TRAINING PROGRAM

## 2024-08-07 PROCEDURE — 27201423 OPTIME MED/SURG SUP & DEVICES STERILE SUPPLY: Performed by: STUDENT IN AN ORGANIZED HEALTH CARE EDUCATION/TRAINING PROGRAM

## 2024-08-07 PROCEDURE — 85025 COMPLETE CBC W/AUTO DIFF WBC: CPT | Performed by: ANESTHESIOLOGY

## 2024-08-07 PROCEDURE — 37000009 HC ANESTHESIA EA ADD 15 MINS: Performed by: STUDENT IN AN ORGANIZED HEALTH CARE EDUCATION/TRAINING PROGRAM

## 2024-08-07 PROCEDURE — 81025 URINE PREGNANCY TEST: CPT | Performed by: ANESTHESIOLOGY

## 2024-08-07 PROCEDURE — 36000711: Performed by: STUDENT IN AN ORGANIZED HEALTH CARE EDUCATION/TRAINING PROGRAM

## 2024-08-07 PROCEDURE — 25000003 PHARM REV CODE 250: Performed by: STUDENT IN AN ORGANIZED HEALTH CARE EDUCATION/TRAINING PROGRAM

## 2024-08-07 PROCEDURE — 71000015 HC POSTOP RECOV 1ST HR: Performed by: STUDENT IN AN ORGANIZED HEALTH CARE EDUCATION/TRAINING PROGRAM

## 2024-08-07 PROCEDURE — 63600175 PHARM REV CODE 636 W HCPCS: Performed by: ANESTHESIOLOGY

## 2024-08-07 PROCEDURE — 36000710: Performed by: STUDENT IN AN ORGANIZED HEALTH CARE EDUCATION/TRAINING PROGRAM

## 2024-08-07 PROCEDURE — 80048 BASIC METABOLIC PNL TOTAL CA: CPT | Performed by: ANESTHESIOLOGY

## 2024-08-07 PROCEDURE — 71000033 HC RECOVERY, INTIAL HOUR: Performed by: STUDENT IN AN ORGANIZED HEALTH CARE EDUCATION/TRAINING PROGRAM

## 2024-08-07 RX ORDER — FENTANYL CITRATE 50 UG/ML
INJECTION, SOLUTION INTRAMUSCULAR; INTRAVENOUS
Status: DISCONTINUED | OUTPATIENT
Start: 2024-08-07 | End: 2024-08-07

## 2024-08-07 RX ORDER — FENTANYL CITRATE 50 UG/ML
25 INJECTION, SOLUTION INTRAMUSCULAR; INTRAVENOUS EVERY 5 MIN PRN
Status: COMPLETED | OUTPATIENT
Start: 2024-08-07 | End: 2024-08-07

## 2024-08-07 RX ORDER — LIDOCAINE HYDROCHLORIDE 20 MG/ML
INJECTION INTRAVENOUS
Status: DISCONTINUED | OUTPATIENT
Start: 2024-08-07 | End: 2024-08-07

## 2024-08-07 RX ORDER — ONDANSETRON HYDROCHLORIDE 2 MG/ML
INJECTION, SOLUTION INTRAVENOUS
Status: DISCONTINUED | OUTPATIENT
Start: 2024-08-07 | End: 2024-08-07

## 2024-08-07 RX ORDER — OXYCODONE AND ACETAMINOPHEN 5; 325 MG/1; MG/1
1 TABLET ORAL EVERY 4 HOURS PRN
Qty: 21 TABLET | Refills: 0 | Status: SHIPPED | OUTPATIENT
Start: 2024-08-07 | End: 2024-08-09 | Stop reason: SDUPTHER

## 2024-08-07 RX ORDER — PROPOFOL 10 MG/ML
VIAL (ML) INTRAVENOUS
Status: DISCONTINUED | OUTPATIENT
Start: 2024-08-07 | End: 2024-08-07

## 2024-08-07 RX ORDER — HYDROMORPHONE HYDROCHLORIDE 2 MG/ML
0.2 INJECTION, SOLUTION INTRAMUSCULAR; INTRAVENOUS; SUBCUTANEOUS EVERY 5 MIN PRN
Status: DISCONTINUED | OUTPATIENT
Start: 2024-08-07 | End: 2024-08-07 | Stop reason: HOSPADM

## 2024-08-07 RX ORDER — PHENYLEPHRINE HYDROCHLORIDE 10 MG/ML
INJECTION INTRAVENOUS
Status: DISCONTINUED | OUTPATIENT
Start: 2024-08-07 | End: 2024-08-07

## 2024-08-07 RX ORDER — SODIUM CHLORIDE 9 MG/ML
INJECTION, SOLUTION INTRAVENOUS CONTINUOUS
Status: DISCONTINUED | OUTPATIENT
Start: 2024-08-07 | End: 2024-08-07 | Stop reason: HOSPADM

## 2024-08-07 RX ORDER — MIDAZOLAM HYDROCHLORIDE 1 MG/ML
INJECTION INTRAMUSCULAR; INTRAVENOUS
Status: DISCONTINUED | OUTPATIENT
Start: 2024-08-07 | End: 2024-08-07

## 2024-08-07 RX ORDER — OXYCODONE HYDROCHLORIDE 5 MG/1
5 TABLET ORAL
Status: DISCONTINUED | OUTPATIENT
Start: 2024-08-07 | End: 2024-08-07 | Stop reason: HOSPADM

## 2024-08-07 RX ORDER — KETOROLAC TROMETHAMINE 30 MG/ML
INJECTION, SOLUTION INTRAMUSCULAR; INTRAVENOUS
Status: DISCONTINUED | OUTPATIENT
Start: 2024-08-07 | End: 2024-08-07

## 2024-08-07 RX ORDER — SUCCINYLCHOLINE CHLORIDE 20 MG/ML
INJECTION INTRAMUSCULAR; INTRAVENOUS
Status: DISCONTINUED | OUTPATIENT
Start: 2024-08-07 | End: 2024-08-07

## 2024-08-07 RX ORDER — LIDOCAINE HYDROCHLORIDE 10 MG/ML
1 INJECTION, SOLUTION EPIDURAL; INFILTRATION; INTRACAUDAL; PERINEURAL ONCE
Status: DISCONTINUED | OUTPATIENT
Start: 2024-08-07 | End: 2024-08-07 | Stop reason: HOSPADM

## 2024-08-07 RX ORDER — DEXAMETHASONE SODIUM PHOSPHATE 4 MG/ML
INJECTION, SOLUTION INTRA-ARTICULAR; INTRALESIONAL; INTRAMUSCULAR; INTRAVENOUS; SOFT TISSUE
Status: DISCONTINUED | OUTPATIENT
Start: 2024-08-07 | End: 2024-08-07

## 2024-08-07 RX ORDER — OXYCODONE HYDROCHLORIDE 5 MG/1
5 TABLET ORAL ONCE
Status: COMPLETED | OUTPATIENT
Start: 2024-08-07 | End: 2024-08-07

## 2024-08-07 RX ORDER — BUPIVACAINE HYDROCHLORIDE 2.5 MG/ML
INJECTION, SOLUTION EPIDURAL; INFILTRATION; INTRACAUDAL
Status: DISCONTINUED | OUTPATIENT
Start: 2024-08-07 | End: 2024-08-07 | Stop reason: HOSPADM

## 2024-08-07 RX ORDER — GLUCAGON 1 MG
1 KIT INJECTION
Status: DISCONTINUED | OUTPATIENT
Start: 2024-08-07 | End: 2024-08-07 | Stop reason: HOSPADM

## 2024-08-07 RX ORDER — ACETAMINOPHEN 10 MG/ML
INJECTION, SOLUTION INTRAVENOUS
Status: DISCONTINUED | OUTPATIENT
Start: 2024-08-07 | End: 2024-08-07

## 2024-08-07 RX ORDER — ROCURONIUM BROMIDE 10 MG/ML
INJECTION, SOLUTION INTRAVENOUS
Status: DISCONTINUED | OUTPATIENT
Start: 2024-08-07 | End: 2024-08-07

## 2024-08-07 RX ADMIN — SODIUM CHLORIDE, SODIUM GLUCONATE, SODIUM ACETATE, POTASSIUM CHLORIDE AND MAGNESIUM CHLORIDE: 526; 502; 368; 37; 30 INJECTION, SOLUTION INTRAVENOUS at 10:08

## 2024-08-07 RX ADMIN — PHENYLEPHRINE HYDROCHLORIDE 100 MCG: 10 INJECTION INTRAVENOUS at 09:08

## 2024-08-07 RX ADMIN — DEXAMETHASONE SODIUM PHOSPHATE 8 MG: 4 INJECTION, SOLUTION INTRA-ARTICULAR; INTRALESIONAL; INTRAMUSCULAR; INTRAVENOUS; SOFT TISSUE at 08:08

## 2024-08-07 RX ADMIN — ROCURONIUM BROMIDE 5 MG: 10 INJECTION, SOLUTION INTRAVENOUS at 08:08

## 2024-08-07 RX ADMIN — KETOROLAC TROMETHAMINE 30 MG: 30 INJECTION, SOLUTION INTRAMUSCULAR; INTRAVENOUS at 09:08

## 2024-08-07 RX ADMIN — FENTANYL CITRATE 25 MCG: 50 INJECTION, SOLUTION INTRAMUSCULAR; INTRAVENOUS at 10:08

## 2024-08-07 RX ADMIN — SODIUM CHLORIDE, SODIUM GLUCONATE, SODIUM ACETATE, POTASSIUM CHLORIDE AND MAGNESIUM CHLORIDE: 526; 502; 368; 37; 30 INJECTION, SOLUTION INTRAVENOUS at 08:08

## 2024-08-07 RX ADMIN — SUCCINYLCHOLINE CHLORIDE 140 MG: 20 INJECTION, SOLUTION INTRAMUSCULAR; INTRAVENOUS at 08:08

## 2024-08-07 RX ADMIN — SUGAMMADEX 200 MG: 100 INJECTION, SOLUTION INTRAVENOUS at 09:08

## 2024-08-07 RX ADMIN — OXYCODONE 5 MG: 5 TABLET ORAL at 12:08

## 2024-08-07 RX ADMIN — LIDOCAINE HYDROCHLORIDE 100 MG: 20 INJECTION, SOLUTION INTRAVENOUS at 08:08

## 2024-08-07 RX ADMIN — PROPOFOL 150 MG: 10 INJECTION, EMULSION INTRAVENOUS at 08:08

## 2024-08-07 RX ADMIN — ACETAMINOPHEN 1000 MG: 10 INJECTION, SOLUTION INTRAVENOUS at 08:08

## 2024-08-07 RX ADMIN — FENTANYL CITRATE 50 MCG: 50 INJECTION, SOLUTION INTRAMUSCULAR; INTRAVENOUS at 09:08

## 2024-08-07 RX ADMIN — SODIUM CHLORIDE, SODIUM GLUCONATE, SODIUM ACETATE, POTASSIUM CHLORIDE AND MAGNESIUM CHLORIDE: 526; 502; 368; 37; 30 INJECTION, SOLUTION INTRAVENOUS at 09:08

## 2024-08-07 RX ADMIN — ONDANSETRON 4 MG: 2 INJECTION INTRAMUSCULAR; INTRAVENOUS at 08:08

## 2024-08-07 RX ADMIN — ROCURONIUM BROMIDE 25 MG: 10 INJECTION, SOLUTION INTRAVENOUS at 08:08

## 2024-08-07 RX ADMIN — PHENYLEPHRINE HYDROCHLORIDE 200 MCG: 10 INJECTION INTRAVENOUS at 09:08

## 2024-08-07 RX ADMIN — FENTANYL CITRATE 25 MCG: 50 INJECTION, SOLUTION INTRAMUSCULAR; INTRAVENOUS at 09:08

## 2024-08-07 RX ADMIN — OXYCODONE 5 MG: 5 TABLET ORAL at 10:08

## 2024-08-07 RX ADMIN — MIDAZOLAM HYDROCHLORIDE 2 MG: 1 INJECTION, SOLUTION INTRAMUSCULAR; INTRAVENOUS at 08:08

## 2024-08-07 RX ADMIN — CEFAZOLIN 2 G: 2 INJECTION, POWDER, FOR SOLUTION INTRAMUSCULAR; INTRAVENOUS at 08:08

## 2024-08-07 RX ADMIN — FENTANYL CITRATE 50 MCG: 50 INJECTION, SOLUTION INTRAMUSCULAR; INTRAVENOUS at 08:08

## 2024-08-09 ENCOUNTER — PATIENT MESSAGE (OUTPATIENT)
Dept: SURGERY | Facility: CLINIC | Age: 34
End: 2024-08-09
Payer: COMMERCIAL

## 2024-08-09 RX ORDER — OXYCODONE AND ACETAMINOPHEN 5; 325 MG/1; MG/1
1 TABLET ORAL EVERY 4 HOURS PRN
Qty: 21 TABLET | Refills: 0 | Status: SHIPPED | OUTPATIENT
Start: 2024-08-09

## 2024-08-12 ENCOUNTER — E-VISIT (OUTPATIENT)
Dept: FAMILY MEDICINE | Facility: CLINIC | Age: 34
End: 2024-08-12
Payer: COMMERCIAL

## 2024-08-12 ENCOUNTER — OFFICE VISIT (OUTPATIENT)
Dept: OTOLARYNGOLOGY | Facility: CLINIC | Age: 34
End: 2024-08-12
Payer: COMMERCIAL

## 2024-08-12 VITALS — BODY MASS INDEX: 55.78 KG/M2 | WEIGHT: 276.69 LBS | HEIGHT: 59 IN

## 2024-08-12 DIAGNOSIS — R22.1 LUMP IN THROAT: Primary | ICD-10-CM

## 2024-08-12 DIAGNOSIS — E88.819 INSULIN RESISTANCE: ICD-10-CM

## 2024-08-12 DIAGNOSIS — E66.01 CLASS 3 SEVERE OBESITY DUE TO EXCESS CALORIES WITH SERIOUS COMORBIDITY AND BODY MASS INDEX (BMI) OF 50.0 TO 59.9 IN ADULT: Primary | ICD-10-CM

## 2024-08-12 DIAGNOSIS — R73.03 PREDIABETES: ICD-10-CM

## 2024-08-12 PROCEDURE — 99999 PR PBB SHADOW E&M-EST. PATIENT-LVL III: CPT | Mod: PBBFAC,,, | Performed by: NURSE PRACTITIONER

## 2024-08-12 PROCEDURE — 31575 DIAGNOSTIC LARYNGOSCOPY: CPT | Mod: S$GLB,,, | Performed by: NURSE PRACTITIONER

## 2024-08-12 PROCEDURE — 3044F HG A1C LEVEL LT 7.0%: CPT | Mod: CPTII,S$GLB,, | Performed by: NURSE PRACTITIONER

## 2024-08-12 PROCEDURE — 99203 OFFICE O/P NEW LOW 30 MIN: CPT | Mod: 25,S$GLB,, | Performed by: NURSE PRACTITIONER

## 2024-08-12 PROCEDURE — 3008F BODY MASS INDEX DOCD: CPT | Mod: CPTII,S$GLB,, | Performed by: NURSE PRACTITIONER

## 2024-08-12 PROCEDURE — 1159F MED LIST DOCD IN RCRD: CPT | Mod: CPTII,S$GLB,, | Performed by: NURSE PRACTITIONER

## 2024-08-12 RX ORDER — TIRZEPATIDE 2.5 MG/.5ML
2.5 INJECTION, SOLUTION SUBCUTANEOUS
Qty: 4 PEN | Refills: 5 | Status: SHIPPED | OUTPATIENT
Start: 2024-08-12 | End: 2025-01-27

## 2024-08-12 RX ORDER — SEMAGLUTIDE 0.68 MG/ML
0.25 INJECTION, SOLUTION SUBCUTANEOUS
Qty: 3 ML | Refills: 5 | Status: SHIPPED | OUTPATIENT
Start: 2024-08-12 | End: 2024-08-12 | Stop reason: ALTCHOICE

## 2024-08-12 NOTE — PROGRESS NOTES
Subjective     Patient ID: Rebecca Blankenship is a 33 y.o. female.    Chief Complaint: Consult (Bump in throat)    HPI  Patient is new to ENT, self-referred for throat concern. Patient had cholecystectomy 5 days ago by Dr. Harper for acute calculous cholecystitis with purulent hydrops. Prior to that, patient had EGD on 07/05/2024 by Dr. Montalvo for dyspepsia and upper abdominal pain. Patulous LE sphincter with normal oropharynx and normal cricopharyngeus was noted. Patient denies throat symptoms: no dysphagia, no dysphonia, no throat discomfort, no swelling. Patient was reportedly told that she has a bump on her right arytenoid that needed to be checked out by ENT to ensure no concern.     Review of Systems   Constitutional: Negative.    HENT: Negative.  Negative for sore throat, trouble swallowing and voice change.    Eyes: Negative.    Respiratory: Negative.  Negative for shortness of breath, wheezing and stridor.    Cardiovascular: Negative.    Gastrointestinal: Negative.    Musculoskeletal: Negative.    Integumentary:  Negative.   Neurological: Negative.    Hematological: Negative.    Psychiatric/Behavioral: Negative.            Objective     Physical Exam  Vitals and nursing note reviewed.   Constitutional:       General: She is not in acute distress.     Appearance: She is well-developed. She is not ill-appearing or diaphoretic.      Comments: BMI 55.88   HENT:      Head: Normocephalic and atraumatic.      Right Ear: Hearing, tympanic membrane, ear canal and external ear normal. No middle ear effusion. Tympanic membrane is not erythematous.      Left Ear: Hearing, tympanic membrane, ear canal and external ear normal.  No middle ear effusion. Tympanic membrane is not erythematous.      Nose: Nose normal. No mucosal edema, congestion or rhinorrhea.      Mouth/Throat:      Mouth: Mucous membranes are not pale, not dry and not cyanotic. No oral lesions.      Tongue: No lesions.      Palate: No lesions.       Pharynx: Uvula midline. No oropharyngeal exudate or posterior oropharyngeal erythema.   Eyes:      General: Lids are normal. No scleral icterus.        Right eye: No discharge.         Left eye: No discharge.   Neck:      Thyroid: No thyroid mass or thyromegaly.      Trachea: Trachea normal. No tracheal deviation.   Cardiovascular:      Rate and Rhythm: Normal rate.   Pulmonary:      Effort: Pulmonary effort is normal. No respiratory distress.      Breath sounds: Normal air entry.   Musculoskeletal:         General: Normal range of motion.      Cervical back: Normal range of motion and neck supple.   Lymphadenopathy:      Head:      Right side of head: No submental, submandibular, tonsillar, preauricular or posterior auricular adenopathy.      Left side of head: No submental, submandibular, tonsillar, preauricular or posterior auricular adenopathy.      Cervical: No cervical adenopathy.      Right cervical: No superficial or posterior cervical adenopathy.     Left cervical: No superficial or posterior cervical adenopathy.   Skin:     General: Skin is warm and dry.      Coloration: Skin is not pale.      Findings: No lesion or rash.   Neurological:      Mental Status: She is alert and oriented to person, place, and time.      Motor: Motor function is intact.      Coordination: Coordination is intact.      Gait: Gait normal.   Psychiatric:         Attention and Perception: Attention normal.         Mood and Affect: Mood normal.         Speech: Speech normal.         Behavior: Behavior normal. Behavior is cooperative.     Procedure: Flexible laryngoscopy    In order to fully examine the upper aerodigestive tract, including the larynx, in a patient with a hyperactive gag reflex, and suboptimal visualization with indirect mirror exam,  flexible endoscopy is required.   After explaining the procedure and obtaining verbal consent, a timeout was performed with the patient's participation according to the universal protocol.  Both nasal cavities were anesthetized with 4% Xylocaine spray mixed with Lefty-Synephrine. The flexible laryngoscope  was inserted into the nasal cavity and advanced to visualize the nasal cavity, nasopharynx, the posterior oropharynx, hypopharynx, and the endolarynx with the  findings noted. The scope was removed and the procedure terminated. The patient tolerated this procedure well without apparent complication.     OVERALL FINDINGS  Nasopharynx - the torus is clear. There are no lesions of the posterior wall.   Oropharynx - no lesions of the tongue base. There is no obvious fullness or asymmetry.  Hypopharynx - there are no lesions of the pyriform sinuses or postcricoid region   Larynx - there are no lesions of the supraglottic or glottic larynx.  Vocal fold mobility is normal.     SPECIFIC FINDINGS  Adenoid tissue - moderately hypertrophic   Nasopharynx & eustachian tube orifices - normal   Posterior pharyngeal wall - normal, somewhat crowded by large tonsils and redundant cervical adipose tissue  Base of tongue - normal   Epiglottis - normal   Valleculae - normal   Pyriform sinuses - normal   False vocal cords - normal   True vocal cords - normal  Arytenoids - normal   Interarytenoid space - normal  Adenoids    Tonsils & Oropharynx    Larynx    Larynx       Assessment and Plan     1. Lump in throat    2. BMI 50.0-59.9, adult      Reassured normal laryngeal exam, symmetric appearance and mobility during abduction and adduction.   Patient encouraged to return to clinic if symptoms worsen/persist and as needed for further ENT symptoms or concerns.          No follow-ups on file.

## 2024-08-12 NOTE — PROGRESS NOTES
Patient ID: Rebecca Blankenship is a 33 y.o. female.    Chief Complaint: General Illness (Entered automatically based on patient selection in [employer].)    The patient initiated a request through [employer] on 8/12/2024 for evaluation and management with a chief complaint of General Illness (Entered automatically based on patient selection in [employer].)     I evaluated the questionnaire submission on 8/12/24.    [facility] Peq Evisit Supergroup-Medication    8/12/2024  3:21 AM CDT - Filed by Patient   What do you need help with? Medication Request   Do you agree to participate in an E-Visit? Yes   If you have any of the following symptoms, please present to your local emergency room or call 911:  I acknowledge   Medication requests for narcotics will not be addressed via an E-Visit.  Please schedule an appointment. I acknowledge   Are you pregnant, could you be pregnant, or are you breast feeding? None of the above   Do you want to address a new or existing medication? I would like to start a new medication that I do not already take   What is the main issue you would like addressed today? Ozempic   What is the name of the medication that you would like to start? Ozempic, i stoppef because i haf gallbladder removal   Have you taken a similar medication in the past? Yes   What was the name of the similar medication? Ozempic   Why are you no longer on that medication? Side effects   List the side effects you had with the medication: Gas, constipation   Have you stopped taking the medicine? Yes   Are you still having side effects? No    What medical condition is the  medication intended to treat? Pre diabetes, weight loss, i stopped because i had gall bladder removal and haf some side effects   Are you able to take your vital signs? No          Active Problem List with Overview Notes    Diagnosis Date Noted    Calculus of gallbladder without cholecystitis without obstruction 08/07/2024    Upper abdominal pain 07/05/2024     Chronic bilateral low back pain without sciatica 12/22/2023     -chronic, stable  -remains on PRN NSAIDs      Anxiety 03/29/2023     -chronic, worsening over the past week  -has previously tried Zoloft and Vistaril which were ineffective  -trial of Buspar  -will consider SSRI/SNRI if symptoms persist  -discussed anxiety condition course  -discussed SSRI/SNRI as first-line treatment for this condition  -discussed risk of discontinuing this medication without tapering  -patient was educated, advised of side effects, and all questions were answered. Patient voiced understanding  -patient will follow up routinely and notify us if having any side effects or worsening or persistent symptoms. ER precautions were given.      Primary insomnia 03/29/2023     -chronic, likely due to anxiety  -start Trazodone 50 mg qhs  -has tried multiple OTC medication with minimal benefit  -discussed importance of good sleep hygiene practices        Mild major depression, single episode 12/18/2020     -in remission  -no longer on medications      Generalized anxiety disorder with panic attacks 12/18/2020    Osteoarthritis of both hips 09/17/2019    Prediabetes 12/16/2015     Lab Results   Component Value Date    HGBA1C 5.3 02/03/2023   -currently on GLP1a  -due for repeat A1C        Androgen-dependent hirsutism 12/16/2015    Gastroesophageal reflux disease 11/20/2015     -symptoms controlled with daily PPI  -denies alarm symptoms, such as dysphagia, weight loss or N/V  -continue lifestyle modification with avoidance of acidic foods, caffeine, late night eating      Morbid obesity with BMI of 50.0-59.9, adult      General weight loss/lifestyle modification strategies discussed (elicit support from others; identify saboteurs; non-food rewards, etc).  Informal exercise measures discussed, e.g. taking stairs instead of elevator.  Regular aerobic exercise program discussed.  Doing well with GLP1a          Recent Labs Obtained:  Admission on  08/07/2024, Discharged on 08/07/2024   Component Date Value Ref Range Status    POC Preg Test, Ur 08/07/2024 Negative  Negative Final     Acceptable 08/07/2024 Yes   Final    WBC 08/07/2024 7.02  3.90 - 12.70 K/uL Final    RBC 08/07/2024 4.42  4.00 - 5.40 M/uL Final    Hemoglobin 08/07/2024 12.9  12.0 - 16.0 g/dL Final    Hematocrit 08/07/2024 38.6  37.0 - 48.5 % Final    MCV 08/07/2024 87  82 - 98 fL Final    MCH 08/07/2024 29.2  27.0 - 31.0 pg Final    MCHC 08/07/2024 33.4  32.0 - 36.0 g/dL Final    RDW 08/07/2024 13.2  11.5 - 14.5 % Final    Platelets 08/07/2024 309  150 - 450 K/uL Final    MPV 08/07/2024 9.2  9.2 - 12.9 fL Final    Immature Granulocytes 08/07/2024 0.3  0.0 - 0.5 % Final    Gran # (ANC) 08/07/2024 3.8  1.8 - 7.7 K/uL Final    Immature Grans (Abs) 08/07/2024 0.02  0.00 - 0.04 K/uL Final    Comment: Mild elevation in immature granulocytes is non specific and   can be seen in a variety of conditions including stress response,   acute inflammation, trauma and pregnancy. Correlation with other   laboratory and clinical findings is essential.      Lymph # 08/07/2024 2.7  1.0 - 4.8 K/uL Final    Mono # 08/07/2024 0.4  0.3 - 1.0 K/uL Final    Eos # 08/07/2024 0.1  0.0 - 0.5 K/uL Final    Baso # 08/07/2024 0.03  0.00 - 0.20 K/uL Final    nRBC 08/07/2024 0  0 /100 WBC Final    Gran % 08/07/2024 53.9  38.0 - 73.0 % Final    Lymph % 08/07/2024 38.5  18.0 - 48.0 % Final    Mono % 08/07/2024 6.0  4.0 - 15.0 % Final    Eosinophil % 08/07/2024 0.9  0.0 - 8.0 % Final    Basophil % 08/07/2024 0.4  0.0 - 1.9 % Final    Differential Method 08/07/2024 Automated   Final    Sodium 08/07/2024 136  136 - 145 mmol/L Final    Potassium 08/07/2024 3.9  3.5 - 5.1 mmol/L Final    Chloride 08/07/2024 106  95 - 110 mmol/L Final    CO2 08/07/2024 19 (L)  23 - 29 mmol/L Final    Glucose 08/07/2024 104  70 - 110 mg/dL Final    BUN 08/07/2024 10  6 - 20 mg/dL Final    Creatinine 08/07/2024 0.7  0.5 - 1.4 mg/dL Final     Calcium 08/07/2024 9.1  8.7 - 10.5 mg/dL Final    Anion Gap 08/07/2024 11  8 - 16 mmol/L Final    eGFR 08/07/2024 >60  >60 mL/min/1.73 m^2 Final       Encounter Diagnoses   Name Primary?    Class 3 severe obesity due to excess calories with serious comorbidity and body mass index (BMI) of 50.0 to 59.9 in adult Yes    Prediabetes     Insulin resistance         No orders of the defined types were placed in this encounter.     Medications Ordered This Encounter   Medications    semaglutide (OZEMPIC) 0.25 mg or 0.5 mg (2 mg/3 mL) pen injector     Sig: Inject 0.25 mg into the skin every 7 days.     Dispense:  3 mL     Refill:  5        E-Visit Time Tracking:

## 2024-08-14 ENCOUNTER — PATIENT MESSAGE (OUTPATIENT)
Dept: SURGERY | Facility: CLINIC | Age: 34
End: 2024-08-14
Payer: COMMERCIAL

## 2024-08-14 NOTE — LETTER
August 14, 2024    Rebecca Blankenship  [address]  [facility] [address]         [facility]  [address]  [facility] [place] [facility] [address]  Phone: [phone] August 14, 2024     Patient: Rebecca Blankenship   YOB: 1990   Date of Visit: 8/07/24       To Whom It May Concern:    It is my medical opinion that Rebecca Blankenship  No lifting, pushing or pulling greater than 10 pounds .  Expected return to full duty will be 8/27/24 if patients condition allows.  She is scheduled for a follow up appointment on 8/26/24.    If you have any questions or concerns, please don't hesitate to call.    Sincerely,        Luis Antonio Harper MD

## 2024-08-14 NOTE — LETTER
August 14, 2024    Rebecca Blankenship  [address]  [place] [address]         [facility]  [address] [address]  Phone: [phone] August 14, 2024     Patient: Rebecca Blankenship   YOB: 1990   Date of Visit: 8/14/2024       To Whom It May Concern:    It is my medical opinion that Rebecca Blankenship may return to light duty immediately with the following restrictions: No lifting, pushing or pulling greater than 20 pounds .  Expected return to full duty will be 8/27/24 if patients condition allows.  She is scheduled for a follow up appointment on 8/26/24.  If you have any questions or concerns, please don't hesitate to call.    Sincerely,        Luis Antonio Harper MD

## 2024-08-21 ENCOUNTER — E-VISIT (OUTPATIENT)
Dept: FAMILY MEDICINE | Facility: CLINIC | Age: 34
End: 2024-08-21
Payer: COMMERCIAL

## 2024-08-21 DIAGNOSIS — B37.31 VULVOVAGINAL CANDIDIASIS: Primary | ICD-10-CM

## 2024-08-22 ENCOUNTER — TELEPHONE (OUTPATIENT)
Dept: GASTROENTEROLOGY | Facility: CLINIC | Age: 34
End: 2024-08-22
Payer: COMMERCIAL

## 2024-08-22 DIAGNOSIS — R10.10 PAIN OF UPPER ABDOMEN: ICD-10-CM

## 2024-08-22 DIAGNOSIS — R11.0 NAUSEA: ICD-10-CM

## 2024-08-22 RX ORDER — PANTOPRAZOLE SODIUM 40 MG/1
40 TABLET, DELAYED RELEASE ORAL
Qty: 30 TABLET | Refills: 1 | Status: SHIPPED | OUTPATIENT
Start: 2024-08-22

## 2024-08-22 RX ORDER — FLUCONAZOLE 150 MG/1
150 TABLET ORAL
Qty: 2 TABLET | Refills: 0 | Status: SHIPPED | OUTPATIENT
Start: 2024-08-22 | End: 2024-08-26

## 2024-08-22 NOTE — PROGRESS NOTES
Patient ID: Rebecca Blankenship is a 34 y.o. female.    Chief Complaint: General Illness (Entered automatically based on patient selection in [employer].)    The patient initiated a request through [employer] on 8/21/2024 for evaluation and management with a chief complaint of General Illness (Entered automatically based on patient selection in [employer].)     I evaluated the questionnaire submission on 8/22/24.    [facility] Peq Evisit Supergroup-Common Problems    8/21/2024 10:00 PM CDT - Filed by Patient   What do you need help with? Urinary Symptoms   Do you agree to participate in an E-Visit? Yes   If you have any of the following symptoms, please present to your local emergency room or call 911:  I acknowledge   Are you pregnant, could you be pregnant, or are you breast feeding? None of the above   What is the main issue you would like addressed today? Vaginal itching and discharge   What symptoms do you currently have? Pain while passing urine   When did your symptoms first appear? 8/19/2024   List what you have done or taken to help your symptoms. Tioconazole   Please indicate whether you have had the following symptoms during the past 24 hours     Urgent urination (a sudden and uncontrollable urge to urinate) None   Frequent urination of small amounts of urine (going to the toilet very often) None   Burning pain when urinating None   Incomplete bladder emptying (still feel like you need to urinate again after urination) None   Pain not associated with urination in the lower abdomen below the belly button) None   What does your urine look like? Clear   Blood seen in the urine None   Flank pain (pain in one or both sides of the lower back) Mild   Abnormal Vaginal Discharge (abnormal amount, color and/or odor) Moderate   Discharge from the urethra (urinary opening) without urination Moderate   High body temperature/fever? None-<99.5   Please rate how much discomfort you have experience because of the symptoms in the  past 24 hours: Moderate   Please indicate how the symptoms have interfered with your every day activities/work in the past 24 hours: Mild   Please indicate how these symptoms have interfered with your social activities (visiting people, meeting with friends, etc.) in the past 24 hours? None   Are you a diabetic? No   Please indicate whether you have the following at the time of completion of this questionnaire: None of the above   Provide any additional information you feel is important. Cottage like discharge, itching, burning   Please attach any relevant images or files (if you have performed a home test for UTI, please submit a photo of results)    Are you able to take your vital signs? No          Active Problem List with Overview Notes    Diagnosis Date Noted    Calculus of gallbladder without cholecystitis without obstruction 08/07/2024    Upper abdominal pain 07/05/2024    Chronic bilateral low back pain without sciatica 12/22/2023     -chronic, stable  -remains on PRN NSAIDs      Anxiety 03/29/2023     -chronic, worsening over the past week  -has previously tried Zoloft and Vistaril which were ineffective  -trial of Buspar  -will consider SSRI/SNRI if symptoms persist  -discussed anxiety condition course  -discussed SSRI/SNRI as first-line treatment for this condition  -discussed risk of discontinuing this medication without tapering  -patient was educated, advised of side effects, and all questions were answered. Patient voiced understanding  -patient will follow up routinely and notify us if having any side effects or worsening or persistent symptoms. ER precautions were given.      Primary insomnia 03/29/2023     -chronic, likely due to anxiety  -start Trazodone 50 mg qhs  -has tried multiple OTC medication with minimal benefit  -discussed importance of good sleep hygiene practices        Mild major depression, single episode 12/18/2020     -in remission  -no longer on medications      Generalized anxiety  disorder with panic attacks 12/18/2020    Osteoarthritis of both hips 09/17/2019    Prediabetes 12/16/2015     Lab Results   Component Value Date    HGBA1C 5.3 02/03/2023   -currently on GLP1a  -due for repeat A1C        Androgen-dependent hirsutism 12/16/2015    Gastroesophageal reflux disease 11/20/2015     -symptoms controlled with daily PPI  -denies alarm symptoms, such as dysphagia, weight loss or N/V  -continue lifestyle modification with avoidance of acidic foods, caffeine, late night eating      Morbid obesity with BMI of 50.0-59.9, adult      General weight loss/lifestyle modification strategies discussed (elicit support from others; identify saboteurs; non-food rewards, etc).  Informal exercise measures discussed, e.g. taking stairs instead of elevator.  Regular aerobic exercise program discussed.  Doing well with GLP1a          Recent Labs Obtained:  No visits with results within 7 Day(s) from this visit.   Latest known visit with results is:   Admission on 08/07/2024, Discharged on 08/07/2024   Component Date Value Ref Range Status    POC Preg Test, Ur 08/07/2024 Negative  Negative Final     Acceptable 08/07/2024 Yes   Final    WBC 08/07/2024 7.02  3.90 - 12.70 K/uL Final    RBC 08/07/2024 4.42  4.00 - 5.40 M/uL Final    Hemoglobin 08/07/2024 12.9  12.0 - 16.0 g/dL Final    Hematocrit 08/07/2024 38.6  37.0 - 48.5 % Final    MCV 08/07/2024 87  82 - 98 fL Final    MCH 08/07/2024 29.2  27.0 - 31.0 pg Final    MCHC 08/07/2024 33.4  32.0 - 36.0 g/dL Final    RDW 08/07/2024 13.2  11.5 - 14.5 % Final    Platelets 08/07/2024 309  150 - 450 K/uL Final    MPV 08/07/2024 9.2  9.2 - 12.9 fL Final    Immature Granulocytes 08/07/2024 0.3  0.0 - 0.5 % Final    Gran # (ANC) 08/07/2024 3.8  1.8 - 7.7 K/uL Final    Immature Grans (Abs) 08/07/2024 0.02  0.00 - 0.04 K/uL Final    Comment: Mild elevation in immature granulocytes is non specific and   can be seen in a variety of conditions including stress  response,   acute inflammation, trauma and pregnancy. Correlation with other   laboratory and clinical findings is essential.      Lymph # 08/07/2024 2.7  1.0 - 4.8 K/uL Final    Mono # 08/07/2024 0.4  0.3 - 1.0 K/uL Final    Eos # 08/07/2024 0.1  0.0 - 0.5 K/uL Final    Baso # 08/07/2024 0.03  0.00 - 0.20 K/uL Final    nRBC 08/07/2024 0  0 /100 WBC Final    Gran % 08/07/2024 53.9  38.0 - 73.0 % Final    Lymph % 08/07/2024 38.5  18.0 - 48.0 % Final    Mono % 08/07/2024 6.0  4.0 - 15.0 % Final    Eosinophil % 08/07/2024 0.9  0.0 - 8.0 % Final    Basophil % 08/07/2024 0.4  0.0 - 1.9 % Final    Differential Method 08/07/2024 Automated   Final    Sodium 08/07/2024 136  136 - 145 mmol/L Final    Potassium 08/07/2024 3.9  3.5 - 5.1 mmol/L Final    Chloride 08/07/2024 106  95 - 110 mmol/L Final    CO2 08/07/2024 19 (L)  23 - 29 mmol/L Final    Glucose 08/07/2024 104  70 - 110 mg/dL Final    BUN 08/07/2024 10  6 - 20 mg/dL Final    Creatinine 08/07/2024 0.7  0.5 - 1.4 mg/dL Final    Calcium 08/07/2024 9.1  8.7 - 10.5 mg/dL Final    Anion Gap 08/07/2024 11  8 - 16 mmol/L Final    eGFR 08/07/2024 >60  >60 mL/min/1.73 m^2 Final       Encounter Diagnosis   Name Primary?    Vulvovaginal candidiasis Yes        No orders of the defined types were placed in this encounter.     Medications Ordered This Encounter   Medications    fluconazole (DIFLUCAN) 150 MG Tab     Sig: Take 1 tablet (150 mg total) by mouth every 72 hours. for 2 doses     Dispense:  2 tablet     Refill:  0        E-Visit Time Tracking:

## 2024-08-22 NOTE — TELEPHONE ENCOUNTER
Please inform the patient that I refilled the prescription for protonix and patient is due for a follow-up visit for continued evaluation and management.  Thanks  CAMILA

## 2024-08-23 ENCOUNTER — PATIENT MESSAGE (OUTPATIENT)
Dept: FAMILY MEDICINE | Facility: CLINIC | Age: 34
End: 2024-08-23
Payer: COMMERCIAL

## 2024-08-23 DIAGNOSIS — E66.01 CLASS 3 SEVERE OBESITY DUE TO EXCESS CALORIES WITH SERIOUS COMORBIDITY AND BODY MASS INDEX (BMI) OF 50.0 TO 59.9 IN ADULT: Primary | ICD-10-CM

## 2024-08-23 NOTE — TELEPHONE ENCOUNTER
I have signed for the following orders AND/OR meds.  Please call the patient and ask the patient to schedule the testing AND/OR inform about any medications that were sent. Medications have been sent to pharmacy listed below      No orders of the defined types were placed in this encounter.      Medications Ordered This Encounter   Medications    semaglutide, weight loss, 0.25 mg/0.5 mL PnIj     Sig: Inject 0.25 mg into the skin every 7 days.     Dispense:  2 mL     Refill:  5         [facility] [place] [facility] [place] [address] [address]  Phone: [phone] Fax: [phone]    [facility] [address] [place] [address]  Phone: [phone] Fax: [phone]    [facility] - [place] [address]  Phone: [phone] Fax: [phone]

## 2024-08-26 ENCOUNTER — OFFICE VISIT (OUTPATIENT)
Dept: SURGERY | Facility: CLINIC | Age: 34
End: 2024-08-26
Payer: COMMERCIAL

## 2024-08-26 VITALS
TEMPERATURE: 97 F | DIASTOLIC BLOOD PRESSURE: 84 MMHG | SYSTOLIC BLOOD PRESSURE: 135 MMHG | HEIGHT: 59 IN | BODY MASS INDEX: 55.38 KG/M2 | HEART RATE: 79 BPM | WEIGHT: 274.69 LBS

## 2024-08-26 DIAGNOSIS — Z98.890 POST-OPERATIVE STATE: Primary | ICD-10-CM

## 2024-08-26 PROCEDURE — 99024 POSTOP FOLLOW-UP VISIT: CPT | Mod: S$GLB,,, | Performed by: STUDENT IN AN ORGANIZED HEALTH CARE EDUCATION/TRAINING PROGRAM

## 2024-08-26 PROCEDURE — 1159F MED LIST DOCD IN RCRD: CPT | Mod: CPTII,S$GLB,, | Performed by: STUDENT IN AN ORGANIZED HEALTH CARE EDUCATION/TRAINING PROGRAM

## 2024-08-26 PROCEDURE — 99999 PR PBB SHADOW E&M-EST. PATIENT-LVL III: CPT | Mod: PBBFAC,,, | Performed by: STUDENT IN AN ORGANIZED HEALTH CARE EDUCATION/TRAINING PROGRAM

## 2024-08-26 PROCEDURE — 3075F SYST BP GE 130 - 139MM HG: CPT | Mod: CPTII,S$GLB,, | Performed by: STUDENT IN AN ORGANIZED HEALTH CARE EDUCATION/TRAINING PROGRAM

## 2024-08-26 PROCEDURE — 3044F HG A1C LEVEL LT 7.0%: CPT | Mod: CPTII,S$GLB,, | Performed by: STUDENT IN AN ORGANIZED HEALTH CARE EDUCATION/TRAINING PROGRAM

## 2024-08-26 PROCEDURE — 3079F DIAST BP 80-89 MM HG: CPT | Mod: CPTII,S$GLB,, | Performed by: STUDENT IN AN ORGANIZED HEALTH CARE EDUCATION/TRAINING PROGRAM

## 2024-08-26 RX ORDER — PHENTERMINE HYDROCHLORIDE 37.5 MG/1
37.5 TABLET ORAL
COMMUNITY

## 2024-08-26 NOTE — PROGRESS NOTES
Postop note     Patient underwent cholecystectomy for cholecystitis.      Doing well    Incisions are healing well     Pathology: Benign gallbladder    Overall doing well.  Follow up as needed.

## 2024-08-29 ENCOUNTER — TELEPHONE (OUTPATIENT)
Dept: FAMILY MEDICINE | Facility: CLINIC | Age: 34
End: 2024-08-29

## 2024-08-29 ENCOUNTER — E-VISIT (OUTPATIENT)
Dept: FAMILY MEDICINE | Facility: CLINIC | Age: 34
End: 2024-08-29
Payer: COMMERCIAL

## 2024-08-29 DIAGNOSIS — M54.9 DORSALGIA, UNSPECIFIED: Primary | ICD-10-CM

## 2024-08-29 NOTE — TELEPHONE ENCOUNTER
I have signed for the following orders AND/OR meds.  Please call the patient and ask the patient to schedule the testing AND/OR inform about any medications that were sent. Medications have been sent to pharmacy listed below      Orders Placed This Encounter   Procedures    MRI Lumbar Spine Without Contrast     Standing Status:   Future     Standing Expiration Date:   8/29/2025     Order Specific Question:   Does the patient have or ever had a pacemaker or a defibrillator (Note: Some facilities may not be able to schedule an MRI for patients with pacemakers and defibrillators. You should contact your local radiology dept to determine if this is the case.)?     Answer:   No     Order Specific Question:   Does the patient have an aneurysm or surgical clip, pump, nerve/brain stimulator, middle/inner ear prosthesis, or other metal implant or foreign object (bullet, shrapnel)? If they have a card related to their implant, ask them to bring it. Issues related to the implant may cause the MRI to be delayed.     Answer:   No     Order Specific Question:   Will the patient require po anxiolysis or sedation?     Answer:   No     Order Specific Question:   May the Radiologist modify the order per protocol to meet the clinical needs of the patient?     Answer:   Yes     Order Specific Question:   Recist criteria?     Answer:   Yes     Order Specific Question:   Does the patient have on a skin patch for medication with aluminized backing?     Answer:   No              [facility] [address]  [address] [address]  Phone: [phone] Fax: [phone]    [facility] [address]  [address] [facility] [address]  Phone: [phone] Fax: [phone]    [facility] [address] [address]  Phone: [phone] Fax: [phone]

## 2024-08-29 NOTE — Clinical Note
MRI of lumbar spine ordered but her insurance may require her to be seen in clinic. We will have to see if they approve it

## 2024-08-29 NOTE — TELEPHONE ENCOUNTER
----- Message from Dionne Cole MD sent at 8/29/2024 12:37 PM CDT -----  MRI of lumbar spine ordered but her insurance may require her to be seen in clinic. We will have to see if they approve it

## 2024-08-29 NOTE — PROGRESS NOTES
Patient ID: Rebecca Blankenship is a 34 y.o. female.    Chief Complaint: General Illness (Entered automatically based on patient selection in [facility].)    The patient initiated a request through [facility] on 8/29/2024 for evaluation and management with a chief complaint of General Illness (Entered automatically based on patient selection in [facility].)     I evaluated the questionnaire submission on 08/29/2024 .    [facility] Peq Evisit [facility]group-Muscle,Back,Joint    8/29/2024  6:05 AM CDT - Filed by Patient   What do you need help with? Back Problem   Do you agree to participate in an E-Visit? Yes   If you have any of the following symptoms, please present to your local emergency room or call 911: I acknowledge   Are you pregnant, could you be pregnant, or are you breast feeding? None of the above   What is the main issue you would like addressed today? Low back pain right side and numbness   Where are you having pain? Lower Back   Does the pain extend into your legs? No   How bad is the pain? The pain is as bad as I have ever had   Did you have an injury that caused the pain? No, I cannot remember an injury   How long has the pain been present? More than 4 weeks   Have you had back pain in the past? Yes, I have infrequently had pain similar to this before   Please list any medications or treatments you have used for back pain and indicate if it was effective or not. Ibuprofen   Do you have a fever? No, I do not have a fever   Do you have any of the following? Areas that are numb or have a strange sensation   What makes the pain worse? Sitting down;  Strenuous activity   What makes the pain better? Nothing makes it better   Have you ever been diagnosed with cancer? No   Have you ever been diagnosed with degenerative disc disease (arthritis of the spine)? No   Have you ever been diagnosed with osteoporosis or any other bone weakness? No   Have you ever had surgery on your back or spine? No   What is your usual health  status? My activity is physically restricted   Provide any additional information you feel is important. When i stand or walk for longer than 10 mins cause extreme pain. I am requiring an MRI OR CT. I have a pending consult for laproscopy with my OBGYN for Thursday to rule out endometriosis but would like MRI before procedure to see if i have nerve problems   Please attach any relevant images or files    Are you able to take your vital signs? No          Active Problem List with Overview Notes    Diagnosis Date Noted    Calculus of gallbladder without cholecystitis without obstruction 08/07/2024    Upper abdominal pain 07/05/2024    Chronic bilateral low back pain without sciatica 12/22/2023     -chronic, stable  -remains on PRN NSAIDs      Anxiety 03/29/2023     -chronic, worsening over the past week  -has previously tried Zoloft and Vistaril which were ineffective  -trial of Buspar  -will consider SSRI/SNRI if symptoms persist  -discussed anxiety condition course  -discussed SSRI/SNRI as first-line treatment for this condition  -discussed risk of discontinuing this medication without tapering  -patient was educated, advised of side effects, and all questions were answered. Patient voiced understanding  -patient will follow up routinely and notify us if having any side effects or worsening or persistent symptoms. ER precautions were given.      Primary insomnia 03/29/2023     -chronic, likely due to anxiety  -start Trazodone 50 mg qhs  -has tried multiple OTC medication with minimal benefit  -discussed importance of good sleep hygiene practices        Mild major depression, single episode 12/18/2020     -in remission  -no longer on medications      Generalized anxiety disorder with panic attacks 12/18/2020    Osteoarthritis of both hips 09/17/2019    Prediabetes 12/16/2015     Lab Results   Component Value Date    HGBA1C 5.3 02/03/2023   -currently on GLP1a  -due for repeat A1C        Androgen-dependent hirsutism  12/16/2015    Gastroesophageal reflux disease 11/20/2015     -symptoms controlled with daily PPI  -denies alarm symptoms, such as dysphagia, weight loss or N/V  -continue lifestyle modification with avoidance of acidic foods, caffeine, late night eating      Morbid obesity with BMI of 50.0-59.9, adult      General weight loss/lifestyle modification strategies discussed (elicit support from others; identify saboteurs; non-food rewards, etc).  Informal exercise measures discussed, e.g. taking stairs instead of elevator.  Regular aerobic exercise program discussed.  Doing well with GLP1a          Recent Labs Obtained:  No visits with results within 7 Day(s) from this visit.   Latest known visit with results is:   Admission on 08/07/2024, Discharged on 08/07/2024   Component Date Value Ref Range Status    POC Preg Test, Ur 08/07/2024 Negative  Negative Final     Acceptable 08/07/2024 Yes   Final    WBC 08/07/2024 7.02  3.90 - 12.70 K/uL Final    RBC 08/07/2024 4.42  4.00 - 5.40 M/uL Final    Hemoglobin 08/07/2024 12.9  12.0 - 16.0 g/dL Final    Hematocrit 08/07/2024 38.6  37.0 - 48.5 % Final    MCV 08/07/2024 87  82 - 98 fL Final    MCH 08/07/2024 29.2  27.0 - 31.0 pg Final    MCHC 08/07/2024 33.4  32.0 - 36.0 g/dL Final    RDW 08/07/2024 13.2  11.5 - 14.5 % Final    Platelets 08/07/2024 309  150 - 450 K/uL Final    MPV 08/07/2024 9.2  9.2 - 12.9 fL Final    Immature Granulocytes 08/07/2024 0.3  0.0 - 0.5 % Final    Gran # (ANC) 08/07/2024 3.8  1.8 - 7.7 K/uL Final    Immature Grans (Abs) 08/07/2024 0.02  0.00 - 0.04 K/uL Final    Comment: Mild elevation in immature granulocytes is non specific and   can be seen in a variety of conditions including stress response,   acute inflammation, trauma and pregnancy. Correlation with other   laboratory and clinical findings is essential.      Lymph # 08/07/2024 2.7  1.0 - 4.8 K/uL Final    Mono # 08/07/2024 0.4  0.3 - 1.0 K/uL Final    Eos # 08/07/2024 0.1  0.0 -  0.5 K/uL Final    Baso # 08/07/2024 0.03  0.00 - 0.20 K/uL Final    nRBC 08/07/2024 0  0 /100 WBC Final    Gran % 08/07/2024 53.9  38.0 - 73.0 % Final    Lymph % 08/07/2024 38.5  18.0 - 48.0 % Final    Mono % 08/07/2024 6.0  4.0 - 15.0 % Final    Eosinophil % 08/07/2024 0.9  0.0 - 8.0 % Final    Basophil % 08/07/2024 0.4  0.0 - 1.9 % Final    Differential Method 08/07/2024 Automated   Final    Sodium 08/07/2024 136  136 - 145 mmol/L Final    Potassium 08/07/2024 3.9  3.5 - 5.1 mmol/L Final    Chloride 08/07/2024 106  95 - 110 mmol/L Final    CO2 08/07/2024 19 (L)  23 - 29 mmol/L Final    Glucose 08/07/2024 104  70 - 110 mg/dL Final    BUN 08/07/2024 10  6 - 20 mg/dL Final    Creatinine 08/07/2024 0.7  0.5 - 1.4 mg/dL Final    Calcium 08/07/2024 9.1  8.7 - 10.5 mg/dL Final    Anion Gap 08/07/2024 11  8 - 16 mmol/L Final    eGFR 08/07/2024 >60  >60 mL/min/1.73 m^2 Final       Encounter Diagnosis   Name Primary?    Dorsalgia, unspecified Yes        Orders Placed This Encounter   Procedures    MRI Lumbar Spine Without Contrast     Standing Status:   Future     Standing Expiration Date:   8/29/2025     Order Specific Question:   Does the patient have or ever had a pacemaker or a defibrillator (Note: Some facilities may not be able to schedule an MRI for patients with pacemakers and defibrillators. You should contact your local radiology dept to determine if this is the case.)?     Answer:   No     Order Specific Question:   Does the patient have an aneurysm or surgical clip, pump, nerve/brain stimulator, middle/inner ear prosthesis, or other metal implant or foreign object (bullet, shrapnel)? If they have a card related to their implant, ask them to bring it. Issues related to the implant may cause the MRI to be delayed.     Answer:   No     Order Specific Question:   Will the patient require po anxiolysis or sedation?     Answer:   No     Order Specific Question:   May the Radiologist modify the order per protocol to  meet the clinical needs of the patient?     Answer:   Yes     Order Specific Question:   Recist criteria?     Answer:   Yes     Order Specific Question:   Does the patient have on a skin patch for medication with aluminized backing?     Answer:   No            E-Visit Time Tracking:

## 2024-08-30 ENCOUNTER — TELEPHONE (OUTPATIENT)
Dept: FAMILY MEDICINE | Facility: CLINIC | Age: 34
End: 2024-08-30
Payer: COMMERCIAL

## 2024-08-30 NOTE — TELEPHONE ENCOUNTER
I spoke with the patient about this. MRI Lumbar Spine Without Contrast scheduled. Pt aware of appointment date and time. Pt advised to contact the office if she needs to cancel or reschedule.

## 2024-08-30 NOTE — TELEPHONE ENCOUNTER
----- Message from Hue Mattson sent at 8/30/2024  2:17 PM CDT -----  Contact: Rebecca [phone]  Would like to receive medical advice.    Would they like a call back or a response via [facility]:  call back    Additional information:  Rebecca is calling rto schedule the MRI/Cat Scan. Pt states [facility] never called her to schedule anything and the pt would like to know if it is possible to schedule the MRI/Cat Scan within [facility]. Please call Rebecca back for advice

## 2024-09-04 ENCOUNTER — PATIENT MESSAGE (OUTPATIENT)
Dept: FAMILY MEDICINE | Facility: CLINIC | Age: 34
End: 2024-09-04
Payer: COMMERCIAL

## 2024-09-09 ENCOUNTER — OFFICE VISIT (OUTPATIENT)
Dept: FAMILY MEDICINE | Facility: CLINIC | Age: 34
End: 2024-09-09
Payer: COMMERCIAL

## 2024-09-09 DIAGNOSIS — M54.50 CHRONIC MIDLINE LOW BACK PAIN WITHOUT SCIATICA: Primary | ICD-10-CM

## 2024-09-09 DIAGNOSIS — M25.551 RIGHT HIP PAIN: ICD-10-CM

## 2024-09-09 DIAGNOSIS — E66.01 MORBID OBESITY WITH BMI OF 50.0-59.9, ADULT: ICD-10-CM

## 2024-09-09 DIAGNOSIS — G89.29 CHRONIC MIDLINE LOW BACK PAIN WITHOUT SCIATICA: Primary | ICD-10-CM

## 2024-09-09 PROCEDURE — 99214 OFFICE O/P EST MOD 30 MIN: CPT | Mod: 95,,, | Performed by: PHYSICIAN ASSISTANT

## 2024-09-09 PROCEDURE — 1160F RVW MEDS BY RX/DR IN RCRD: CPT | Mod: CPTII,95,, | Performed by: PHYSICIAN ASSISTANT

## 2024-09-09 PROCEDURE — 1159F MED LIST DOCD IN RCRD: CPT | Mod: CPTII,95,, | Performed by: PHYSICIAN ASSISTANT

## 2024-09-09 PROCEDURE — 3044F HG A1C LEVEL LT 7.0%: CPT | Mod: CPTII,95,, | Performed by: PHYSICIAN ASSISTANT

## 2024-09-09 RX ORDER — GABAPENTIN 100 MG/1
100 CAPSULE ORAL 3 TIMES DAILY
Qty: 90 CAPSULE | Refills: 2 | Status: SHIPPED | OUTPATIENT
Start: 2024-09-09 | End: 2024-12-08

## 2024-09-09 NOTE — PROGRESS NOTES
Primary Care Telemedicine Note  The patient location is: Patient Home   The chief complaint leading to consultation is: Low back pain  Total time spent with patient: 20 minutes    Visit type: Virtual visit with synchronous audio only and video  Each patient to whom he or she provides medical services by telemedicine is: (1) informed of the relationship between the physician and patient and the respective role of any other health care provider with respect to management of the patient; and (2) notified that he or she may decline to receive medical services by telemedicine and may withdraw from such care at any time.      Assessment/Plan:    Problem List Items Addressed This Visit          Endocrine    Morbid obesity with BMI of 50.0-59.9, adult    Overview     General weight loss/lifestyle modification strategies discussed (elicit support from others; identify saboteurs; non-food rewards, etc).  Informal exercise measures discussed, e.g. taking stairs instead of elevator.  Regular aerobic exercise program discussed.   Medication: Adipex  Unable to get GLP1 due to insurance issues            Orthopedic    Chronic midline low back pain without sciatica - Primary    Overview     -chronic, worsening  -no alarm symptoms or signs present  -recent MRI lumbar spine 9/2024 unremarkable  -will obtain R hip XR per patient request  -trial of gabapentin  -continue conservative treatment, rest, ice/heat applications, PRN anti-inflammatory medication  -plan to start PT  -also referring to pain management for further evaluation and treatment  -ER precautions for severe or worsening of symptoms          Relevant Medications    gabapentin (NEURONTIN) 100 MG capsule    Other Relevant Orders    Ambulatory referral/consult to Physical/Occupational Therapy    Ambulatory referral/consult to Pain Clinic     Other Visit Diagnoses       Right hip pain        Relevant Medications    gabapentin (NEURONTIN) 100 MG capsule    Other Relevant  Orders    Ambulatory referral/consult to Physical/Occupational Therapy    X-Ray Hip 2 or 3 views Right with Pelvis when performed          Follow up if symptoms worsen or fail to improve.    Judy Navarro PA-C  _____________________________________________________________________________________________________________________________________________________    CC: low back pain    HPI: Patient is an established patient who presents today via virtual visit for low back pain. Patient reports worsening of low back pain over the past couple of years. Pain is mainly located along the midline of her low back and radiates to her right hip and pelvis. She also experiences numbness in her back. The pain has significantly worsened over the past three months. She denies any recent trauma or injuries. Ibuprofen was previously ineffective for pain control. An MRI of the lumbar spine on September 4th was reported as normal, with no stenosis or significant degenerative changes noted. She also reports ongoing GYN issues in which she was recently diagnosed with adenomyosis and is being tested for endometriosis. A laparoscopic procedure is scheduled for the September 19th to see if this is contributing to her current symptoms.     WEIGHT MANAGEMENT:  She previously considered compounded medication for weight management but did not pursue it due to concerns about company reviews and potential cost. She is now considering restarting Adipex. She expresses frustration with insurance not covering medications like Wegovy or similar options, making it difficult to get these medications approved.    No other complaints today.    Past Medical History:   Diagnosis Date    Fatty liver     Gastroesophageal reflux disease 11/20/2015    Hepatomegaly     Insulin resistance     Iron deficiency anemia     Morbid obesity with BMI of 50.0-59.9, adult     Oligomenorrhea      Past Surgical History:   Procedure Laterality Date     ESOPHAGOGASTRODUODENOSCOPY N/A 7/5/2024    Procedure: EGD (ESOPHAGOGASTRODUODENOSCOPY);  Surgeon: Ellis Montalvo Jr., MD;  Location: [facility];  Service: Endoscopy;  Laterality: N/A;    ROBOT-ASSISTED CHOLECYSTECTOMY N/A 8/7/2024    Procedure: ROBOTIC CHOLECYSTECTOMY;  Surgeon: Luis Antonio Harper MD;  [facility] OR;  Service: General;  Laterality: N/A;     Review of patient's allergies indicates:  No Known Allergies  Social History     Tobacco Use    Smoking status: Never    Smokeless tobacco: Never   Substance Use Topics    Alcohol use: Yes     Alcohol/week: 0.0 - 1.0 standard drinks of alcohol     Comment: occ     Drug use: Not Currently     Family History   Problem Relation Name Age of Onset    Fibroids Mother      Breast cancer Maternal Aunt Tip     Cancer Maternal Aunt Tip         Breast    Aortic aneurysm Maternal Aunt Several     Heart disease Maternal Aunt Several     Coronary artery disease Maternal Aunt          S/P stent    Hypertension Maternal Aunt Carmen     Stomach cancer Maternal Uncle      Stomach cancer Maternal Uncle Several     Drug abuse Maternal Uncle Several         Mostly all druga    Heart attack Maternal Uncle      Coronary artery disease Maternal Uncle          S/P stent    Stroke Maternal Uncle Pj     Hypertension Maternal Uncle Hardik     Heart disease Maternal Uncle Many     Heart failure Maternal Grandmother Glen     Stroke Maternal Grandmother Glen     Hypertension Maternal Grandmother Glen     Kidney disease Maternal Grandmother Glen     Heart disease Maternal Grandmother Glen     Colon cancer Paternal Grandfather Hammad     Esophageal cancer Neg Hx       Current Outpatient Medications on File Prior to Visit   Medication Sig Dispense Refill    EScitalopram oxalate (LEXAPRO) 10 MG tablet Take 1 tablet (10 mg total) by mouth once daily. 90 tablet 3    ibuprofen (ADVIL,MOTRIN) 800 MG tablet Take 1 tablet (800 mg total) by mouth 3 (three) times daily as needed for Pain  (with food). (Patient not taking: Reported on 8/12/2024) 90 tablet 0    MARY ELLEN FE 1/20, 28, 1 mg-20 mcg (21)/75 mg (7) per tablet Take 1 tablet by mouth Daily.      pantoprazole (PROTONIX) 40 MG tablet TAKE 1 TABLET BY MOUTH DAILY BEFORE BREAKFAST 30 tablet 1    phentermine (ADIPEX-P) 37.5 mg tablet Take 37.5 mg by mouth before breakfast.      semaglutide, weight loss, 0.25 mg/0.5 mL PnIj Inject 0.25 mg into the skin every 7 days. (Patient not taking: Reported on 8/26/2024) 2 mL 5     No current facility-administered medications on file prior to visit.       Review of Systems   Constitutional:  Negative for chills, fever, malaise/fatigue and weight loss.   Respiratory:  Negative for cough and shortness of breath.    Cardiovascular:  Negative for chest pain, palpitations and leg swelling.   Gastrointestinal:  Negative for abdominal pain, constipation and diarrhea.   Genitourinary:  Negative for dysuria, frequency and hematuria.   Musculoskeletal:  Positive for back pain. Negative for joint pain.   Neurological:  Positive for weakness. Negative for tingling and headaches.   Psychiatric/Behavioral:  Negative for depression. The patient is not nervous/anxious.          Physical Exam  Constitutional:       General: She is not in acute distress.     Appearance: She is well-developed.   HENT:      Head: Normocephalic and atraumatic.   Pulmonary:      Effort: Pulmonary effort is normal. No respiratory distress.   Abdominal:      General: There is no distension.   Musculoskeletal:         General: Normal range of motion.      Cervical back: Normal range of motion.   Neurological:      Mental Status: She is alert and oriented to person, place, and time.   Psychiatric:         Mood and Affect: Mood normal.         Behavior: Behavior normal.         The patient's Health Maintenance was reviewed and the following appears to be due at this time:  Health Maintenance Due   Topic Date Due    Influenza Vaccine (1) 09/01/2024     COVID-19 Vaccine (5 - 2023-24 season) 09/01/2024       DISCLAIMER: This note was compiled by using a speech recognition dictation system and therefore please be aware that typographical / speech recognition errors can and do occur.  Please contact me if you see any errors specifically.  Consent was obtained for [employer] recording system prior to the visit.

## 2024-09-09 NOTE — Clinical Note
Referral placed to pain management and PT at [facility]. R hip XR also ordered. Can you please assist with scheduling?

## 2024-09-10 ENCOUNTER — CLINICAL SUPPORT (OUTPATIENT)
Dept: REHABILITATION | Facility: HOSPITAL | Age: 34
End: 2024-09-10
Payer: COMMERCIAL

## 2024-09-10 ENCOUNTER — HOSPITAL ENCOUNTER (OUTPATIENT)
Dept: RADIOLOGY | Facility: HOSPITAL | Age: 34
Discharge: HOME | End: 2024-09-10
Attending: PHYSICIAN ASSISTANT
Payer: COMMERCIAL

## 2024-09-10 DIAGNOSIS — M25.551 RIGHT HIP PAIN: ICD-10-CM

## 2024-09-10 DIAGNOSIS — M54.50 CHRONIC MIDLINE LOW BACK PAIN WITHOUT SCIATICA: ICD-10-CM

## 2024-09-10 DIAGNOSIS — G89.29 CHRONIC MIDLINE LOW BACK PAIN WITHOUT SCIATICA: ICD-10-CM

## 2024-09-10 PROCEDURE — 73502 X-RAY EXAM HIP UNI 2-3 VIEWS: CPT | Mod: 26,RT,, | Performed by: RADIOLOGY

## 2024-09-10 PROCEDURE — 97110 THERAPEUTIC EXERCISES: CPT | Mod: PN

## 2024-09-10 PROCEDURE — 97161 PT EVAL LOW COMPLEX 20 MIN: CPT | Mod: PN

## 2024-09-10 PROCEDURE — 73502 X-RAY EXAM HIP UNI 2-3 VIEWS: CPT | Mod: TC,PO,RT

## 2024-09-12 NOTE — PROGRESS NOTES
Results have been released via [employer]. Please verify that these have been viewed by patient. If not, please call patient with results.     I have sent a message to them with the following interpretation (see below).    I have reviewed your recent R hip XR which showed mild degenerative changes otherwise no acute findings or significant abnormalities were seen.      Please do not hesitate to call or message with any additional questions or concerns.    Judy Navarro PA-C
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
## 2024-10-14 ENCOUNTER — LAB VISIT (OUTPATIENT)
Dept: LAB | Facility: HOSPITAL | Age: 34
End: 2024-10-14
Attending: INTERNAL MEDICINE
Payer: COMMERCIAL

## 2024-10-14 ENCOUNTER — OFFICE VISIT (OUTPATIENT)
Dept: FAMILY MEDICINE | Facility: CLINIC | Age: 34
End: 2024-10-14
Payer: COMMERCIAL

## 2024-10-14 VITALS
WEIGHT: 276 LBS | HEIGHT: 59 IN | TEMPERATURE: 98 F | SYSTOLIC BLOOD PRESSURE: 123 MMHG | BODY MASS INDEX: 55.64 KG/M2 | HEART RATE: 88 BPM | DIASTOLIC BLOOD PRESSURE: 80 MMHG

## 2024-10-14 DIAGNOSIS — F51.01 PRIMARY INSOMNIA: ICD-10-CM

## 2024-10-14 DIAGNOSIS — E28.2 PCOS (POLYCYSTIC OVARIAN SYNDROME): ICD-10-CM

## 2024-10-14 DIAGNOSIS — E66.01 MORBID OBESITY WITH BMI OF 50.0-59.9, ADULT: ICD-10-CM

## 2024-10-14 DIAGNOSIS — Z00.00 ENCOUNTER FOR ANNUAL HEALTH EXAMINATION: ICD-10-CM

## 2024-10-14 DIAGNOSIS — Z23 INFLUENZA VACCINE NEEDED: Primary | ICD-10-CM

## 2024-10-14 DIAGNOSIS — F41.9 ANXIETY: ICD-10-CM

## 2024-10-14 DIAGNOSIS — F32.0 MILD MAJOR DEPRESSION, SINGLE EPISODE: ICD-10-CM

## 2024-10-14 DIAGNOSIS — K21.9 GASTROESOPHAGEAL REFLUX DISEASE WITHOUT ESOPHAGITIS: ICD-10-CM

## 2024-10-14 LAB
CHOLEST SERPL-MCNC: 183 MG/DL (ref 120–199)
CHOLEST/HDLC SERPL: 3.3 {RATIO} (ref 2–5)
ESTIMATED AVG GLUCOSE: 114 MG/DL (ref 68–131)
HBA1C MFR BLD: 5.6 % (ref 4–5.6)
HDLC SERPL-MCNC: 56 MG/DL (ref 40–75)
HDLC SERPL: 30.6 % (ref 20–50)
LDLC SERPL CALC-MCNC: 107.8 MG/DL (ref 63–159)
NONHDLC SERPL-MCNC: 127 MG/DL
TRIGL SERPL-MCNC: 96 MG/DL (ref 30–150)
TSH SERPL DL<=0.005 MIU/L-ACNC: 3.35 UIU/ML (ref 0.4–4)

## 2024-10-14 PROCEDURE — 90656 IIV3 VACC NO PRSV 0.5 ML IM: CPT | Mod: S$GLB,,, | Performed by: INTERNAL MEDICINE

## 2024-10-14 PROCEDURE — 99999 PR PBB SHADOW E&M-EST. PATIENT-LVL IV: CPT | Mod: PBBFAC,,, | Performed by: INTERNAL MEDICINE

## 2024-10-14 PROCEDURE — 3074F SYST BP LT 130 MM HG: CPT | Mod: CPTII,S$GLB,, | Performed by: INTERNAL MEDICINE

## 2024-10-14 PROCEDURE — 1159F MED LIST DOCD IN RCRD: CPT | Mod: CPTII,S$GLB,, | Performed by: INTERNAL MEDICINE

## 2024-10-14 PROCEDURE — 80061 LIPID PANEL: CPT | Performed by: INTERNAL MEDICINE

## 2024-10-14 PROCEDURE — 90471 IMMUNIZATION ADMIN: CPT | Mod: S$GLB,,, | Performed by: INTERNAL MEDICINE

## 2024-10-14 PROCEDURE — 3008F BODY MASS INDEX DOCD: CPT | Mod: CPTII,S$GLB,, | Performed by: INTERNAL MEDICINE

## 2024-10-14 PROCEDURE — 83036 HEMOGLOBIN GLYCOSYLATED A1C: CPT | Performed by: INTERNAL MEDICINE

## 2024-10-14 PROCEDURE — 99395 PREV VISIT EST AGE 18-39: CPT | Mod: 25,S$GLB,, | Performed by: INTERNAL MEDICINE

## 2024-10-14 PROCEDURE — 84443 ASSAY THYROID STIM HORMONE: CPT | Performed by: INTERNAL MEDICINE

## 2024-10-14 PROCEDURE — 3079F DIAST BP 80-89 MM HG: CPT | Mod: CPTII,S$GLB,, | Performed by: INTERNAL MEDICINE

## 2024-10-14 PROCEDURE — 3044F HG A1C LEVEL LT 7.0%: CPT | Mod: CPTII,S$GLB,, | Performed by: INTERNAL MEDICINE

## 2024-10-14 RX ORDER — HYDROXYZINE PAMOATE 25 MG/1
25 CAPSULE ORAL EVERY 4 HOURS PRN
COMMUNITY
Start: 2024-09-24

## 2024-10-14 RX ORDER — TRAZODONE HYDROCHLORIDE 50 MG/1
TABLET ORAL
Qty: 42 TABLET | Refills: 0 | Status: SHIPPED | OUTPATIENT
Start: 2024-10-14 | End: 2024-11-11

## 2024-10-14 RX ORDER — PANTOPRAZOLE SODIUM 40 MG/1
40 TABLET, DELAYED RELEASE ORAL DAILY
Qty: 90 TABLET | Refills: 3 | Status: SHIPPED | OUTPATIENT
Start: 2024-10-14

## 2024-10-14 RX ORDER — METFORMIN HYDROCHLORIDE 500 MG/1
500 TABLET, EXTENDED RELEASE ORAL
Qty: 90 TABLET | Refills: 3 | Status: SHIPPED | OUTPATIENT
Start: 2024-10-14 | End: 2025-10-14

## 2024-10-14 RX ORDER — ESCITALOPRAM OXALATE 20 MG/1
20 TABLET ORAL DAILY
Qty: 90 TABLET | Refills: 3 | Status: SHIPPED | OUTPATIENT
Start: 2024-10-14 | End: 2025-10-14

## 2024-10-21 NOTE — PROGRESS NOTES
Assessment/Plan:    Patient here for annual wellness exam. Health maintenance was reviewed and ordered.    Complete history and physical was completed today.  Complete and thorough medication reconciliation was performed.  Discussed risks and benefits of medications.  Advised patient on orders and health maintenance. Continue current medications listed on your summary sheet.    All questions were answered. Patient had no further concerns. Advised of diagnoses and plan, as listed below.    Problem List Items Addressed This Visit          Psychiatric    Mild major depression, single episode    Overview     -in remission  -currently on lexapro but more so for treatment of anxiety symptoms         Relevant Medications    EScitalopram oxalate (LEXAPRO) 20 MG tablet    Anxiety    Overview     -chronic, worsening   -has previously tried Zoloft and Vistaril which were ineffective  -remains on lexapro 10 mg QD with prn hydroxyzine  -plan to increase lexapro dose to 20 mg QD  -discussed risk of discontinuing this medication without tapering  -patient was educated, advised of side effects, and all questions were answered. Patient voiced understanding  -patient will follow up routinely and notify us if having any side effects or worsening or persistent symptoms. ER precautions were given.            Endocrine    Morbid obesity with BMI of 50.0-59.9, adult    Overview     General weight loss/lifestyle modification strategies discussed (elicit support from others; identify saboteurs; non-food rewards, etc).  Informal exercise measures discussed, e.g. taking stairs instead of elevator.  Regular aerobic exercise program discussed.   Medication: Adipex  Unable to get GLP1 due to insurance issues            GI    Gastroesophageal reflux disease    Overview     -symptoms controlled with daily PPI  -denies alarm symptoms, such as dysphagia, weight loss or N/V  -continue lifestyle modification with avoidance of acidic foods, caffeine,  late night eating         Relevant Medications    pantoprazole (PROTONIX) 40 MG tablet       Other    Primary insomnia    Overview     -chronic, likely due to anxiety  -restart Trazodone 50 mg qhs with instructions to increase to 100 mg if needed  -has tried multiple OTC medication with minimal benefit  -discussed importance of good sleep hygiene practices           Relevant Medications    traZODone (DESYREL) 50 MG tablet     Other Visit Diagnoses       Influenza vaccine needed    -  Primary    Relevant Medications    influenza (Flulaval, Fluzone, Fluarix) 45 mcg/0.5 mL IM vaccine (> or = 6 mo) 0.5 mL (Completed)    PCOS (polycystic ovarian syndrome)        Relevant Medications    metFORMIN (GLUCOPHAGE-XR) 500 MG ER 24hr tablet    Encounter for annual health examination                Follow up if symptoms worsen or fail to improve.    Dionne Cole MD       WELLNESS EXAM      Patient ID: Rebecca Blankenship is a 34 y.o. female.  has a past medical history of Fatty liver, Gastroesophageal reflux disease (11/20/2015), Hepatomegaly, Insulin resistance, Iron deficiency anemia, Morbid obesity with BMI of 50.0-59.9, adult, and Oligomenorrhea.     Chief Complaint:  Encounter for wellness exam    History of Present Illness  The patient is a 34-year-old female here for an annual exam.    She reports overall good health but expresses concern about her anxiety, which she feels is not adequately managed by her current medication, Lexapro 10 mg. She also mentions difficulty sleeping, even when taking hydroxyzine at night. She has tried melatonin, which she found helpful, but it took some time to take effect. She does not report any specific worries that keep her awake. She discontinued trazodone on 10/04/2023 due to lack of effectiveness.    She takes Adipex in the morning, which she last refilled in 08/2024. She had a two-week break from Adipex due to two procedures.    She recently started a new medication for  endometriosis and is considering resuming metformin, which she previously stopped due to starting Ozempic. She notes weight gain since discontinuing Ozempic.    She continues to take gabapentin for back pain.    No other new complaints today.  Remaining chronic conditions have been reviewed and remain stable. Further detail as stated above.      Health Maintenance reviewed - annual labs and flu shot today.    Health Maintenance Topics with due status: Not Due       Topic Last Completion Date    TETANUS VACCINE 02/01/2021    Cervical Cancer Screening 10/20/2023    Hemoglobin A1c (Prediabetes) 10/14/2024    RSV Vaccine (Age 60+ and Pregnant patients) Not Due        Past Medical History:  Past Medical History:   Diagnosis Date    Fatty liver     Gastroesophageal reflux disease 11/20/2015    Hepatomegaly     Insulin resistance     Iron deficiency anemia     Morbid obesity with BMI of 50.0-59.9, adult     Oligomenorrhea      Past Surgical History:   Procedure Laterality Date    ESOPHAGOGASTRODUODENOSCOPY N/A 7/5/2024    Procedure: EGD (ESOPHAGOGASTRODUODENOSCOPY);  Surgeon: Ellis Montalvo Jr., MD;  Location: [facility];  Service: Endoscopy;  Laterality: N/A;    ROBOT-ASSISTED CHOLECYSTECTOMY N/A 8/7/2024    Procedure: ROBOTIC CHOLECYSTECTOMY;  Surgeon: Luis Antonio Harper MD;  Location: [facility];  Service: General;  Laterality: N/A;     Review of patient's allergies indicates:  No Known Allergies  Current Outpatient Medications on File Prior to Visit   Medication Sig Dispense Refill    elagolix 150 mg Tab Take 150 mg by mouth once daily.      gabapentin (NEURONTIN) 100 MG capsule Take 1 capsule (100 mg total) by mouth 3 (three) times daily. 90 capsule 2    hydrOXYzine pamoate (VISTARIL) 25 MG Cap Take 25 mg by mouth every 4 (four) hours as needed.      ibuprofen (ADVIL,MOTRIN) 800 MG tablet Take 1 tablet (800 mg total) by mouth 3 (three) times daily as needed for Pain (with food). 90 tablet 0    MARY ELLEN FE 1/20, 28, 1 mg-20  mcg (21)/75 mg (7) per tablet Take 1 tablet by mouth Daily.      phentermine (ADIPEX-P) 37.5 mg tablet Take 37.5 mg by mouth before breakfast.       No current facility-administered medications on file prior to visit.     Social History     Socioeconomic History    Marital status:     Number of children: 0   Occupational History     Employer: [employer]   Tobacco Use    Smoking status: Never    Smokeless tobacco: Never   Substance and Sexual Activity    Alcohol use: Yes     Alcohol/week: 0.0 - 1.0 standard drinks of alcohol     Comment: occ     Drug use: Yes     Frequency: 1.0 times per week     Types: Marijuana    Sexual activity: Yes     Partners: Male     Birth control/protection: OCP   Social History Narrative    The patient is single and has no children.  She lives alone.  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     Social Drivers of Health     Financial Resource Strain: Low Risk  (11/28/2023)    Overall Financial Resource Strain (CARDIA)     Difficulty of Paying Living Expenses: Not hard at all   Food Insecurity: No Food Insecurity (9/12/2024)    Received from [facility]    Hunger Vital Sign     Worried About Running Out of Food in the Last Year: Never true     Ran Out of Food in the Last Year: Never true   Transportation Needs: No Transportation Needs (9/12/2024)    Received from [facility]    PRAPARE - Transportation     Lack of Transportation (Medical): No     Lack of Transportation (Non-Medical): No   Physical Activity: Insufficiently Active (11/28/2023)    Exercise Vital Sign     Days of Exercise per Week: 1 day     Minutes of Exercise per Session: 60 min   Stress: No Stress Concern Present (11/28/2023)    [employer] - Occupational Stress Questionnaire     Feeling of Stress : Only a little   Housing Stability: Low Risk  (11/28/2023)    Housing Stability Vital Sign     Unable to Pay for Housing in the Last  Year: No     Number of Places Lived in the Last Year: 1     Unstable Housing in the Last Year: No     Family History   Problem Relation Name Age of Onset    Fibroids Mother      Breast cancer Maternal Aunt Tip     Cancer Maternal Aunt Tip         Breast    Aortic aneurysm Maternal Aunt Several     Heart disease Maternal Aunt Several     Coronary artery disease Maternal Aunt          S/P stent    Hypertension Maternal Aunt Carmen     Stomach cancer Maternal Uncle      Stomach cancer Maternal Uncle Several     Drug abuse Maternal Uncle Several         Mostly all druga    Heart attack Maternal Uncle      Coronary artery disease Maternal Uncle          S/P stent    Stroke Maternal Uncle Pj     Hypertension Maternal Uncle Hardik     Heart disease Maternal Uncle Many     Heart failure Maternal Grandmother [place]     Stroke Maternal Grandmother [place]     Hypertension Maternal Grandmother [place]     Kidney disease Maternal Grandmother [place]     Heart disease Maternal Grandmother [place]     Colon cancer Paternal Grandfather Hammad     Cancer Paternal Grandfather Hammad         Colon    Esophageal cancer Neg Hx         Review of Systems   Constitutional:  Negative for chills, fever and malaise/fatigue.   HENT:  Negative for hearing loss.    Eyes:  Negative for discharge.   Respiratory:  Negative for cough, shortness of breath and wheezing.    Cardiovascular:  Negative for chest pain, palpitations and leg swelling.   Gastrointestinal:  Negative for abdominal pain, blood in stool, constipation, diarrhea and vomiting.   Genitourinary:  Negative for dysuria, frequency and hematuria.   Musculoskeletal:  Negative for back pain, joint pain and neck pain.   Neurological:  Negative for weakness and headaches.   Endo/Heme/Allergies:  Negative for polydipsia.   Psychiatric/Behavioral:  Negative for depression and suicidal ideas. The patient is nervous/anxious and has insomnia.            Objective:      Vitals:    10/14/24 0819   BP:  123/80   Pulse: 88   Temp: 97.9 °F (36.6 °C)     Body mass index is 55.75 kg/m².     Physical Exam  Constitutional:       General: She is not in acute distress.     Appearance: Normal appearance. She is well-developed. She is obese.   HENT:      Head: Normocephalic and atraumatic.   Eyes:      Conjunctiva/sclera: Conjunctivae normal.   Cardiovascular:      Rate and Rhythm: Normal rate and regular rhythm.      Pulses: Normal pulses.      Heart sounds: Normal heart sounds. No murmur heard.  Pulmonary:      Effort: Pulmonary effort is normal. No respiratory distress.      Breath sounds: Normal breath sounds.   Abdominal:      General: Bowel sounds are normal. There is no distension.      Palpations: Abdomen is soft.      Tenderness: There is no abdominal tenderness.   Musculoskeletal:         General: Normal range of motion.      Cervical back: Normal range of motion and neck supple.   Skin:     General: Skin is warm and dry.      Findings: No rash.   Neurological:      General: No focal deficit present.      Mental Status: She is alert and oriented to person, place, and time.   Psychiatric:         Mood and Affect: Mood normal.         Behavior: Behavior normal.         Thought Content: Thought content normal.         Judgment: Judgment normal.             All labs, imaging and procedures performed since last visit have been personally reviewed.    DISCLAIMER: This note was compiled by using a speech recognition dictation system and therefore please be aware that typographical / speech recognition errors can and do occur.  Please contact me if you see any errors specifically.  Consent was obtained for JANKI recording system prior to the visit.  Answers submitted by the patient for this visit:  Review of Systems Questionnaire (Submitted on 10/14/2024)  activity change: No  unexpected weight change: Yes  rhinorrhea: No  trouble swallowing: No  visual disturbance: No  chest tightness: No  polyuria: No  difficulty  urinating: No  menstrual problem: Yes  joint swelling: No  arthralgias: Yes  confusion: No  dysphoric mood: No     [place]

## 2024-12-12 ENCOUNTER — PATIENT MESSAGE (OUTPATIENT)
Dept: FAMILY MEDICINE | Facility: CLINIC | Age: 34
End: 2024-12-12

## 2024-12-12 ENCOUNTER — TELEPHONE (OUTPATIENT)
Dept: FAMILY MEDICINE | Facility: CLINIC | Age: 34
End: 2024-12-12

## 2024-12-12 DIAGNOSIS — E66.01 MORBID OBESITY WITH BMI OF 50.0-59.9, ADULT: Primary | ICD-10-CM

## 2024-12-12 NOTE — TELEPHONE ENCOUNTER
----- Message from Nelly sent at 12/12/2024  2:21 PM CST -----  Patient no longer need this appointment  [phone]

## 2024-12-13 ENCOUNTER — OFFICE VISIT (OUTPATIENT)
Dept: PAIN MEDICINE | Facility: CLINIC | Age: 34
End: 2024-12-13
Payer: COMMERCIAL

## 2024-12-13 VITALS
WEIGHT: 205.69 LBS | HEIGHT: 59 IN | HEART RATE: 81 BPM | RESPIRATION RATE: 17 BRPM | BODY MASS INDEX: 41.47 KG/M2 | DIASTOLIC BLOOD PRESSURE: 83 MMHG | SYSTOLIC BLOOD PRESSURE: 121 MMHG

## 2024-12-13 DIAGNOSIS — E66.01 MORBID OBESITY WITH BMI OF 40.0-44.9, ADULT: ICD-10-CM

## 2024-12-13 DIAGNOSIS — M79.18 CHRONIC MYOFASCIAL PAIN: Primary | ICD-10-CM

## 2024-12-13 DIAGNOSIS — G89.29 CHRONIC MYOFASCIAL PAIN: Primary | ICD-10-CM

## 2024-12-13 PROCEDURE — 99999 PR PBB SHADOW E&M-EST. PATIENT-LVL IV: CPT | Mod: PBBFAC,,, | Performed by: PHYSICAL MEDICINE & REHABILITATION

## 2024-12-13 RX ORDER — PHENTERMINE HYDROCHLORIDE 37.5 MG/1
37.5 TABLET ORAL
Qty: 30 TABLET | Refills: 3 | Status: SHIPPED | OUTPATIENT
Start: 2024-12-13

## 2024-12-13 RX ORDER — CYCLOBENZAPRINE HCL 10 MG
TABLET ORAL
Qty: 60 TABLET | Refills: 0 | Status: SHIPPED | OUTPATIENT
Start: 2024-12-13

## 2024-12-13 NOTE — TELEPHONE ENCOUNTER
I have signed for the following orders AND/OR meds.  Please call the patient and ask the patient to schedule the testing AND/OR inform about any medications that were sent. Medications have been sent to pharmacy listed below        The patient was checked in the [facility] Board of Pharmacy's  Prescription Monitoring Program. There appears to be no incongruities with the patient's verbalized history.      No orders of the defined types were placed in this encounter.      Medications Ordered This Encounter   Medications    phentermine (ADIPEX-P) 37.5 mg tablet     Sig: Take 1 tablet (37.5 mg total) by mouth before breakfast.     Dispense:  30 tablet     Refill:  3         [facility] [address]  [address] [address]  Phone: [phone] Fax: [phone]    [facility] [address] [place] [id number]  Phone: [phone] Fax: [phone]    [facility] [place] [address]  Phone: [phone] Fax: [phone]

## 2024-12-13 NOTE — PROGRESS NOTES
New Patient Chronic Pain Note (Initial Visit)    Referring Physician: No ref. provider found    PCP: Dionne Cole MD    Chief Complaint:   Chief Complaint   Patient presents with    Back Pain        SUBJECTIVE:    Rebecca Blankenship is a 34 y.o. female who presents to the clinic for the evaluation of  back pain. She was referred by her primary care team.   She has past medical history of depression, anxiety, morbid obesity, prediabetes, GERD, multiple other medical comorbidities as listed in her chart.The pain started about 2 years ago and symptoms have been worsening.The pain is located in the lumbosacral area and radiates to the bilateral hip and buttock area.  The pain is described as  sharp, stabbing, aching  and is rated as 7/10. The pain is rated with a score of  3/10 on the BEST day and a score of 9/10 on the WORST day.  Symptoms interfere with daily activity. The pain is exacerbated by prolonged standing.  The pain is mitigated by sitting. Employment status:  Desk work    Patient denies night fever/night sweats, urinary incontinence, bowel incontinence, significant weight loss, significant motor weakness, and loss of sensations.    Pain Disability Index Review:         12/13/2024    11:03 AM   Last 3 PDI Scores   Pain Disability Index (PDI) 38       Non-Pharmacologic Treatments:  Physical Therapy/Home Exercise: yes, only 2-3 sessions  Ice/Heat:yes  TENS: no  Acupuncture: no  Massage: yes  Chiropractic: yes    Other: no      Pain Medications:  - Opioids: Percocet, tramadol  - Adjuvant Medications: gabapentin, Lexapro, NSAIDs, trazodone  - Anti-Coagulants:  none     report:  Reviewed and consistent with medication use as prescribed.    Pain Procedures:   Denies      Imaging:    X-ray right hip 09/10/2024:   A frontal view of the pelvis including both hips and additional frontal and frog leg views of the right hip were submitted. No fracture, dislocation or focal bone lesions are identified. Mild  degenerative spurring is visible at the lateral margin of the superior acetabulum both hips.     MRI lumbar spine 09/04/2024:  NOMENCLATURE: Five lumbar type vertebral bodies.     CORD/CAUDA EQUINA: Conus has normal size and signal and ends at a normal level of L2.     ALIGNMENT: Normal.     BONES: Vertebral body heights are maintained.  No aggressive bone marrow signal.     PARASPINAL AREA: Incidental benign-appearing 1.2 x 2.1 x 3.1 cm cystic structure in the left dorsal paraspinal fat superficial to the dorsal paraspinal musculature.     LUMBAR DISC LEVELS:     T12-L1: No disc herniation or significant posterior osteophytic ridging.  No significant spinal canal or foraminal stenosis.     L1-L2: No disc herniation or significant posterior osteophytic ridging.  No significant spinal canal or foraminal stenosis.     L2-L3: No disc herniation or significant posterior osteophytic ridging.  No significant spinal canal or foraminal stenosis.     L3-L4: No disc herniation or significant posterior osteophytic ridging.  Minimal bilateral facet hypertrophy.  No significant spinal canal or foraminal stenosis.     L4-L5: No disc herniation or significant posterior osteophytic ridging.  Mild bilateral facet hypertrophy with trace right facet effusion.  No significant spinal canal or foraminal stenosis.     L5-S1: No disc herniation or significant posterior osteophytic ridging.  Mild bilateral facet hypertrophy.  No significant spinal canal or foraminal stenosis.       Past Medical History:   Diagnosis Date    Fatty liver     Gastroesophageal reflux disease 11/20/2015    Hepatomegaly     Insulin resistance     Iron deficiency anemia     Morbid obesity with BMI of 50.0-59.9, adult     Oligomenorrhea      Past Surgical History:   Procedure Laterality Date    ESOPHAGOGASTRODUODENOSCOPY N/A 7/5/2024    Procedure: EGD (ESOPHAGOGASTRODUODENOSCOPY);  Surgeon: Ellis Montalvo Jr., MD;  Location: [facility];  Service: Endoscopy;   Laterality: N/A;    ROBOT-ASSISTED CHOLECYSTECTOMY N/A 8/7/2024    Procedure: ROBOTIC CHOLECYSTECTOMY;  Surgeon: Luis Antonio Harper MD;  Location: [facility];  Service: General;  Laterality: N/A;     Social History     Socioeconomic History    Marital status:     Number of children: 0   Occupational History     Employer: [employer]   Tobacco Use    Smoking status: Never    Smokeless tobacco: Never   Substance and Sexual Activity    Alcohol use: Yes     Alcohol/week: 0.0 - 1.0 standard drinks of alcohol     Comment: occ     Drug use: Yes     Frequency: 1.0 times per week     Types: Marijuana    Sexual activity: Yes     Partners: Male     Birth control/protection: OCP   Social History Narrative    The patient is single and has no children.  She lives alone.  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     Social Drivers of Health     Financial Resource Strain: Low Risk  (11/28/2023)    Overall Financial Resource Strain (CARDIA)     Difficulty of Paying Living Expenses: Not hard at all   Food Insecurity: No Food Insecurity (9/12/2024)    Received from [facility]    Hunger Vital Sign     Worried About Running Out of Food in the Last Year: Never true     Ran Out of Food in the Last Year: Never true   Transportation Needs: No Transportation Needs (9/12/2024)    Received from [facility]    PRAPARE - Transportation     Lack of Transportation (Medical): No     Lack of Transportation (Non-Medical): No   Physical Activity: Insufficiently Active (11/28/2023)    Exercise Vital Sign     Days of Exercise per Week: 1 day     Minutes of Exercise per Session: 60 min   Stress: No Stress Concern Present (11/28/2023)    [employer] [place] [place] [employer] - Occupational Stress Questionnaire     Feeling of Stress : Only a little   Housing Stability: Low Risk  (11/28/2023)    Housing Stability Vital Sign     Unable to Pay for Housing in the Last Year: No     Number of  Places Lived in the Last Year: 1     Unstable Housing in the Last Year: No     Family History   Problem Relation Name Age of Onset    Fibroids Mother      Breast cancer Maternal Aunt Tip     Cancer Maternal Aunt Tip         Breast    Aortic aneurysm Maternal Aunt Several     Heart disease Maternal Aunt Several     Coronary artery disease Maternal Aunt          S/P stent    Hypertension Maternal Aunt Carmen     Stomach cancer Maternal Uncle      Stomach cancer Maternal Uncle Several     Drug abuse Maternal Uncle Several         Mostly all druga    Heart attack Maternal Uncle      Coronary artery disease Maternal Uncle          S/P stent    Stroke Maternal Uncle Pj     Hypertension Maternal Uncle Hardik     Heart disease Maternal Uncle Many     Heart failure Maternal Grandmother [place]     Stroke Maternal Grandmother [place]     Hypertension Maternal Grandmother [place]     Kidney disease Maternal Grandmother [place]     Heart disease Maternal Grandmother [place]     Colon cancer Paternal Grandfather Hammad     Cancer Paternal Grandfather Hammad         Colon    Esophageal cancer Neg Hx         Review of patient's allergies indicates:  No Known Allergies    Current Outpatient Medications   Medication Sig    EScitalopram oxalate (LEXAPRO) 20 MG tablet Take 1 tablet (20 mg total) by mouth once daily.    hydrOXYzine pamoate (VISTARIL) 25 MG Cap Take 25 mg by mouth every 4 (four) hours as needed.    ibuprofen (ADVIL,MOTRIN) 800 MG tablet Take 1 tablet (800 mg total) by mouth 3 (three) times daily as needed for Pain (with food).    metFORMIN (GLUCOPHAGE-XR) 500 MG ER 24hr tablet Take 1 tablet (500 mg total) by mouth daily with breakfast.    pantoprazole (PROTONIX) 40 MG tablet Take 1 tablet (40 mg total) by mouth once daily.    phentermine (ADIPEX-P) 37.5 mg tablet Take 37.5 mg by mouth before breakfast.    cyclobenzaprine (FLEXERIL) 10 MG tablet Take 1/2 to 1 tab qhs PRN muscle spasms. May cause drowsiness.    elagolix 150 mg  "Tab Take 150 mg by mouth once daily.    gabapentin (NEURONTIN) 100 MG capsule Take 1 capsule (100 mg total) by mouth 3 (three) times daily.    MARY ELLEN FE 1/20, 28, 1 mg-20 mcg (21)/75 mg (7) per tablet Take 1 tablet by mouth Daily.    traZODone (DESYREL) 50 MG tablet Take 1 tablet (50 mg total) by mouth every evening for 14 days, THEN 2 tablets (100 mg total) every evening for 14 days.     No current facility-administered medications for this visit.       Review of Systems     GENERAL:  No weight loss, malaise or fevers.  HEENT:   No recent changes in vision or hearing  NECK:  Negative for lumps, no difficulty with swallowing.  RESPIRATORY:  Negative for cough, wheezing or shortness of breath, patient denies any recent URI.  CARDIOVASCULAR:  Negative for chest pain, leg swelling or palpitations.  GI:  Negative for abdominal discomfort, blood in stools or black stools or change in bowel habits.  MUSCULOSKELETAL:  See HPI.  SKIN:  Negative for lesions, rash, and itching.  PSYCH:  No mood disorder or recent psychosocial stressors.  Patients sleep is not disturbed secondary to pain.  HEMATOLOGY/LYMPHOLOGY:  Negative for prolonged bleeding, bruising easily or swollen nodes.  Patient is not currently taking any anti-coagulants  NEURO:   No history of headaches, syncope, paralysis, seizures or tremors.  All other reviewed and negative other than HPI.    OBJECTIVE:    /83   Pulse 81   Resp 17   Ht 4' 11" (1.499 m)   Wt 93.3 kg (205 lb 11.2 oz)   BMI 41.55 kg/m²         Physical Exam    GENERAL: Well appearing, in no acute distress, alert and oriented x3.  Morbidly obese.  PSYCH:  Mood and affect appropriate.  SKIN: Skin color, texture, turgor normal, no rashes or lesions.  HEAD/FACE:  Normocephalic, atraumatic. Cranial nerves grossly intact.  CV: RRR with palpation of the radial artery.  PULM: No evidence of respiratory difficulty, symmetric chest rise.  GI:  Soft and non-tender.  BACK: Straight leg raising in the " sitting and supine positions is negative to radicular pain. No pain to palpation over the facet joints of the lumbar spine or spinous processes. Normal range of motion without pain reproduction.   EXTREMITIES:  No deformities, edema, or skin discoloration. Good capillary refill.  MUSCULOSKELETAL:  Hip and knee provocative maneuvers are negative.  There is no pain with palpation over the sacroiliac joints bilaterally.  FABERs test is negative.  FADIRs test is negative.   Bilateral upper and lower extremity strength is normal and symmetric.  No atrophy or tone abnormalities are noted.  NEURO: Bilateral upper and lower extremity coordination and muscle stretch reflexes are physiologic and symmetric.  Plantar response are downgoing. No clonus.  No loss of sensation is noted.  GAIT: normal.      LABS:  Lab Results   Component Value Date    WBC 7.02 08/07/2024    HGB 12.9 08/07/2024    HCT 38.6 08/07/2024    MCV 87 08/07/2024     08/07/2024       CMP  Sodium   Date Value Ref Range Status   08/07/2024 136 136 - 145 mmol/L Final     Potassium   Date Value Ref Range Status   08/07/2024 3.9 3.5 - 5.1 mmol/L Final     Chloride   Date Value Ref Range Status   08/07/2024 106 95 - 110 mmol/L Final     CO2   Date Value Ref Range Status   08/07/2024 19 (L) 23 - 29 mmol/L Final     Glucose   Date Value Ref Range Status   08/07/2024 104 70 - 110 mg/dL Final     BUN   Date Value Ref Range Status   08/07/2024 10 6 - 20 mg/dL Final     Creatinine   Date Value Ref Range Status   08/07/2024 0.7 0.5 - 1.4 mg/dL Final     Calcium   Date Value Ref Range Status   08/07/2024 9.1 8.7 - 10.5 mg/dL Final     Total Protein   Date Value Ref Range Status   07/02/2024 7.6 6.0 - 8.4 g/dL Final     Albumin   Date Value Ref Range Status   07/02/2024 3.4 (L) 3.5 - 5.2 g/dL Final     Total Bilirubin   Date Value Ref Range Status   07/02/2024 1.0 0.1 - 1.0 mg/dL Final     Comment:     For infants and newborns, interpretation of results should be  based  on gestational age, weight and in agreement with clinical  observations.    Premature Infant recommended reference ranges:  Up to 24 hours.............<8.0 mg/dL  Up to 48 hours............<12.0 mg/dL  3-5 days..................<15.0 mg/dL  6-29 days.................<15.0 mg/dL       Alkaline Phosphatase   Date Value Ref Range Status   07/02/2024 125 55 - 135 U/L Final     AST   Date Value Ref Range Status   07/02/2024 23 10 - 40 U/L Final     ALT   Date Value Ref Range Status   07/02/2024 30 10 - 44 U/L Final     Anion Gap   Date Value Ref Range Status   08/07/2024 11 8 - 16 mmol/L Final     eGFR if    Date Value Ref Range Status   11/03/2020 >60.0 >60 mL/min/1.73 m^2 Final     eGFR if non    Date Value Ref Range Status   11/03/2020 >60.0 >60 mL/min/1.73 m^2 Final     Comment:     Calculation used to obtain the estimated glomerular filtration  rate (eGFR) is the CKD-EPI equation.          Lab Results   Component Value Date    HGBA1C 5.6 10/14/2024             ASSESSMENT: 34 y.o. year old female with lower back pain, consistent with     1. Chronic myofascial pain  Ambulatory Referral/Consult to Physical Therapy      2. Morbid obesity with BMI of 40.0-44.9, adult              PLAN:   - Interventions:  None at this time    - Anticoagulation use:  None      - Medications: I have stressed the importance of physical activity and a home exercise plan to help with pain and improve health. and Patient can continue with medications for now since they are providing benefits, using them appropriately, and without side effects.     Provide Flexeril 5-10 mg nightly as needed            - Therapy: Referral to Physical therapy for Lumbar stabilization, core strengthening, and a home exercise program.    - Psychological:  Discussed coping mechanisms to help address chronic pain issues and provided reassurance set her lumbar spine is anatomically normal    - Labs:  Reviewed    - Imaging:  Reviewed available imaging with patient and answered any questions they had regarding study.    - Consults/Referrals:  Physical therapy    - Records:  Reviewed/Obtain old records from outside physicians and imaging    - Follow up visit: return to clinic as needed    - Counseled patient regarding the importance of activity modification and physical therapy    - This condition does not require this patient to take time off of work, and the primary goal of our Pain Management services is to improve the patient's functional capacity.    - Patient Questions: Answered all of the patient's questions regarding diagnosis, therapy, and treatment        The above plan and management options were discussed at length with patient. Patient is in agreement with the above and verbalized understanding.    I discussed the goals of interventional chronic pain management with the patient on today's visit.  I explained the utility of injections for diagnostic and therapeutic purposes.  We discussed a multimodal approach to pain including treating the patient's given worst pain at any given time.  We will use a systematic approach to addressing pain.  We will also adopt a multimodal approach that includes injections, adjuvant medications, physical therapy, at times psychiatry.  There may be a limited role for opioid use intermittently in the treatment of pain, more particularly for acute pain although no one approach can be used as a sole treatment modality.    I emphasized the importance of regular exercise, core strengthening and stretching, diet and weight loss as a cornerstone of long-term pain management.      Maximiliano Chandler MD  Interventional Pain Management  [facility]    Disclaimer:  This note was prepared using voice recognition system and is likely to have sound alike errors that may have been overlooked even after proof reading.  Please call me with any questions

## 2025-04-30 ENCOUNTER — OFFICE VISIT (OUTPATIENT)
Dept: PRIMARY CARE CLINIC | Facility: CLINIC | Age: 35
End: 2025-04-30
Payer: COMMERCIAL

## 2025-04-30 VITALS
BODY MASS INDEX: 59.07 KG/M2 | SYSTOLIC BLOOD PRESSURE: 138 MMHG | OXYGEN SATURATION: 98 % | TEMPERATURE: 98 F | WEIGHT: 293 LBS | HEIGHT: 59 IN | HEART RATE: 90 BPM | DIASTOLIC BLOOD PRESSURE: 82 MMHG

## 2025-04-30 DIAGNOSIS — M54.42 ACUTE LEFT-SIDED LOW BACK PAIN WITH LEFT-SIDED SCIATICA: ICD-10-CM

## 2025-04-30 DIAGNOSIS — M25.531 PAIN IN BOTH WRISTS: ICD-10-CM

## 2025-04-30 DIAGNOSIS — M25.532 PAIN IN BOTH WRISTS: ICD-10-CM
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
## 2025-04-30 PROCEDURE — 99999 PR PBB SHADOW E&M-EST. PATIENT-LVL IV: CPT | Mod: PBBFAC,,, | Performed by: INTERNAL MEDICINE

## 2025-04-30 RX ORDER — DICLOFENAC SODIUM 75 MG/1
75 TABLET, DELAYED RELEASE ORAL 2 TIMES DAILY
COMMUNITY

## 2025-04-30 RX ORDER — NORGESTIMATE AND ETHINYL ESTRADIOL 0.25-0.035
1 KIT ORAL DAILY
COMMUNITY
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
## 2025-05-07 ENCOUNTER — PATIENT MESSAGE (OUTPATIENT)
Dept: PRIMARY CARE CLINIC | Facility: CLINIC | Age: 35
End: 2025-05-07
Payer: COMMERCIAL
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
## 2025-05-07 DIAGNOSIS — L30.9 ECZEMA, UNSPECIFIED TYPE: ICD-10-CM

## 2025-05-23 ENCOUNTER — E-VISIT (OUTPATIENT)
Dept: FAMILY MEDICINE | Facility: CLINIC | Age: 35
End: 2025-05-23
Payer: COMMERCIAL

## 2025-05-23 DIAGNOSIS — L30.9 ECZEMA, UNSPECIFIED TYPE: Primary | ICD-10-CM

## 2025-05-23 RX ORDER — TRIAMCINOLONE ACETONIDE 1 MG/G
CREAM TOPICAL 2 TIMES DAILY
Qty: 80 G | Refills: 0 | Status: SHIPPED | OUTPATIENT
Start: 2025-05-23 | End: 2025-05-23 | Stop reason: SDUPTHER

## 2025-05-23 RX ORDER — TRIAMCINOLONE ACETONIDE 1 MG/G
CREAM TOPICAL 2 TIMES DAILY
Qty: 80 G | Refills: 0 | Status: SHIPPED | OUTPATIENT
Start: 2025-05-23

## 2025-05-23 NOTE — TELEPHONE ENCOUNTER
I have signed for the following orders AND/OR meds.  Please call the patient and ask the patient to schedule the testing AND/OR inform about any medications that were sent. Medications have been sent to pharmacy listed below      Orders Placed This Encounter   Procedures    X-Ray Hand Complete Left     Standing Status:   Future     Expected Date:   5/23/2025     Expiration Date:   5/23/2026     May the Radiologist modify the order per protocol to meet the clinical needs of the patient?:   Yes     Release to patient:   Immediate    X-ray Shoulder 2 or More Views Right     Standing Status:   Future     Expected Date:   5/23/2025     Expiration Date:   5/23/2026     May the Radiologist modify the order per protocol to meet the clinical needs of the patient?:   Yes     Release to patient:   Immediate    X-Ray Lumbar Spine AP And Lateral     Standing Status:   Future     Expected Date:   5/23/2025     Expiration Date:   5/23/2026     May the Radiologist modify the order per protocol to meet the clinical needs of the patient?:   Yes       Medications Ordered This Encounter   Medications    triamcinolone acetonide 0.1% (KENALOG) 0.1 % cream     Sig: Apply topically 2 (two) times daily.     Dispense:  80 g     Refill:  0         [facility] [address] [place] [address] [address] [address]  Phone: [phone] Fax: [phone]    [facility] [address] [place] [address]  Phone: [phone] Fax: [phone]    [facility] - [facility] [address] [address]  Phone: [phone] Fax: [phone]

## 2025-05-23 NOTE — PROGRESS NOTES
Patient ID: Rebecca Blankenship is a 34 y.o. female.        E-Visit Time Tracking:   Day 1 Time (in minutes): 12  Total Time (in minutes): 12      Chief Complaint: General Illness (Entered automatically based on patient selection in [employer].)      The patient initiated a request through [employer] on 5/23/2025 for evaluation and management with a chief complaint of General Illness (Entered automatically based on patient selection in [employer].)     I evaluated the questionnaire submission on 05/23/2025.    [facility] P[facility] Evisit [facility]group-Medication    5/23/2025 10:11 AM CDT - Filed by Patient   What do you need help with? Medication Request   Do you agree to participate in an E-Visit? Yes   If you have any of the following symptoms, please present to your local emergency room or call 911:  I acknowledge   Medication requests for narcotics will not be addressed via an E-Visit.  Please schedule an appointment. I acknowledge   Do you have any of the following pregnancy-related conditions? None   Do you want to address a new or existing medication? I would like to address a medication I currently take   What is the main issue you would like addressed today? Refill of  triamcinolone acetonide cream for ezcema   Would you like to change or continue your medication? Continue medication   What medication would you like to continue?  triamcinolone acetonide cream   Are you taking it as prescribed? Yes    What medical condition is the  medication intended to treat? Eczema   Is the medication helping your condition? Yes   Are you having any side effects from the medication? No   Provide any additional information you feel is important.    Please attach any relevant images or files    Are you able to take your vital signs? No         Encounter Diagnosis   Name Primary?    Eczema, unspecified type Yes        No orders of the defined types were placed in this encounter.     Medications Ordered This Encounter   Medications     triamcinolone acetonide 0.1% (KENALOG) 0.1 % cream     Sig: Apply topically 2 (two) times daily.     Dispense:  80 g     Refill:  0        No follow-ups on file.

## 2025-06-04 ENCOUNTER — PATIENT MESSAGE (OUTPATIENT)
Dept: PRIMARY CARE CLINIC | Facility: CLINIC | Age: 35
End: 2025-06-04
Payer: COMMERCIAL

## 2025-06-04 DIAGNOSIS — M25.551 BILATERAL HIP PAIN: Primary | ICD-10-CM

## 2025-06-04 DIAGNOSIS — M25.552 BILATERAL HIP PAIN: Primary | ICD-10-CM

## 2025-06-06 ENCOUNTER — HOSPITAL ENCOUNTER (OUTPATIENT)
Dept: RADIOLOGY | Facility: HOSPITAL | Age: 35
Discharge: HOME | End: 2025-06-06
Attending: INTERNAL MEDICINE
Payer: COMMERCIAL

## 2025-06-06 ENCOUNTER — RESULTS FOLLOW-UP (OUTPATIENT)
Dept: FAMILY MEDICINE | Facility: CLINIC | Age: 35
End: 2025-06-06

## 2025-06-06 DIAGNOSIS — M25.551 BILATERAL HIP PAIN: ICD-10-CM
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
## 2025-06-06 DIAGNOSIS — M25.552 BILATERAL HIP PAIN: ICD-10-CM

## 2025-06-06 PROCEDURE — 73030 X-RAY EXAM OF SHOULDER: CPT | Mod: TC,PO,RT

## 2025-06-06 PROCEDURE — 72100 X-RAY EXAM L-S SPINE 2/3 VWS: CPT | Mod: TC,PO

## 2025-06-06 PROCEDURE — 73030 X-RAY EXAM OF SHOULDER: CPT | Mod: 26,RT,, | Performed by: RADIOLOGY

## 2025-06-06 PROCEDURE — 73521 X-RAY EXAM HIPS BI 2 VIEWS: CPT | Mod: TC,PO

## 2025-06-06 PROCEDURE — 73130 X-RAY EXAM OF HAND: CPT | Mod: TC,PO,LT

## 2025-06-06 PROCEDURE — 73130 X-RAY EXAM OF HAND: CPT | Mod: 26,LT,, | Performed by: RADIOLOGY

## 2025-06-06 PROCEDURE — 73521 X-RAY EXAM HIPS BI 2 VIEWS: CPT | Mod: 26,,, | Performed by: RADIOLOGY

## 2025-06-06 PROCEDURE — 72100 X-RAY EXAM L-S SPINE 2/3 VWS: CPT | Mod: 26,,, | Performed by: RADIOLOGY

## 2025-06-06 NOTE — PROGRESS NOTES
Results have been released via [employer]. Please verify that these have been viewed by patient. If not, please call patient with results.     I have sent a message to them with the following interpretation (see below).    I have reviewed your recent bilateral hip XR which showed degenerative changes otherwise there were no acute findings or significant abnormalities seen. Lumbar XR also showed degenerative changes as well as your R shoulder XR showed mild AC joint degenerative changes. Your L hand XR was normal.     Please do not hesitate to call or message with any additional questions or concerns.    Judy Navarro PA-C

## 2025-06-12 ENCOUNTER — OFFICE VISIT (OUTPATIENT)
Dept: FAMILY MEDICINE | Facility: CLINIC | Age: 35
End: 2025-06-12
Payer: COMMERCIAL

## 2025-06-12 ENCOUNTER — LAB VISIT (OUTPATIENT)
Dept: LAB | Facility: HOSPITAL | Age: 35
End: 2025-06-12
Attending: PHYSICIAN ASSISTANT
Payer: COMMERCIAL

## 2025-06-12 VITALS
HEIGHT: 59 IN | DIASTOLIC BLOOD PRESSURE: 82 MMHG | HEART RATE: 76 BPM | SYSTOLIC BLOOD PRESSURE: 138 MMHG | WEIGHT: 293 LBS | RESPIRATION RATE: 16 BRPM | BODY MASS INDEX: 59.07 KG/M2 | OXYGEN SATURATION: 98 %

## 2025-06-12 DIAGNOSIS — R03.0 ELEVATED BP WITHOUT DIAGNOSIS OF HYPERTENSION: Primary | ICD-10-CM

## 2025-06-12 DIAGNOSIS — E66.01 MORBID OBESITY WITH BMI OF 50.0-59.9, ADULT: ICD-10-CM

## 2025-06-12 DIAGNOSIS — R73.03 PREDIABETES: ICD-10-CM

## 2025-06-12 DIAGNOSIS — R19.7 DIARRHEA, UNSPECIFIED TYPE: ICD-10-CM

## 2025-06-12 DIAGNOSIS — R03.0 ELEVATED BP WITHOUT DIAGNOSIS OF HYPERTENSION: ICD-10-CM

## 2025-06-12 LAB
ABSOLUTE EOSINOPHIL (OHS): 0.03 K/UL
ABSOLUTE MONOCYTE (OHS): 0.68 K/UL (ref 0.3–1)
ABSOLUTE NEUTROPHIL COUNT (OHS): 4.69 K/UL (ref 1.8–7.7)
ALBUMIN SERPL BCP-MCNC: 3.7 G/DL (ref 3.5–5.2)
ALP SERPL-CCNC: 117 UNIT/L (ref 40–150)
ALT SERPL W/O P-5'-P-CCNC: 26 UNIT/L (ref 10–44)
ANION GAP (OHS): 11 MMOL/L (ref 8–16)
AST SERPL-CCNC: 25 UNIT/L (ref 11–45)
BASOPHILS # BLD AUTO: 0.02 K/UL
BASOPHILS NFR BLD AUTO: 0.3 %
BILIRUB SERPL-MCNC: 0.7 MG/DL (ref 0.1–1)
BUN SERPL-MCNC: 11 MG/DL (ref 6–20)
CALCIUM SERPL-MCNC: 8.8 MG/DL (ref 8.7–10.5)
CHLORIDE SERPL-SCNC: 105 MMOL/L (ref 95–110)
CO2 SERPL-SCNC: 24 MMOL/L (ref 23–29)
CREAT SERPL-MCNC: 0.7 MG/DL (ref 0.5–1.4)
EAG (OHS): 114 MG/DL (ref 68–131)
ERYTHROCYTE [DISTWIDTH] IN BLOOD BY AUTOMATED COUNT: 13.3 % (ref 11.5–14.5)
GFR SERPLBLD CREATININE-BSD FMLA CKD-EPI: >60 ML/MIN/1.73/M2
GLUCOSE SERPL-MCNC: 76 MG/DL (ref 70–110)
HBA1C MFR BLD: 5.6 % (ref 4–5.6)
HCT VFR BLD AUTO: 39.8 % (ref 37–48.5)
HGB BLD-MCNC: 12.9 GM/DL (ref 12–16)
IMM GRANULOCYTES # BLD AUTO: 0.02 K/UL (ref 0–0.04)
IMM GRANULOCYTES NFR BLD AUTO: 0.3 % (ref 0–0.5)
LYMPHOCYTES # BLD AUTO: 2.41 K/UL (ref 1–4.8)
MCH RBC QN AUTO: 29.3 PG (ref 27–31)
MCHC RBC AUTO-ENTMCNC: 32.4 G/DL (ref 32–36)
MCV RBC AUTO: 90 FL (ref 82–98)
NUCLEATED RBC (/100WBC) (OHS): 0 /100 WBC
PLATELET # BLD AUTO: 319 K/UL (ref 150–450)
PMV BLD AUTO: 10 FL (ref 9.2–12.9)
POTASSIUM SERPL-SCNC: 3.8 MMOL/L (ref 3.5–5.1)
PROT SERPL-MCNC: 7.5 GM/DL (ref 6–8.4)
RBC # BLD AUTO: 4.41 M/UL (ref 4–5.4)
RELATIVE EOSINOPHIL (OHS): 0.4 %
RELATIVE LYMPHOCYTE (OHS): 30.7 % (ref 18–48)
RELATIVE MONOCYTE (OHS): 8.7 % (ref 4–15)
RELATIVE NEUTROPHIL (OHS): 59.6 % (ref 38–73)
SODIUM SERPL-SCNC: 140 MMOL/L (ref 136–145)
TSH SERPL-ACNC: 2.19 UIU/ML (ref 0.4–4)
WBC # BLD AUTO: 7.85 K/UL (ref 3.9–12.7)

## 2025-06-12 PROCEDURE — 3008F BODY MASS INDEX DOCD: CPT | Mod: CPTII,S$GLB,, | Performed by: PHYSICIAN ASSISTANT

## 2025-06-12 PROCEDURE — 3075F SYST BP GE 130 - 139MM HG: CPT | Mod: CPTII,S$GLB,, | Performed by: PHYSICIAN ASSISTANT

## 2025-06-12 PROCEDURE — 36415 COLL VENOUS BLD VENIPUNCTURE: CPT | Mod: PO

## 2025-06-12 PROCEDURE — 99214 OFFICE O/P EST MOD 30 MIN: CPT | Mod: S$GLB,,, | Performed by: PHYSICIAN ASSISTANT

## 2025-06-12 PROCEDURE — 85025 COMPLETE CBC W/AUTO DIFF WBC: CPT

## 2025-06-12 PROCEDURE — 99999 PR PBB SHADOW E&M-EST. PATIENT-LVL V: CPT | Mod: PBBFAC,,, | Performed by: PHYSICIAN ASSISTANT

## 2025-06-12 PROCEDURE — 93005 ELECTROCARDIOGRAM TRACING: CPT | Mod: S$GLB,,, | Performed by: PHYSICIAN ASSISTANT

## 2025-06-12 PROCEDURE — 84443 ASSAY THYROID STIM HORMONE: CPT

## 2025-06-12 PROCEDURE — 80053 COMPREHEN METABOLIC PANEL: CPT

## 2025-06-12 PROCEDURE — 81002 URINALYSIS NONAUTO W/O SCOPE: CPT | Mod: PO | Performed by: PHYSICIAN ASSISTANT

## 2025-06-12 PROCEDURE — 3079F DIAST BP 80-89 MM HG: CPT | Mod: CPTII,S$GLB,, | Performed by: PHYSICIAN ASSISTANT

## 2025-06-12 PROCEDURE — 1160F RVW MEDS BY RX/DR IN RCRD: CPT | Mod: CPTII,S$GLB,, | Performed by: PHYSICIAN ASSISTANT

## 2025-06-12 PROCEDURE — 83036 HEMOGLOBIN GLYCOSYLATED A1C: CPT

## 2025-06-12 PROCEDURE — 1159F MED LIST DOCD IN RCRD: CPT | Mod: CPTII,S$GLB,, | Performed by: PHYSICIAN ASSISTANT

## 2025-06-12 PROCEDURE — G2211 COMPLEX E/M VISIT ADD ON: HCPCS | Mod: S$GLB,,, | Performed by: PHYSICIAN ASSISTANT

## 2025-06-12 PROCEDURE — 93010 ELECTROCARDIOGRAM REPORT: CPT | Mod: S$GLB,,, | Performed by: INTERNAL MEDICINE

## 2025-06-12 RX ORDER — DICYCLOMINE HYDROCHLORIDE 20 MG/1
20 TABLET ORAL 3 TIMES DAILY
COMMUNITY
Start: 2025-06-11

## 2025-06-12 NOTE — PROGRESS NOTES
Assessment/Plan:    1. Elevated BP without diagnosis of hypertension  Overview:  -BP at goal today; has been above goal on recent home checks  -checking labs, UA and EKG today  -monitor BP closely at home; BP check in 1-2 weeks  -continue lifestyle modification with low sodium diet and exercise   -discussed hypertension disease course and importance of treating high blood pressure  -patient understood and advised of risk of untreated blood pressure. ER precautions were given for symptoms of hypertensive urgency and emergency.    Orders:  -     CBC Auto Differential; Future; Expected date: 06/12/2025  -     Comprehensive Metabolic Panel; Future; Expected date: 06/12/2025  -     TSH; Future; Expected date: 06/12/2025  -     IN OFFICE EKG 12-LEAD (to Muse)  -     Urinalysis, Reflex to Urine Culture Urine, Clean Catch    2. Diarrhea, unspecified type  Overview:  -diarrhea >1 week  -will obtain stool studies  -recommend increasing fluid intake and bland diet  -follow up if no improvement in symptoms  -ER precautions for severe or worsening of symptoms     Orders:  -     Clostridioides difficile EIA; Future; Expected date: 06/12/2025  -     Giardia / Cryptosporidum, EIA; Future; Expected date: 06/12/2025  -     Stool culture; Future; Expected date: 06/12/2025  -     WBC, Stool; Future; Expected date: 06/12/2025  -     Stool Exam-Ova,Cysts,Parasites; Future; Expected date: 06/12/2025    3. Prediabetes  Overview:  Lab Results   Component Value Date    HGBA1C 5.6 10/14/2024   -due for repeat A1C      Orders:  -     Hemoglobin A1C; Future; Expected date: 06/12/2025    4. Morbid obesity with BMI of 50.0-59.9, adult  Overview:  General weight loss/lifestyle modification strategies discussed (elicit support from others; identify saboteurs; non-food rewards, etc).  Informal exercise measures discussed, e.g. taking stairs instead of elevator.  Regular aerobic exercise program discussed.   Medication: Adipex  Unable to get GLP1 due  to insurance issues  Wt Readings from Last 3 Encounters:   06/12/25 1430 134.1 kg (295 lb 9.6 oz)   04/30/25 1108 133 kg (293 lb 5.2 oz)   12/13/24 1058 93.3 kg (205 lb 11.2 oz)         Visit today included increased complexity associated with the care of the episodic problem addressed and managing the longitudinal care of the patient due to the serious and/or complex managed problem(s).    Follow up in about 1 week (around 6/19/2025), or if symptoms worsen or fail to improve.    Judy Navarro PA-C  _____________________________________________________________________________________________________________________________________________________    CC: elevated blood pressure    HPI: Patient is in clinic today as an established patient here for elevated blood pressure.    HYPERTENSION:  She first noticed elevated blood pressure at workplace clinic last Thursday during health exam. Multiple blood pressure readings have been documented: 140s/90s at work last week, 140s/90s at urgent care, and home readings ranging from 142/97 to 215/128. She reports associated symptoms of headache. Denies chest pain, palpitations, shortness of breath, dyspnea on exertion, left arm or neck pain, nausea, vomiting, diaphoresis, PND and orthopnea. The patient does not have a history of hypertension and is not on medications that would increase the blood pressure at this time.    GASTROINTESTINAL:  She reports watery diarrhea with multiple episodes occurring daily for the past week, with onset shortly after returning home from a cruise. She experienced one episode of vomiting on Sunday (4 days ago). She denies any current fever, nausea, vomiting, abdominal pain or bloody/black stool. She visited urgent care last night where she received a GI cocktail and was prescribed dicyclomine for her GI symptoms.    No other complaints today.     Past Medical History:   Diagnosis Date    Anxiety     Fatty liver     Gastroesophageal reflux disease  11/20/2015    Hepatomegaly     Insulin resistance     Iron deficiency anemia     Morbid obesity with BMI of 50.0-59.9, adult     Oligomenorrhea      Past Surgical History:   Procedure Laterality Date    CHOLECYSTECTOMY  8/7/2024    ESOPHAGOGASTRODUODENOSCOPY N/A 07/05/2024    Procedure: EGD (ESOPHAGOGASTRODUODENOSCOPY);  Surgeon: Ellis Montalvo Jr., MD;  Location: [facility];  Service: Endoscopy;  Laterality: N/A;    ROBOT-ASSISTED CHOLECYSTECTOMY N/A 08/07/2024    Procedure: ROBOTIC CHOLECYSTECTOMY;  Surgeon: Luis Antonio Harper MD;  Location: [facility];  Service: General;  Laterality: N/A;     Review of patient's allergies indicates:  No Known Allergies  Social History[1]  Family History   Problem Relation Name Age of Onset    Fibroids Mother      Breast cancer Maternal Aunt Tip     Cancer Maternal Aunt Tip         Breast    Aortic aneurysm Maternal Aunt Several     Heart disease Maternal Aunt Several     Coronary artery disease Maternal Aunt          S/P stent    Hypertension Maternal Aunt Carmen     Stomach cancer Maternal Uncle      Stomach cancer Maternal Uncle Several     Drug abuse Maternal Uncle Several         Mostly all druga    Heart attack Maternal Uncle      Coronary artery disease Maternal Uncle          S/P stent    Stroke Maternal Uncle Pj     Hypertension Maternal Uncle Hardik     Heart disease Maternal Uncle Many     Heart failure Maternal Grandmother Chester     Stroke Maternal Grandmother Chester     Hypertension Maternal Grandmother Chester     Kidney disease Maternal Grandmother Chester     Heart disease Maternal Grandmother Chester     Colon cancer Paternal Grandfather Hammad     Cancer Paternal Grandfather Hammad         Colon    Heart disease Maternal Uncle Many     Esophageal cancer Neg Hx       Medications Ordered Prior to Encounter[2]    Review of Systems   Constitutional:  Negative for chills, diaphoresis, fatigue and fever.   HENT:  Negative for congestion, ear pain, postnasal drip, sinus pain  "and sore throat.    Eyes:  Negative for pain and redness.   Respiratory:  Negative for cough, chest tightness and shortness of breath.    Cardiovascular:  Negative for chest pain and leg swelling.   Gastrointestinal:  Positive for diarrhea. Negative for abdominal pain, constipation, nausea and vomiting.   Genitourinary:  Negative for dysuria and hematuria.   Musculoskeletal:  Negative for arthralgias and joint swelling.   Skin:  Negative for rash.   Neurological:  Positive for headaches. Negative for dizziness and syncope.   Psychiatric/Behavioral:  Negative for dysphoric mood. The patient is not nervous/anxious.        Vitals:    06/12/25 1430   BP: 138/82   Pulse: 76   Resp: 16   SpO2: 98%   Weight: 134.1 kg (295 lb 9.6 oz)   Height: 4' 11" (1.499 m)       Wt Readings from Last 3 Encounters:   06/12/25 134.1 kg (295 lb 9.6 oz)   04/30/25 133 kg (293 lb 5.2 oz)   12/13/24 93.3 kg (205 lb 11.2 oz)       Physical Exam  Constitutional:       General: She is not in acute distress.     Appearance: Normal appearance. She is well-developed.   HENT:      Head: Normocephalic and atraumatic.   Eyes:      Conjunctiva/sclera: Conjunctivae normal.   Cardiovascular:      Rate and Rhythm: Normal rate and regular rhythm.      Pulses: Normal pulses.      Heart sounds: Normal heart sounds. No murmur heard.  Pulmonary:      Effort: Pulmonary effort is normal. No respiratory distress.      Breath sounds: Normal breath sounds.   Abdominal:      General: Bowel sounds are normal. There is no distension.      Palpations: Abdomen is soft.      Tenderness: There is no abdominal tenderness.   Musculoskeletal:         General: Normal range of motion.      Cervical back: Normal range of motion and neck supple.   Skin:     General: Skin is warm and dry.      Findings: No rash.   Neurological:      General: No focal deficit present.      Mental Status: She is alert and oriented to person, place, and time.   Psychiatric:         Mood and Affect: " Mood normal.         Behavior: Behavior normal.         Health Maintenance   Topic Date Due    COVID-19 Vaccine (5 - 2024-25 season) 09/01/2024    Hemoglobin A1c (Prediabetes)  10/14/2025    Cervical Cancer Screening  10/22/2029    TETANUS VACCINE  02/01/2031    RSV Vaccine (Age 60+ and Pregnant patients) (1 - 1-dose 75+ series) 08/13/2065    Hepatitis C Screening  Completed    Influenza Vaccine  Completed    HIV Screening  Completed    Lipid Panel  Completed    Pneumococcal Vaccines (Age 0-49)  Aged Out     DISCLAIMER: This note was compiled by using a speech recognition dictation system and therefore please be aware that typographical / speech recognition errors can and do occur.  Please contact me if you see any errors specifically.  Consent was obtained for [employer] recording system prior to the visit.          [1]   Social History  Tobacco Use    Smoking status: Never    Smokeless tobacco: Never   Substance Use Topics    Alcohol use: Yes     Alcohol/week: 1.0 - 2.0 standard drink of alcohol     Types: 1 Shots of liquor per week     Comment: occ     Drug use: Not Currently     Frequency: 1.0 times per week     Types: Marijuana   [2]   Current Outpatient Medications on File Prior to Visit   Medication Sig Dispense Refill    cyclobenzaprine (FLEXERIL) 10 MG tablet Take 1/2 to 1 tab qhs PRN muscle spasms. May cause drowsiness. 60 tablet 0    diclofenac (VOLTAREN) 75 MG EC tablet Take 75 mg by mouth 2 (two) times daily.      dicyclomine (BENTYL) 20 mg tablet Take 20 mg by mouth 3 (three) times daily.      EScitalopram oxalate (LEXAPRO) 20 MG tablet Take 1 tablet (20 mg total) by mouth once daily. 90 tablet 3    hydrOXYzine pamoate (VISTARIL) 25 MG Cap Take 25 mg by mouth every 4 (four) hours as needed.      metFORMIN (GLUCOPHAGE-XR) 500 MG ER 24hr tablet Take 1 tablet (500 mg total) by mouth daily with breakfast. 90 tablet 3    pantoprazole (PROTONIX) 40 MG tablet Take 1 tablet (40 mg total) by mouth once daily.  90 tablet 3    phentermine (ADIPEX-P) 37.5 mg tablet Take 1 tablet (37.5 mg total) by mouth before breakfast. 30 tablet 3    SPRINTEC, 28, 0.25-0.035 mg per tablet Take 1 tablet by mouth once daily.      triamcinolone acetonide 0.1% (KENALOG) 0.1 % cream Apply topically 2 (two) times daily. 80 g 0    gabapentin (NEURONTIN) 100 MG capsule Take 1 capsule (100 mg total) by mouth 3 (three) times daily. 90 capsule 2     No current facility-administered medications on file prior to visit.

## 2025-06-13 ENCOUNTER — RESULTS FOLLOW-UP (OUTPATIENT)
Dept: FAMILY MEDICINE | Facility: CLINIC | Age: 35
End: 2025-06-13

## 2025-06-13 ENCOUNTER — LAB VISIT (OUTPATIENT)
Dept: LAB | Facility: HOSPITAL | Age: 35
End: 2025-06-13
Attending: PHYSICIAN ASSISTANT
Payer: COMMERCIAL

## 2025-06-13 ENCOUNTER — PATIENT MESSAGE (OUTPATIENT)
Dept: FAMILY MEDICINE | Facility: CLINIC | Age: 35
End: 2025-06-13
Payer: COMMERCIAL

## 2025-06-13 DIAGNOSIS — R94.31 ABNORMAL EKG: Primary | ICD-10-CM

## 2025-06-13 DIAGNOSIS — R19.7 DIARRHEA, UNSPECIFIED TYPE: ICD-10-CM

## 2025-06-13 LAB
BILIRUB UR QL STRIP.AUTO: NEGATIVE
CLARITY UR: CLEAR
COLOR UR AUTO: YELLOW
GLUCOSE UR QL STRIP: NEGATIVE
HGB UR QL STRIP: ABNORMAL
HOLD SPECIMEN: NORMAL
KETONES UR QL STRIP: NEGATIVE
LEUKOCYTE ESTERASE UR QL STRIP: NEGATIVE
NITRITE UR QL STRIP: NEGATIVE
OHS QRS DURATION: 90 MS
OHS QTC CALCULATION: 470 MS
PH UR STRIP: 8 [PH]
PROT UR QL STRIP: NEGATIVE
SP GR UR STRIP: 1.01

## 2025-06-13 PROCEDURE — 87427 SHIGA-LIKE TOXIN AG IA: CPT

## 2025-06-13 PROCEDURE — 87329 GIARDIA AG IA: CPT

## 2025-06-13 PROCEDURE — 89055 LEUKOCYTE ASSESSMENT FECAL: CPT

## 2025-06-13 PROCEDURE — 87324 CLOSTRIDIUM AG IA: CPT

## 2025-06-13 PROCEDURE — 87046 STOOL CULTR AEROBIC BACT EA: CPT | Mod: 59

## 2025-06-13 PROCEDURE — 87046 STOOL CULTR AEROBIC BACT EA: CPT

## 2025-06-13 PROCEDURE — 87209 SMEAR COMPLEX STAIN: CPT

## 2025-06-13 NOTE — TELEPHONE ENCOUNTER
EKG showed normal sinus rhythm. There were some other abnormalities noted. These do not appear to be worrisome. There is a lot of potential errors w/ EKGs and lead placement, however, I will put in a referral to cardiology just to make ensure no further testing needs to be done.     I have signed for the following orders AND/OR meds.  Please call the patient and ask the patient to schedule the testing AND/OR inform about any medications that were sent. Medications have been sent to pharmacy listed below      Orders Placed This Encounter   Procedures    Ambulatory referral/consult to Cardiology     Standing Status:   Future     Expected Date:   6/20/2025     Expiration Date:   7/13/2026     Referral Priority:   Routine     Referral Type:   Consultation     Referral Reason:   Specialty Services Required     Requested Specialty:   Cardiology     Number of Visits Requested:   1              [facility] - [facility] [address] [facility]  [address] [id number]  Phone: [phone] Fax: [phone]    [facility] [address] [address]  Phone: [phone] Fax: [phone]    Count includes the [facility] - [facility] [address] [address]  Phone: [phone] Fax: [phone]

## 2025-06-14 LAB
C COLI+JEJ+UPSA DNA STL QL NAA+NON-PROBE: NEGATIVE
C DIFF GDH STL QL: NEGATIVE
C DIFF TOX A+B STL QL IA: NEGATIVE
CRYPTOSP AG SPEC QL: NEGATIVE
G LAMBLIA AG STL QL IA: NEGATIVE
WBC #/AREA STL HPF: NORMAL /[HPF]

## 2025-06-15 LAB
E COLI SXT1 STL QL IA: NEGATIVE
E COLI SXT2 STL QL IA: NEGATIVE

## 2025-06-16 LAB — BACTERIA STL CULT: NORMAL

## 2025-06-19 LAB — O+P STL MICRO: NORMAL

## 2025-06-20 ENCOUNTER — TELEPHONE (OUTPATIENT)
Dept: PHARMACY | Facility: CLINIC | Age: 35
End: 2025-06-20
Payer: COMMERCIAL

## 2025-06-20 NOTE — TELEPHONE ENCOUNTER
[facility]/Population Health Chart Review & Patient Outreach Details For Medication Adherence Project    Reason for Outreach Encounter: 3rd Party payor non-compliance report ([employer], [employer], C, etc)  2.  Patient Outreach Method: Reviewed Patient Chart  3.   Medication in question: Metformin ER 500mg   LAST FILLED: 4/15/25 for 90 day supply  Diabetes Medications              metFORMIN (GLUCOPHAGE-XR) 500 MG ER 24hr tablet Take 1 tablet (500 mg total) by mouth daily with breakfast.              4.  Reviewed and or Updates Made To: Patient Chart  5. Outreach Outcomes and/or actions taken: Patient filled medication and is on track to be adherent

## 2025-06-23 ENCOUNTER — PATIENT MESSAGE (OUTPATIENT)
Dept: CARDIOLOGY | Facility: CLINIC | Age: 35
End: 2025-06-23
Payer: COMMERCIAL

## 2025-08-19 RX ORDER — CYCLOBENZAPRINE HCL 10 MG
TABLET ORAL
Qty: 60 TABLET | Refills: 1 | Status: SHIPPED | OUTPATIENT
Start: 2025-08-19

## 2025-08-21 ENCOUNTER — TELEPHONE (OUTPATIENT)
Dept: PHARMACY | Facility: CLINIC | Age: 35
End: 2025-08-21
Payer: COMMERCIAL

## 2025-08-26 ENCOUNTER — PATIENT MESSAGE (OUTPATIENT)
Dept: PHARMACY | Facility: CLINIC | Age: 35
End: 2025-08-26
Payer: COMMERCIAL

## (undated) DEVICE — GOWN POLY REINF BRTH SLV XL

## (undated) DEVICE — SUT VICRYL+ 27 UR-6 VIOL

## (undated) DEVICE — BLADE SURG CARBON STEEL SZ11

## (undated) DEVICE — ELECTRODE REM PLYHSV RETURN 9

## (undated) DEVICE — CLIP HEMO-LOK MLX LARGE LF

## (undated) DEVICE — APPLICATOR CHLORAPREP ORN 26ML

## (undated) DEVICE — TROCAR ENDO Z THREAD KII 5X100

## (undated) DEVICE — NDL SAFETY 21G X 1 1/2 ECLPSE

## (undated) DEVICE — PACK CUSTOM ENDO CHOLO SLI

## (undated) DEVICE — STRAP OR TABLE 5IN X 72IN

## (undated) DEVICE — GLOVE SENSICARE PI ALOE 6

## (undated) DEVICE — GLOVE SENSICARE PI ALOE 7

## (undated) DEVICE — DRAPE ARM DAVINCI XI

## (undated) DEVICE — GLOVE SENSICARE PI ALOE 7.5

## (undated) DEVICE — GOWN POLY REINF X-LONG XL

## (undated) DEVICE — GLOVE SENSICARE PI GRN 6

## (undated) DEVICE — CANNULA SEAL 12MM

## (undated) DEVICE — ADHESIVE DERMABOND ADVANCED

## (undated) DEVICE — LINER SUCTION 3000CC

## (undated) DEVICE — SEAL UNIVERSAL 5MM-8MM XI

## (undated) DEVICE — DRAPE COLUMN DAVINCI XI

## (undated) DEVICE — GLOVE SENSICARE PI GRN 7

## (undated) DEVICE — SYR 30CC LUER LOCK

## (undated) DEVICE — DRAPE LAPSCP ABDOMINAL 20X36

## (undated) DEVICE — TOWEL OR DISP STRL BLUE 4/PK

## (undated) DEVICE — SOL ELECTROLUBE ANTI-STIC

## (undated) DEVICE — CANNULA REDUCER 12-8MM

## (undated) DEVICE — SET TUBE PNEUMOCLEAR SE HI FLO

## (undated) DEVICE — OBTURATOR BLADELESS 8MM XI CLR

## (undated) DEVICE — SUT EASE CROSSBOW CLSR SYS

## (undated) DEVICE — SOL CLEARIFY VISUALIZATION LAP

## (undated) DEVICE — SLEEVE SCD EXPRESS KNEE MEDIUM

## (undated) DEVICE — BAG TISS RETRV MONARCH 10MM

## (undated) DEVICE — SUT MONOCRYL 4-0 PS-2